# Patient Record
Sex: MALE | Race: WHITE | Employment: FULL TIME | URBAN - METROPOLITAN AREA
[De-identification: names, ages, dates, MRNs, and addresses within clinical notes are randomized per-mention and may not be internally consistent; named-entity substitution may affect disease eponyms.]

---

## 2018-05-28 RX ORDER — METFORMIN HYDROCHLORIDE 500 MG/1
TABLET ORAL
Qty: 60 TABLET | Refills: 0 | Status: SHIPPED | OUTPATIENT
Start: 2018-05-28 | End: 2019-07-15 | Stop reason: SDUPTHER

## 2018-05-28 RX ORDER — LOSARTAN POTASSIUM 50 MG/1
TABLET ORAL
Qty: 30 TABLET | Refills: 0 | Status: SHIPPED | OUTPATIENT
Start: 2018-05-28 | End: 2018-07-19 | Stop reason: SDUPTHER

## 2018-07-19 RX ORDER — LOSARTAN POTASSIUM 50 MG/1
TABLET ORAL
Qty: 30 TABLET | Refills: 0 | Status: SHIPPED | OUTPATIENT
Start: 2018-07-19 | End: 2019-07-15 | Stop reason: SDUPTHER

## 2019-07-15 ENCOUNTER — HOSPITAL ENCOUNTER (EMERGENCY)
Facility: HOSPITAL | Age: 54
Discharge: HOME | End: 2019-07-15
Attending: EMERGENCY MEDICINE
Payer: COMMERCIAL

## 2019-07-15 VITALS
BODY MASS INDEX: 36.53 KG/M2 | TEMPERATURE: 99 F | DIASTOLIC BLOOD PRESSURE: 176 MMHG | WEIGHT: 300 LBS | RESPIRATION RATE: 17 BRPM | HEART RATE: 81 BPM | OXYGEN SATURATION: 96 % | SYSTOLIC BLOOD PRESSURE: 203 MMHG | HEIGHT: 76 IN

## 2019-07-15 DIAGNOSIS — I10 ESSENTIAL HYPERTENSION: ICD-10-CM

## 2019-07-15 DIAGNOSIS — H53.8 BLURRY VISION, RIGHT EYE: Primary | ICD-10-CM

## 2019-07-15 PROCEDURE — 99283 EMERGENCY DEPT VISIT LOW MDM: CPT

## 2019-07-15 RX ORDER — LOSARTAN POTASSIUM 50 MG/1
50 TABLET ORAL DAILY
Qty: 30 TABLET | Refills: 0 | Status: SHIPPED | OUTPATIENT
Start: 2019-07-15 | End: 2019-08-14

## 2019-07-15 RX ORDER — LOSARTAN POTASSIUM 50 MG/1
50 TABLET ORAL
Qty: 30 TABLET | Refills: 0 | Status: SHIPPED | OUTPATIENT
Start: 2019-07-15 | End: 2019-08-12 | Stop reason: SDUPTHER

## 2019-07-15 RX ORDER — TETRACAINE HYDROCHLORIDE 5 MG/ML
1 SOLUTION OPHTHALMIC ONCE
Status: COMPLETED | OUTPATIENT
Start: 2019-07-15 | End: 2019-07-15

## 2019-07-15 RX ORDER — METFORMIN HYDROCHLORIDE 500 MG/1
500 TABLET ORAL 2 TIMES DAILY WITH MEALS
Qty: 60 TABLET | Refills: 0 | Status: SHIPPED | OUTPATIENT
Start: 2019-07-15 | End: 2019-08-19 | Stop reason: SDUPTHER

## 2019-07-15 NOTE — ED INITIAL ASSESSMENT (HPI)
Right eye visual problems.  Reports he has been out of his losartan for the past few weeks and has elevated BP

## 2019-07-16 NOTE — ED NOTES
Pt c/o blurry vision to his rt eye since 1200 today. Pt notes that he ran out of his losartan prescription x2wks. Pt states that he is found an extra losartan pill today and took one dose this am. Pt denies headache. Kennard is negative.  Pt also mention

## 2019-07-16 NOTE — ED PROVIDER NOTES
Patient Seen in: [facility] Emergency Department    History   Patient presents with:   Eye Visual Problem (opthalmic)    Stated Complaint: right eye visual problems     HPI    58-year-old male presents for complaint of blurry vision to his right ey Eye Chart Acuity: 20/50, Corrected  Left Eye Chart Acuity: 20/25, Corrected    Physical Exam   Constitutional: He is oriented to person, place, and time. He appears well-developed and well-nourished. HENT:   Head: Normocephalic and atraumatic.    Eyes: Pu neurologist once home. He is otherwise neurovascularly intact and a symptom medic in regards to his elevated blood pressure. Imaging:   No results found.       Clinical impression as well as any lab results and radiology findings were discussed with the

## 2019-08-02 ENCOUNTER — OFFICE VISIT (OUTPATIENT)
Dept: PRIMARY CARE | Facility: CLINIC | Age: 54
End: 2019-08-02
Payer: COMMERCIAL

## 2019-08-02 ENCOUNTER — TELEPHONE (OUTPATIENT)
Dept: PRIMARY CARE | Facility: CLINIC | Age: 54
End: 2019-08-02

## 2019-08-02 VITALS
TEMPERATURE: 97.9 F | RESPIRATION RATE: 16 BRPM | DIASTOLIC BLOOD PRESSURE: 90 MMHG | SYSTOLIC BLOOD PRESSURE: 150 MMHG | BODY MASS INDEX: 38.36 KG/M2 | WEIGHT: 315 LBS | HEIGHT: 76 IN | HEART RATE: 66 BPM | OXYGEN SATURATION: 98 %

## 2019-08-02 DIAGNOSIS — Z11.59 NEED FOR HEPATITIS C SCREENING TEST: ICD-10-CM

## 2019-08-02 DIAGNOSIS — I10 ESSENTIAL HYPERTENSION: ICD-10-CM

## 2019-08-02 DIAGNOSIS — Z00.00 ENCOUNTER FOR GENERAL ADULT MEDICAL EXAMINATION WITHOUT ABNORMAL FINDINGS: Primary | ICD-10-CM

## 2019-08-02 DIAGNOSIS — Z12.5 SCREENING PSA (PROSTATE SPECIFIC ANTIGEN): ICD-10-CM

## 2019-08-02 DIAGNOSIS — E78.5 ELEVATED LIPIDS: ICD-10-CM

## 2019-08-02 DIAGNOSIS — Z12.11 SCREENING FOR COLON CANCER: ICD-10-CM

## 2019-08-02 DIAGNOSIS — E11.8 TYPE 2 DIABETES MELLITUS WITH COMPLICATION, WITHOUT LONG-TERM CURRENT USE OF INSULIN (CMS/HCC): ICD-10-CM

## 2019-08-02 PROCEDURE — 99396 PREV VISIT EST AGE 40-64: CPT | Performed by: FAMILY MEDICINE

## 2019-08-02 ASSESSMENT — ENCOUNTER SYMPTOMS
POLYPHAGIA: 0
NUMBNESS: 0
DYSURIA: 0
HEADACHES: 0
DIARRHEA: 0
POLYDIPSIA: 0
WHEEZING: 0
BLOOD IN STOOL: 0
DIZZINESS: 0
COUGH: 0
FEVER: 0
HEMATURIA: 0
CHILLS: 0
CONSTIPATION: 0
WEAKNESS: 0
VOMITING: 0
SHORTNESS OF BREATH: 0
NAUSEA: 0
ABDOMINAL PAIN: 0

## 2019-08-02 NOTE — PROGRESS NOTES
"Subjective      Patient ID: Lake Martin is a 53 y.o. male.  1965      epp        The following have been reviewed and updated as appropriate in this visit:  Problems       Review of Systems   Constitutional: Negative for chills and fever.   HENT: Negative for dental problem and hearing loss.    Eyes: Positive for visual disturbance.   Respiratory: Negative for cough, shortness of breath and wheezing.    Cardiovascular: Negative for chest pain and leg swelling.   Gastrointestinal: Negative for abdominal pain, blood in stool, constipation, diarrhea, nausea and vomiting.   Endocrine: Negative for cold intolerance, heat intolerance, polydipsia, polyphagia and polyuria.   Genitourinary: Negative for discharge, dysuria and hematuria.   Musculoskeletal: Negative for gait problem.   Skin: Negative for rash.   Neurological: Negative for dizziness, weakness, numbness and headaches.   All other systems reviewed and are negative.      Objective     Vitals:    08/02/19 0912 08/02/19 0938   BP: (!) 182/110 (!) 150/90   BP Location: Right upper arm    Patient Position: Sitting    Pulse: 66    Resp: 16    Temp: 36.6 °C (97.9 °F)    TempSrc: Oral    SpO2: 98%    Weight: (!) 143 kg (316 lb)    Height: 1.93 m (6' 4\")      Body mass index is 38.46 kg/m².    Physical Exam   Constitutional: He is oriented to person, place, and time. He appears well-developed and well-nourished. No distress.   HENT:   Head: Normocephalic and atraumatic.   Right Ear: Tympanic membrane, external ear and ear canal normal.   Left Ear: Tympanic membrane, external ear and ear canal normal.   Nose: Nose normal.   Mouth/Throat: Oropharynx is clear and moist.   Eyes: Pupils are equal, round, and reactive to light. Conjunctivae and EOM are normal. No scleral icterus.   Neck: Normal range of motion. Neck supple. No JVD present. No tracheal deviation present. No thyromegaly present.   Cardiovascular: Normal rate, regular rhythm and normal heart sounds.  Exam " reveals no gallop and no friction rub.    No murmur heard.  Pulmonary/Chest: Effort normal and breath sounds normal.   Abdominal: Soft. Bowel sounds are normal. He exhibits no distension and no mass. There is no hepatosplenomegaly. There is no tenderness. There is no rebound and no guarding. No hernia.   Musculoskeletal: Normal range of motion. He exhibits no edema or deformity.   Lymphadenopathy:     He has no cervical adenopathy.   Neurological: He is alert and oriented to person, place, and time. He has normal strength.   gnf   Skin: Skin is warm and dry. No rash noted.   Psychiatric: He has a normal mood and affect. His behavior is normal.   Vitals reviewed.      Assessment/Plan   Diagnoses and all orders for this visit:    Encounter for general adult medical examination without abnormal findings (Primary)  Comments:  rec shingrix    Screening for colon cancer  -     Direct Access Colonoscopy [facility]; Future    Elevated lipids  -     Lipid panel; Future    Type 2 diabetes mellitus with complication, without long-term current use of insulin (CMS/HCC)  Assessment & Plan:  Diab Diet  Regular exercise  Weight loss  Sees Ophthal     Orders:  -     Comprehensive metabolic panel; Future  -     Hemoglobin A1c; Future  -     Microalbumin / creatinine urine ratio; Future  -     CBC; Future    Screening PSA (prostate specific antigen)  -     PSA; Future    Need for hepatitis C screening test  -     Hepatitis C antibody; Future    Essential hypertension  Assessment & Plan:  Home readings 130/90  Monitor  Wt loss

## 2019-08-02 NOTE — TELEPHONE ENCOUNTER
Pt was seen [facility] about 2 weeks ago and requested the records be faxed to Dr. Fishman  Pt would like confirmation that these records have been received.   Pt can be reached at [phone].

## 2019-08-19 RX ORDER — METFORMIN HYDROCHLORIDE 500 MG/1
500 TABLET ORAL 2 TIMES DAILY WITH MEALS
Qty: 180 TABLET | Refills: 3 | Status: SHIPPED | OUTPATIENT
Start: 2019-08-19 | End: 2020-01-02 | Stop reason: SDUPTHER

## 2019-08-19 RX ORDER — LOSARTAN POTASSIUM 50 MG/1
50 TABLET ORAL
Qty: 90 TABLET | Refills: 3 | Status: SHIPPED | OUTPATIENT
Start: 2019-08-19 | End: 2020-10-30

## 2019-08-19 RX ORDER — METFORMIN HYDROCHLORIDE 500 MG/1
TABLET ORAL
Qty: 60 TABLET | Refills: 3 | Status: SHIPPED | OUTPATIENT
Start: 2019-08-19 | End: 2019-08-19 | Stop reason: SDUPTHER

## 2019-12-30 ENCOUNTER — TELEPHONE (OUTPATIENT)
Dept: PRIMARY CARE | Facility: CLINIC | Age: 54
End: 2019-12-30

## 2019-12-31 LAB
ERYTHROCYTE [DISTWIDTH] IN BLOOD BY AUTOMATED COUNT: 13.5 % (ref 12.3–15.4)
HCT VFR BLD AUTO: 46.5 % (ref 37.5–51)
HCV AB S/CO SERPL IA: 0.1 S/CO RATIO (ref 0–0.9)
HGB BLD-MCNC: 15.8 G/DL (ref 13–17.7)
MCH RBC QN AUTO: 31 PG (ref 26.6–33)
MCHC RBC AUTO-ENTMCNC: 34 G/DL (ref 31.5–35.7)
MCV RBC AUTO: 91 FL (ref 79–97)
PLATELET # BLD AUTO: 256 X10E3/UL (ref 150–450)
RBC # BLD AUTO: 5.09 X10E6/UL (ref 4.14–5.8)
WBC # BLD AUTO: 9 X10E3/UL (ref 3.4–10.8)

## 2020-01-01 LAB
ALBUMIN SERPL-MCNC: 4.3 G/DL (ref 3.5–5.5)
ALBUMIN/CREAT UR: 59.9 MG/G CREAT (ref 0–30)
ALBUMIN/GLOB SERPL: 1.6 {RATIO} (ref 1.2–2.2)
ALP SERPL-CCNC: 92 IU/L (ref 39–117)
ALT SERPL-CCNC: 18 IU/L (ref 0–44)
AST SERPL-CCNC: 16 IU/L (ref 0–40)
BILIRUB SERPL-MCNC: 0.3 MG/DL (ref 0–1.2)
BUN SERPL-MCNC: 10 MG/DL (ref 6–24)
BUN/CREAT SERPL: 14 (ref 9–20)
CALCIUM SERPL-MCNC: 9.5 MG/DL (ref 8.7–10.2)
CHLORIDE SERPL-SCNC: 98 MMOL/L (ref 96–106)
CHOLEST SERPL-MCNC: 211 MG/DL (ref 100–199)
CO2 SERPL-SCNC: 25 MMOL/L (ref 20–29)
CREAT SERPL-MCNC: 0.73 MG/DL (ref 0.76–1.27)
CREAT UR-MCNC: 108.1 MG/DL
GLOBULIN SER CALC-MCNC: 2.7 G/DL (ref 1.5–4.5)
GLUCOSE SERPL-MCNC: 268 MG/DL (ref 65–99)
HBA1C MFR BLD: 9.4 % (ref 4.8–5.6)
HDLC SERPL-MCNC: 41 MG/DL
LAB CORP EGFR IF AFRICN AM: 121 ML/MIN/1.73
LAB CORP EGFR IF NONAFRICN AM: 104 ML/MIN/1.73
LDLC SERPL CALC-MCNC: 104 MG/DL (ref 0–99)
MICROALBUMIN UR-MCNC: 64.7 UG/ML
POTASSIUM SERPL-SCNC: 4.2 MMOL/L (ref 3.5–5.2)
PROT SERPL-MCNC: 7 G/DL (ref 6–8.5)
PSA SERPL-MCNC: 0.1 NG/ML (ref 0–4)
SODIUM SERPL-SCNC: 138 MMOL/L (ref 134–144)
TRIGL SERPL-MCNC: 329 MG/DL (ref 0–149)
VLDLC SERPL CALC-MCNC: 66 MG/DL (ref 5–40)

## 2020-01-02 ENCOUNTER — TELEPHONE (OUTPATIENT)
Dept: PRIMARY CARE | Facility: CLINIC | Age: 55
End: 2020-01-02

## 2020-01-02 RX ORDER — METFORMIN HYDROCHLORIDE 1000 MG/1
1000 TABLET ORAL 2 TIMES DAILY WITH MEALS
Qty: 180 TABLET | Refills: 1 | Status: SHIPPED | OUTPATIENT
Start: 2020-01-02 | End: 2020-10-30

## 2020-01-02 NOTE — TELEPHONE ENCOUNTER
Left detailed vm for pt with all information. Sending new med dose to pharmacy ----- Message from Leland Fishman MD sent at 1/2/2020  3:42 PM EST -----  a1c  too high  Watch diet; exercise  incr metformin to 1000 mg BID  F/u appt 3 months

## 2020-03-11 ENCOUNTER — TELEPHONE (OUTPATIENT)
Dept: PRIMARY CARE | Facility: CLINIC | Age: 55
End: 2020-03-11

## 2020-06-15 ENCOUNTER — TELEPHONE (OUTPATIENT)
Dept: PRIMARY CARE | Facility: CLINIC | Age: 55
End: 2020-06-15

## 2020-06-15 NOTE — TELEPHONE ENCOUNTER
Pt called and was wondering where he should go to get his shingles vaccination. Should he come to the office or does he have to go somewhere else. Please advise. Pt can be reached at [phone]

## 2020-06-22 ENCOUNTER — CLINICAL SUPPORT (OUTPATIENT)
Dept: PRIMARY CARE | Facility: CLINIC | Age: 55
End: 2020-06-22
Payer: COMMERCIAL

## 2020-06-22 DIAGNOSIS — Z23 IMMUNIZATION DUE: Primary | ICD-10-CM

## 2020-06-22 PROCEDURE — 90750 HZV VACC RECOMBINANT IM: CPT | Performed by: FAMILY MEDICINE

## 2020-06-22 PROCEDURE — 90471 IMMUNIZATION ADMIN: CPT | Performed by: FAMILY MEDICINE

## 2020-07-22 RX ORDER — METFORMIN HYDROCHLORIDE 1000 MG/1
TABLET ORAL
Qty: 180 TABLET | Refills: 1 | OUTPATIENT
Start: 2020-07-22

## 2020-07-22 NOTE — TELEPHONE ENCOUNTER
epv 8/2/19  Last A1c 12/2019  9.4    Pt states he has about 1.5 months of medication remaining, did not request refill  Reviewed pcp policy for DM f/u appts  States has lost weight & has a nurse friend that tests his A1c    Advised to contact the office at earliest convenience to schedule an appt as will be needed for refills

## 2020-08-28 ENCOUNTER — TELEPHONE (OUTPATIENT)
Dept: PRIMARY CARE | Facility: CLINIC | Age: 55
End: 2020-08-28

## 2020-10-28 ENCOUNTER — TELEPHONE (OUTPATIENT)
Dept: PRIMARY CARE | Facility: CLINIC | Age: 55
End: 2020-10-28

## 2020-10-30 RX ORDER — METFORMIN HYDROCHLORIDE 1000 MG/1
TABLET ORAL
Qty: 60 TABLET | Refills: 0 | Status: SHIPPED | OUTPATIENT
Start: 2020-10-30 | End: 2020-11-30

## 2020-10-30 RX ORDER — LOSARTAN POTASSIUM 50 MG/1
TABLET ORAL
Qty: 30 TABLET | Refills: 0 | Status: SHIPPED | OUTPATIENT
Start: 2020-10-30 | End: 2020-11-30

## 2020-11-16 ENCOUNTER — CLINICAL SUPPORT (OUTPATIENT)
Dept: PRIMARY CARE | Facility: CLINIC | Age: 55
End: 2020-11-16
Payer: COMMERCIAL

## 2020-11-16 PROCEDURE — 90471 IMMUNIZATION ADMIN: CPT | Performed by: FAMILY MEDICINE

## 2020-11-16 PROCEDURE — 90750 HZV VACC RECOMBINANT IM: CPT | Performed by: FAMILY MEDICINE

## 2020-12-28 RX ORDER — METFORMIN HYDROCHLORIDE 1000 MG/1
TABLET ORAL
Qty: 60 TABLET | Refills: 0 | Status: SHIPPED | OUTPATIENT
Start: 2020-12-28 | End: 2020-12-30 | Stop reason: SDUPTHER

## 2020-12-29 RX ORDER — METFORMIN HYDROCHLORIDE 1000 MG/1
1000 TABLET ORAL 2 TIMES DAILY WITH MEALS
Qty: 180 TABLET | Refills: 0 | Status: CANCELLED | OUTPATIENT
Start: 2020-12-29

## 2020-12-30 ENCOUNTER — TELEPHONE (OUTPATIENT)
Dept: PRIMARY CARE | Facility: CLINIC | Age: 55
End: 2020-12-30

## 2020-12-30 ENCOUNTER — OFFICE VISIT (OUTPATIENT)
Dept: PRIMARY CARE | Facility: CLINIC | Age: 55
End: 2020-12-30
Payer: COMMERCIAL

## 2020-12-30 VITALS
HEART RATE: 66 BPM | WEIGHT: 304 LBS | HEIGHT: 76 IN | SYSTOLIC BLOOD PRESSURE: 182 MMHG | DIASTOLIC BLOOD PRESSURE: 100 MMHG | OXYGEN SATURATION: 99 % | BODY MASS INDEX: 37.02 KG/M2

## 2020-12-30 DIAGNOSIS — Z00.00 ENCOUNTER FOR GENERAL ADULT MEDICAL EXAMINATION WITHOUT ABNORMAL FINDINGS: Primary | ICD-10-CM

## 2020-12-30 DIAGNOSIS — Z12.5 SCREENING PSA (PROSTATE SPECIFIC ANTIGEN): ICD-10-CM

## 2020-12-30 DIAGNOSIS — E78.5 ELEVATED LIPIDS: ICD-10-CM

## 2020-12-30 DIAGNOSIS — E11.69 TYPE 2 DIABETES MELLITUS WITH OTHER SPECIFIED COMPLICATION, WITHOUT LONG-TERM CURRENT USE OF INSULIN: ICD-10-CM

## 2020-12-30 DIAGNOSIS — I10 ESSENTIAL HYPERTENSION: ICD-10-CM

## 2020-12-30 PROCEDURE — 99396 PREV VISIT EST AGE 40-64: CPT | Performed by: FAMILY MEDICINE

## 2020-12-30 RX ORDER — LOSARTAN POTASSIUM AND HYDROCHLOROTHIAZIDE 12.5; 5 MG/1; MG/1
1 TABLET ORAL DAILY
Qty: 90 TABLET | Refills: 0 | Status: SHIPPED | OUTPATIENT
Start: 2020-12-30 | End: 2021-04-02 | Stop reason: SDUPTHER

## 2020-12-30 RX ORDER — METFORMIN HYDROCHLORIDE 1000 MG/1
1000 TABLET ORAL 2 TIMES DAILY WITH MEALS
Qty: 180 TABLET | Refills: 1 | OUTPATIENT
Start: 2020-12-30 | End: 2021-03-30

## 2020-12-30 RX ORDER — METFORMIN HYDROCHLORIDE 1000 MG/1
1000 TABLET ORAL 2 TIMES DAILY WITH MEALS
Qty: 180 TABLET | Refills: 0 | Status: SHIPPED | OUTPATIENT
Start: 2020-12-30 | End: 2021-05-10

## 2020-12-30 RX ORDER — LOSARTAN POTASSIUM 50 MG/1
50 TABLET ORAL
Qty: 90 TABLET | Refills: 0 | OUTPATIENT
Start: 2020-12-30

## 2020-12-30 ASSESSMENT — ENCOUNTER SYMPTOMS
NUMBNESS: 0
WEAKNESS: 0
BLOOD IN STOOL: 0
WHEEZING: 0
CONSTIPATION: 0
POLYPHAGIA: 0
POLYDIPSIA: 0
HEADACHES: 0
HEMATURIA: 0
CHILLS: 0
NAUSEA: 0
DYSURIA: 0
FEVER: 0
DIARRHEA: 0
VOMITING: 0
SHORTNESS OF BREATH: 0
COUGH: 0
DIZZINESS: 0
DYSPHORIC MOOD: 0
ABDOMINAL PAIN: 0

## 2020-12-30 NOTE — PROGRESS NOTES
"      Subjective      Patient ID: Lake Martin is a 55 y.o. male.  1965      epp      The following have been reviewed and updated as appropriate in this visit:  Problems       Review of Systems   Constitutional: Negative for chills and fever.   HENT: Negative for dental problem and hearing loss.    Eyes: Negative for visual disturbance.   Respiratory: Negative for cough, shortness of breath and wheezing.    Cardiovascular: Negative for chest pain and leg swelling.   Gastrointestinal: Negative for abdominal pain, blood in stool, constipation, diarrhea, nausea and vomiting.   Endocrine: Negative for cold intolerance, heat intolerance, polydipsia, polyphagia and polyuria.   Genitourinary: Negative for discharge, dysuria and hematuria.   Musculoskeletal: Negative for gait problem.   Skin: Negative for rash.   Neurological: Negative for dizziness, syncope, weakness, numbness and headaches.   Psychiatric/Behavioral: Negative for dysphoric mood.   All other systems reviewed and are negative.      Objective     Vitals:    12/30/20 1116   BP: (!) 182/100   BP Location: Right upper arm   Patient Position: Sitting   Pulse: 66   SpO2: 99%   Weight: (!) 138 kg (304 lb)   Height: 1.93 m (6' 4\")     Body mass index is 37 kg/m².    Physical Exam  Vitals signs and nursing note reviewed.   Constitutional:       Appearance: Normal appearance. He is well-developed.   HENT:      Head: Normocephalic and atraumatic.      Right Ear: Tympanic membrane, ear canal and external ear normal.      Left Ear: Tympanic membrane, ear canal and external ear normal.      Nose: Nose normal.      Mouth/Throat:      Mouth: Mucous membranes are moist.      Pharynx: Oropharynx is clear.   Eyes:      General: No scleral icterus.     Conjunctiva/sclera: Conjunctivae normal.      Pupils: Pupils are equal, round, and reactive to light.   Neck:      Musculoskeletal: Normal range of motion and neck supple.      Thyroid: No thyromegaly.      Vascular: No " JVD.      Trachea: No tracheal deviation.   Cardiovascular:      Rate and Rhythm: Normal rate and regular rhythm.      Heart sounds: Normal heart sounds. No murmur. No friction rub. No gallop.    Pulmonary:      Effort: Pulmonary effort is normal.      Breath sounds: Normal breath sounds.   Abdominal:      General: Bowel sounds are normal. There is no distension.      Palpations: Abdomen is soft. There is no mass.      Tenderness: There is no abdominal tenderness. There is no guarding or rebound.      Hernia: No hernia is present.   Musculoskeletal: Normal range of motion.         General: No deformity.      Right lower leg: No edema.      Left lower leg: No edema.   Lymphadenopathy:      Cervical: No cervical adenopathy.   Skin:     General: Skin is warm and dry.      Findings: No rash.   Neurological:      General: No focal deficit present.      Mental Status: He is alert and oriented to person, place, and time.   Psychiatric:         Mood and Affect: Mood normal.         Behavior: Behavior normal.         Assessment/Plan   Diagnoses and all orders for this visit:    Encounter for general adult medical examination without abnormal findings (Primary)  Comments:  labs  colonosc    Screening PSA (prostate specific antigen)  -     PSA; Future    Type 2 diabetes mellitus with other specified complication, without long-term current use of insulin (CMS/[facility])  -     CBC and differential; Future  -     Comprehensive metabolic panel; Future  -     Hemoglobin A1c; Future  -     Lipid panel; Future  -     Microalbumin / creatinine urine ratio; Future    Elevated lipids  -     CBC and differential; Future  -     Comprehensive metabolic panel; Future  -     Lipid panel; Future    Essential hypertension  Comments:  change to hyzaar  f/u 1 month    Other orders  -     losartan-hydrochlorothiazide (HYZAAR) 50-12.5 mg per tablet; Take 1 tablet by mouth daily.

## 2020-12-30 NOTE — TELEPHONE ENCOUNTER
Dr. Laura Ferrell can you please add an referral and order for Colpnoscopy for [place]. Thank you.

## 2021-03-01 ENCOUNTER — TELEPHONE (OUTPATIENT)
Dept: PRIMARY CARE | Facility: CLINIC | Age: 56
End: 2021-03-01

## 2021-03-01 NOTE — TELEPHONE ENCOUNTER
On Thursday patient noticed a pea sized bump on his L cheek area, the size has since increased, today it is about the size of a nickel. It is not red and he has no discomfort. He learned that there is a gland there and believes that's what it is. He has recently been having issues with fluid in ears and ears feeling clogged but pop eventually and wants to know if maybe its related to that. He wants to know if he should be seen or do a virtual appt. He is not available until Friday if so, and would like recommendation on anything to do until then. Has been putting warm compresses on it.    [phone]

## 2021-03-05 ENCOUNTER — OFFICE VISIT (OUTPATIENT)
Dept: PRIMARY CARE | Facility: CLINIC | Age: 56
End: 2021-03-05
Payer: COMMERCIAL

## 2021-03-05 ENCOUNTER — TELEPHONE (OUTPATIENT)
Dept: PRIMARY CARE | Facility: CLINIC | Age: 56
End: 2021-03-05

## 2021-03-05 VITALS
HEIGHT: 76 IN | DIASTOLIC BLOOD PRESSURE: 70 MMHG | HEART RATE: 70 BPM | OXYGEN SATURATION: 97 % | BODY MASS INDEX: 37.99 KG/M2 | RESPIRATION RATE: 16 BRPM | TEMPERATURE: 97.7 F | SYSTOLIC BLOOD PRESSURE: 124 MMHG | WEIGHT: 312 LBS

## 2021-03-05 DIAGNOSIS — K11.8 PAROTID MASS: Primary | ICD-10-CM

## 2021-03-05 DIAGNOSIS — Z12.11 COLON CANCER SCREENING: Primary | ICD-10-CM

## 2021-03-05 DIAGNOSIS — E11.69 TYPE 2 DIABETES MELLITUS WITH OTHER SPECIFIED COMPLICATION, WITHOUT LONG-TERM CURRENT USE OF INSULIN: ICD-10-CM

## 2021-03-05 PROCEDURE — 99213 OFFICE O/P EST LOW 20 MIN: CPT | Performed by: FAMILY MEDICINE

## 2021-03-05 RX ORDER — AZITHROMYCIN 250 MG/1
TABLET, FILM COATED ORAL
Qty: 6 TABLET | Refills: 0 | Status: SHIPPED | OUTPATIENT
Start: 2021-03-05 | End: 2021-03-10

## 2021-03-05 ASSESSMENT — ENCOUNTER SYMPTOMS
RHINORRHEA: 0
UNEXPECTED WEIGHT CHANGE: 0
FACIAL SWELLING: 1
APPETITE CHANGE: 0
STRIDOR: 0
FEVER: 0
CHILLS: 0
ABDOMINAL PAIN: 0
COUGH: 0
SINUS PAIN: 0
SORE THROAT: 0
WHEEZING: 0
SHORTNESS OF BREATH: 0
VOMITING: 0
COLOR CHANGE: 0
DIAPHORESIS: 0
FATIGUE: 0
EYE DISCHARGE: 0
HEADACHES: 0
DIARRHEA: 0

## 2021-03-05 ASSESSMENT — PATIENT HEALTH QUESTIONNAIRE - PHQ9: SUM OF ALL RESPONSES TO PHQ9 QUESTIONS 1 & 2: 0

## 2021-03-05 NOTE — TELEPHONE ENCOUNTER
Pt needs written Direct Access referral to [facility] for his colonoscopy.   Pt was given their number to call to schedule but a referral needs to be put in the system

## 2021-03-05 NOTE — PROGRESS NOTES
"      Subjective      Patient ID: Lake Martin is a 55 y.o. male.  1965      L parotid lump x 1 wk  Smaller now  Not painful  Was mildly tender      The following have been reviewed and updated as appropriate in this visit:       Review of Systems   Constitutional: Negative for appetite change, chills, diaphoresis, fatigue, fever and unexpected weight change.   HENT: Positive for facial swelling. Negative for congestion, dental problem, ear discharge, ear pain, hearing loss, mouth sores, postnasal drip, rhinorrhea, sinus pain, sore throat and tinnitus.    Eyes: Negative for discharge.   Respiratory: Negative for cough, shortness of breath, wheezing and stridor.    Cardiovascular: Negative for chest pain and leg swelling.   Gastrointestinal: Negative for abdominal pain, diarrhea and vomiting.   Skin: Negative for color change and rash.   Neurological: Negative for headaches.       Objective     Vitals:    03/05/21 1157   BP: 124/70   BP Location: Left upper arm   Patient Position: Sitting   Pulse: 70   Resp: 16   Temp: 36.5 °C (97.7 °F)   TempSrc: Tympanic   SpO2: 97%   Weight: (!) 142 kg (312 lb)   Height: 1.93 m (6' 4\")     Body mass index is 37.98 kg/m².    Physical Exam  Vitals signs and nursing note reviewed.   Constitutional:       Appearance: Normal appearance.   HENT:      Head: Normocephalic and atraumatic.      Left Ear: Tympanic membrane, ear canal and external ear normal.      Ears:      Comments: Firm nontender nonpulsatile mobile L parotid mass     Nose: Nose normal.      Mouth/Throat:      Mouth: Mucous membranes are moist.      Pharynx: Oropharynx is clear.   Eyes:      General: No scleral icterus.  Neck:      Musculoskeletal: Neck supple.   Musculoskeletal:      Right lower leg: No edema.      Left lower leg: No edema.   Lymphadenopathy:      Cervical: No cervical adenopathy.   Skin:     General: Skin is warm and dry.      Findings: No erythema or rash.   Neurological:      General: No focal " deficit present.      Mental Status: He is alert.   Psychiatric:         Mood and Affect: Mood normal.         Behavior: Behavior normal.         Assessment/Plan   Diagnoses and all orders for this visit:    Parotid mass (Primary)  Comments:  improved  ?sialoadenitis  warm compresses  zpak  to ENT if persists    Type 2 diabetes mellitus with other specified complication, without long-term current use of insulin (CMS/[facility])    Other orders  -     azithromycin (ZITHROMAX) 250 mg tablet; Take 2 tablets the first day, then 1 tablet daily for 4 days.

## 2021-03-06 LAB
ALBUMIN SERPL-MCNC: 4.3 G/DL (ref 3.8–4.9)
ALBUMIN/CREAT UR: 59 MG/G CREAT (ref 0–29)
ALBUMIN/GLOB SERPL: 1.5 {RATIO} (ref 1.2–2.2)
ALP SERPL-CCNC: 72 IU/L (ref 39–117)
ALT SERPL-CCNC: 14 IU/L (ref 0–44)
AST SERPL-CCNC: 19 IU/L (ref 0–40)
BASOPHILS # BLD AUTO: 0.1 X10E3/UL (ref 0–0.2)
BASOPHILS NFR BLD AUTO: 1 %
BILIRUB SERPL-MCNC: 1 MG/DL (ref 0–1.2)
BUN SERPL-MCNC: 13 MG/DL (ref 6–24)
BUN/CREAT SERPL: 17 (ref 9–20)
CALCIUM SERPL-MCNC: 10 MG/DL (ref 8.7–10.2)
CHLORIDE SERPL-SCNC: 102 MMOL/L (ref 96–106)
CHOLEST SERPL-MCNC: 185 MG/DL (ref 100–199)
CO2 SERPL-SCNC: 27 MMOL/L (ref 20–29)
CREAT SERPL-MCNC: 0.77 MG/DL (ref 0.76–1.27)
CREAT UR-MCNC: 141.7 MG/DL
EOSINOPHIL # BLD AUTO: 0.2 X10E3/UL (ref 0–0.4)
EOSINOPHIL NFR BLD AUTO: 2 %
ERYTHROCYTE [DISTWIDTH] IN BLOOD BY AUTOMATED COUNT: 13 % (ref 11.6–15.4)
GLOBULIN SER CALC-MCNC: 2.9 G/DL (ref 1.5–4.5)
GLUCOSE SERPL-MCNC: 133 MG/DL (ref 65–99)
HBA1C MFR BLD: 6.7 % (ref 4.8–5.6)
HCT VFR BLD AUTO: 45.3 % (ref 37.5–51)
HDLC SERPL-MCNC: 48 MG/DL
HGB BLD-MCNC: 15.2 G/DL (ref 13–17.7)
IMM GRANULOCYTES # BLD AUTO: 0 X10E3/UL (ref 0–0.1)
IMM GRANULOCYTES NFR BLD AUTO: 0 %
LAB CORP EGFR IF AFRICN AM: 117 ML/MIN/1.73
LAB CORP EGFR IF NONAFRICN AM: 101 ML/MIN/1.73
LDLC SERPL CALC-MCNC: 109 MG/DL (ref 0–99)
LYMPHOCYTES # BLD AUTO: 1.7 X10E3/UL (ref 0.7–3.1)
LYMPHOCYTES NFR BLD AUTO: 23 %
MCH RBC QN AUTO: 31.2 PG (ref 26.6–33)
MCHC RBC AUTO-ENTMCNC: 33.6 G/DL (ref 31.5–35.7)
MCV RBC AUTO: 93 FL (ref 79–97)
MICROALBUMIN UR-MCNC: 83 UG/ML
MONOCYTES # BLD AUTO: 0.4 X10E3/UL (ref 0.1–0.9)
MONOCYTES NFR BLD AUTO: 6 %
NEUTROPHILS # BLD AUTO: 5.2 X10E3/UL (ref 1.4–7)
NEUTROPHILS NFR BLD AUTO: 68 %
PLATELET # BLD AUTO: 210 X10E3/UL (ref 150–450)
POTASSIUM SERPL-SCNC: 3.9 MMOL/L (ref 3.5–5.2)
PROT SERPL-MCNC: 7.2 G/DL (ref 6–8.5)
PSA SERPL-MCNC: 0.1 NG/ML (ref 0–4)
RBC # BLD AUTO: 4.87 X10E6/UL (ref 4.14–5.8)
SODIUM SERPL-SCNC: 140 MMOL/L (ref 134–144)
TRIGL SERPL-MCNC: 161 MG/DL (ref 0–149)
VLDLC SERPL CALC-MCNC: 28 MG/DL (ref 5–40)
WBC # BLD AUTO: 7.5 X10E3/UL (ref 3.4–10.8)

## 2021-04-02 RX ORDER — LOSARTAN POTASSIUM AND HYDROCHLOROTHIAZIDE 12.5; 5 MG/1; MG/1
1 TABLET ORAL DAILY
Qty: 90 TABLET | Refills: 1 | Status: SHIPPED | OUTPATIENT
Start: 2021-04-02 | End: 2021-08-27

## 2021-05-10 RX ORDER — METFORMIN HYDROCHLORIDE 1000 MG/1
TABLET ORAL
Qty: 180 TABLET | Refills: 0 | Status: SHIPPED | OUTPATIENT
Start: 2021-05-10 | End: 2021-08-23

## 2021-05-10 NOTE — TELEPHONE ENCOUNTER
Medicine Refill Request    Last Office Visit: 3/5/2021  Last Telemedicine Visit: Visit date not found    Next Office Visit: Visit date not found  Next Telemedicine Visit: Visit date not found         Current Outpatient Medications:   •  losartan-hydrochlorothiazide (HYZAAR) 50-12.5 mg per tablet, Take 1 tablet by mouth daily., Disp: 90 tablet, Rfl: 1  •  metFORMIN (GLUCOPHAGE) 1,000 mg tablet, Take 1 tablet (1,000 mg total) by mouth 2 (two) times a day with meals., Disp: 180 tablet, Rfl: 0      BP Readings from Last 3 Encounters:   03/05/21 124/70   12/30/20 (!) 182/100   08/02/19 (!) 150/90       Recent Lab results:  Lab Results   Component Value Date    CHOL 185 03/05/2021   ,   Lab Results   Component Value Date    HDL 48 03/05/2021   ,   Lab Results   Component Value Date    LDLCALC 109 (H) 03/05/2021   ,   Lab Results   Component Value Date    TRIG 161 (H) 03/05/2021        Lab Results   Component Value Date    GLUCOSE 133 (H) 03/05/2021   ,   Lab Results   Component Value Date    HGBA1C 6.7 (H) 03/05/2021         Lab Results   Component Value Date    CREATININE 0.77 03/05/2021       Lab Results   Component Value Date    TSH 3.52 12/15/2015

## 2021-08-23 RX ORDER — METFORMIN HYDROCHLORIDE 1000 MG/1
TABLET ORAL
Qty: 180 TABLET | Refills: 0 | Status: SHIPPED | OUTPATIENT
Start: 2021-08-23 | End: 2021-11-24

## 2021-08-23 NOTE — TELEPHONE ENCOUNTER
Medicine Refill Request    Last Office Visit: 3/5/2021  Last Telemedicine Visit: Visit date not found    Next Office Visit: Visit date not found  Next Telemedicine Visit: Visit date not found         Current Outpatient Medications:   •  metFORMIN (GLUCOPHAGE) 1,000 mg tablet, TAKE 1 TABLET BY MOUTH TWICE A DAY WITH MEALS, Disp: 180 tablet, Rfl: 0  •  losartan-hydrochlorothiazide (HYZAAR) 50-12.5 mg per tablet, Take 1 tablet by mouth daily., Disp: 90 tablet, Rfl: 1      BP Readings from Last 3 Encounters:   03/05/21 124/70   12/30/20 (!) 182/100   08/02/19 (!) 150/90       Recent Lab results:  Lab Results   Component Value Date    CHOL 185 03/05/2021   ,   Lab Results   Component Value Date    HDL 48 03/05/2021   ,   Lab Results   Component Value Date    LDLCALC 109 (H) 03/05/2021   ,   Lab Results   Component Value Date    TRIG 161 (H) 03/05/2021        Lab Results   Component Value Date    GLUCOSE 133 (H) 03/05/2021   ,   Lab Results   Component Value Date    HGBA1C 6.7 (H) 03/05/2021         Lab Results   Component Value Date    CREATININE 0.77 03/05/2021       Lab Results   Component Value Date    TSH 3.52 12/15/2015

## 2021-08-27 RX ORDER — LOSARTAN POTASSIUM AND HYDROCHLOROTHIAZIDE 12.5; 5 MG/1; MG/1
TABLET ORAL
Qty: 90 TABLET | Refills: 1 | Status: SHIPPED | OUTPATIENT
Start: 2021-08-27 | End: 2022-02-11

## 2021-12-06 ENCOUNTER — TRANSCRIBE ORDERS (OUTPATIENT)
Dept: SCHEDULING | Age: 56
End: 2021-12-06
Payer: COMMERCIAL

## 2021-12-06 DIAGNOSIS — R19.8 OTHER SPECIFIED SYMPTOMS AND SIGNS INVOLVING THE DIGESTIVE SYSTEM AND ABDOMEN: Primary | ICD-10-CM

## 2021-12-07 ENCOUNTER — APPOINTMENT (OUTPATIENT)
Dept: LAB | Facility: HOSPITAL | Age: 56
End: 2021-12-07
Attending: INTERNAL MEDICINE
Payer: COMMERCIAL

## 2021-12-07 ENCOUNTER — TRANSCRIBE ORDERS (OUTPATIENT)
Dept: SCHEDULING | Age: 56
End: 2021-12-07
Payer: COMMERCIAL

## 2021-12-07 DIAGNOSIS — R19.8 OTHER SPECIFIED SYMPTOMS AND SIGNS INVOLVING THE DIGESTIVE SYSTEM AND ABDOMEN: ICD-10-CM

## 2021-12-07 DIAGNOSIS — R19.8 OTHER SPECIFIED SYMPTOMS AND SIGNS INVOLVING THE DIGESTIVE SYSTEM AND ABDOMEN: Primary | ICD-10-CM

## 2021-12-07 LAB
ALBUMIN SERPL-MCNC: 3.7 G/DL (ref 3.4–5)
ALP SERPL-CCNC: 64 IU/L (ref 35–126)
ALT SERPL-CCNC: 20 IU/L (ref 16–63)
ANION GAP SERPL CALC-SCNC: 11 MEQ/L (ref 3–15)
AST SERPL-CCNC: 19 IU/L (ref 15–41)
BILIRUB SERPL-MCNC: 1 MG/DL (ref 0.3–1.2)
BUN SERPL-MCNC: 5 MG/DL (ref 8–20)
CALCIUM SERPL-MCNC: 9.8 MG/DL (ref 8.9–10.3)
CEA SERPL-MCNC: 1.6 NG/ML
CHLORIDE SERPL-SCNC: 101 MEQ/L (ref 98–109)
CO2 SERPL-SCNC: 26 MEQ/L (ref 22–32)
CREAT SERPL-MCNC: 0.8 MG/DL (ref 0.8–1.3)
ERYTHROCYTE [DISTWIDTH] IN BLOOD BY AUTOMATED COUNT: 12.3 % (ref 11.6–14.4)
GFR SERPL CREATININE-BSD FRML MDRD: >60 ML/MIN/1.73M*2
GLUCOSE SERPL-MCNC: 155 MG/DL (ref 70–99)
HCT VFR BLDCO AUTO: 45.1 % (ref 40.1–51)
HGB BLD-MCNC: 15.2 G/DL (ref 13.7–17.5)
MCH RBC QN AUTO: 30.7 PG (ref 28–33.2)
MCHC RBC AUTO-ENTMCNC: 33.7 G/DL (ref 32.2–36.5)
MCV RBC AUTO: 91.1 FL (ref 83–98)
PDW BLD AUTO: 11.2 FL (ref 9.4–12.4)
PLATELET # BLD AUTO: 223 K/UL (ref 150–350)
POTASSIUM SERPL-SCNC: 4 MEQ/L (ref 3.6–5.1)
PROT SERPL-MCNC: 6.4 G/DL (ref 6–8.2)
RBC # BLD AUTO: 4.95 M/UL (ref 4.5–5.8)
SODIUM SERPL-SCNC: 138 MEQ/L (ref 136–144)
WBC # BLD AUTO: 8.75 K/UL (ref 3.8–10.5)

## 2021-12-07 PROCEDURE — 85027 COMPLETE CBC AUTOMATED: CPT

## 2021-12-07 PROCEDURE — 82378 CARCINOEMBRYONIC ANTIGEN: CPT

## 2021-12-07 PROCEDURE — 80053 COMPREHEN METABOLIC PANEL: CPT

## 2021-12-07 PROCEDURE — 36415 COLL VENOUS BLD VENIPUNCTURE: CPT

## 2021-12-10 ENCOUNTER — OFFICE VISIT (OUTPATIENT)
Dept: SURGERY | Facility: CLINIC | Age: 56
End: 2021-12-10
Payer: COMMERCIAL

## 2021-12-10 ENCOUNTER — HOSPITAL ENCOUNTER (OUTPATIENT)
Dept: RADIOLOGY | Facility: HOSPITAL | Age: 56
Discharge: HOME | End: 2021-12-10
Attending: INTERNAL MEDICINE
Payer: COMMERCIAL

## 2021-12-10 VITALS — HEIGHT: 76 IN | WEIGHT: 315 LBS | BODY MASS INDEX: 38.36 KG/M2

## 2021-12-10 DIAGNOSIS — R19.8 OTHER SPECIFIED SYMPTOMS AND SIGNS INVOLVING THE DIGESTIVE SYSTEM AND ABDOMEN: ICD-10-CM

## 2021-12-10 DIAGNOSIS — K62.89 RECTAL MASS: Primary | ICD-10-CM

## 2021-12-10 PROCEDURE — 3008F BODY MASS INDEX DOCD: CPT | Performed by: SURGERY

## 2021-12-10 PROCEDURE — 63600105 HC IODINE BASED CONTRAST: Performed by: INTERNAL MEDICINE

## 2021-12-10 PROCEDURE — 99204 OFFICE O/P NEW MOD 45 MIN: CPT | Performed by: SURGERY

## 2021-12-10 PROCEDURE — 71260 CT THORAX DX C+: CPT

## 2021-12-10 PROCEDURE — 25500000 HC DRUGS/INCIDENT RAD: Performed by: INTERNAL MEDICINE

## 2021-12-10 RX ORDER — METRONIDAZOLE 500 MG/1
500 TABLET ORAL 3 TIMES DAILY
Qty: 3 TABLET | Refills: 0 | Status: SHIPPED | OUTPATIENT
Start: 2021-12-10 | End: 2021-12-11

## 2021-12-10 RX ORDER — GABAPENTIN 300 MG/1
300 CAPSULE ORAL ONCE
Status: CANCELLED | OUTPATIENT
Start: 2021-12-30 | End: 2021-12-30

## 2021-12-10 RX ORDER — METRONIDAZOLE 500 MG/100ML
500 INJECTION, SOLUTION INTRAVENOUS ONCE
Status: CANCELLED | OUTPATIENT
Start: 2021-12-30 | End: 2021-12-30

## 2021-12-10 RX ORDER — NEOMYCIN SULFATE 500 MG/1
500 TABLET ORAL 3 TIMES DAILY
Qty: 3 TABLET | Refills: 0 | Status: SHIPPED | OUTPATIENT
Start: 2021-12-10 | End: 2021-12-11

## 2021-12-10 RX ORDER — CELECOXIB 200 MG/1
200 CAPSULE ORAL ONCE
Status: CANCELLED | OUTPATIENT
Start: 2021-12-30 | End: 2021-12-30

## 2021-12-10 RX ORDER — ACETAMINOPHEN 325 MG/1
975 TABLET ORAL ONCE
Status: CANCELLED | OUTPATIENT
Start: 2021-12-30 | End: 2021-12-30

## 2021-12-10 RX ADMIN — BARIUM SULFATE 900 ML: 21 SUSPENSION ORAL at 11:43

## 2021-12-10 RX ADMIN — IOHEXOL 125 ML: 350 INJECTION, SOLUTION INTRAVENOUS at 11:43

## 2021-12-10 NOTE — LETTER
2021     Fernando Pulliam MD  [address]  [address]  [place] [address]    Patient: Lake Martin  YOB: 1965  Date of Visit: 12/10/2021      Dear Dr. Pulliam:    Thank you for referring Lake Martin to me for evaluation. Below are my notes for this consultation.    If you have questions, please do not hesitate to call me. I look forward to following your patient along with you.         Sincerely,        Tiarra Martinez MD        CC: No Recipients  Tiarra Martinez MD  2021  1:33 PM  Sign when Signing Visit  Patient ID: Lake Martin                              : 1965  MRN: [id number]                                            Visit Date: 12/10/2021  Encounter Provider: Tiarra Martinez  Referring Provider: Fernando Pulliam MD    Subjective   Chief Complaint: Consult (Colon mass)      Lake Martin is a 56 y.o. old male presenting today for a newly found rectal mass on a colonoscopy.  The colonoscopy was being performed as a screening colonoscopy recommended to the patient on his annual physical.  This was his first colonoscopy.  He was having no GI symptoms.  No rectal bleeding.  No recent weight loss.  No altered bowel habits.  No family history of colorectal neoplasia.  He normally has 2 bowel movements per day.  He does not smoke or drink.  No previous abdominal surgeries.  He denies any allergies.  He takes no blood thinners.  No history of heart or lung disease.  His maternal grandfather had lung cancer.  Review of systems otherwise negative.  On colonoscopy he was noted to have a larger rectal mass 10 cm from the anal verge.  Biopsies of this revealed tubular adenoma with minute focus of high-grade dysplasia.  He did receive a CAT scan today of his chest abdomen pelvis for metastatic work-up but the results are not back in yet.  CEA level checked was 1.6..      Medications:     Current Outpatient Medications:   •   "losartan-hydrochlorothiazide (HYZAAR) 50-12.5 mg per tablet, TAKE 1 TABLET BY MOUTH EVERY DAY, Disp: 90 tablet, Rfl: 1  •  metFORMIN 1,000 mg tablet, TAKE 1 TABLET BY MOUTH TWICE A DAY WITH MEALS, Disp: 60 tablet, Rfl: 0    Past Medical History:  has a past medical history of Diabetes (CMS/HCC) and Hypertension.  Past Surgical History:  has a past surgical history that includes Colonoscopy (2021).  Social History:  reports that he has never smoked. He has never used smokeless tobacco. He reports previous alcohol use. No history on file for drug use.   Family History: family history includes Diabetes in his biological father; Heart failure in his biological father; Lung cancer in his maternal grandfather; No Known Problems in his biological sister; Other in his biological mother; Stroke in his biological father.   Allergies: has No Known Allergies.     Review of Systems  The following have been reviewed and updated as appropriate in this visit:          Objective   Vitals:   Visit Vitals  Ht 1.93 m (6' 4\")   Wt (!) 144 kg (318 lb)   BMI 38.71 kg/m²     Physical Exam  Vitals reviewed.   Constitutional:       Appearance: Normal appearance.   HENT:      Head: Normocephalic and atraumatic.      Mouth/Throat:      Pharynx: Oropharynx is clear.   Eyes:      Conjunctiva/sclera: Conjunctivae normal.   Cardiovascular:      Rate and Rhythm: Normal rate.   Pulmonary:      Effort: Pulmonary effort is normal.   Abdominal:      Comments: Soft, nontender, nondistended.  High BMI.  No guarding or rebound.  No peritoneal signs.   Musculoskeletal:      Cervical back: Neck supple.   Skin:     General: Skin is warm and dry.   Neurological:      General: No focal deficit present.      Mental Status: He is alert and oriented to person, place, and time.   Psychiatric:         Mood and Affect: Mood normal.         Behavior: Behavior normal.              Assessment/Plan   Problem List Items Addressed This Visit        Digestive    Rectal " mass - Primary    Relevant Orders    MRI PELVIS WITH AND WITHOUT CONTRAST    Case request operating room: LOW ANTERIOR RESECTION LAPAROSCOPIC ROBOTIC (Completed)    Basic metabolic panel    CBC and differential    Protime-INR    APTT    Type and screen    SARS-CoV-2 (COVID-19), PCR         and I discussed findings of his recent colonoscopy which showed a rectal mass 10 cm from the anal verge.  He did have some metastatic work-up done including a CEA level that is normal and also a CAT scan of his chest abdomen pelvis that confirms asymmetric thickening of the left aspect of the rectum for which neoplasm could not be excluded.  A few pulmonary nodules measuring up to 7 mm were noted which should be followed up.  I drew him and his spouse pictures for better understanding.  We also briefly discussed various stages of colorectal cancer and there appropriate treatment guidelines.  I would like to obtain a MRI of the pelvis with rectal cancer protocol to assess the relationship of this mass to surrounding structures in his treatment planning.  We also discussed a robotic low anterior resection, possible laparoscopic, possible open, possible ostomy in detail including risk, benefits, complications and alternatives.  I did inform him that I would have urology place lighted stents to help in my portion of the surgery.  I will perform a colonoscopy the day prior to petr this and tattoo this area for surgery.  He showed good understanding of our discussion and asked questions which I answered to satisfaction.  They know to call with any other questions queries or concerns.       Tiarra Martinez MD

## 2021-12-14 ENCOUNTER — TELEPHONE (OUTPATIENT)
Dept: PRIMARY CARE | Facility: CLINIC | Age: 56
End: 2021-12-14
Payer: COMMERCIAL

## 2021-12-14 DIAGNOSIS — K62.89 RECTAL MASS: Primary | ICD-10-CM

## 2021-12-14 NOTE — TELEPHONE ENCOUNTER
Needs pre-opt appt for colon resection.Appt needs to be prior to appt with surgeon, which is 12/22/2021. Surgery is on 12/30/2021. Pt has pre-opt appt with surgeon on 12/22/2021 at [facility] at

## 2021-12-14 NOTE — PROGRESS NOTES
Patient ID: Lake Martin                              : 1965  MRN: [id number]                                            Visit Date: 12/10/2021  Encounter Provider: Tiarra Martinez  Referring Provider: Fernando Pulliam MD    Subjective   Chief Complaint: Consult (Colon mass)      Lake Martin is a 56 y.o. old male presenting today for a newly found rectal mass on a colonoscopy.  The colonoscopy was being performed as a screening colonoscopy recommended to the patient on his annual physical.  This was his first colonoscopy.  He was having no GI symptoms.  No rectal bleeding.  No recent weight loss.  No altered bowel habits.  No family history of colorectal neoplasia.  He normally has 2 bowel movements per day.  He does not smoke or drink.  No previous abdominal surgeries.  He denies any allergies.  He takes no blood thinners.  No history of heart or lung disease.  His maternal grandfather had lung cancer.  Review of systems otherwise negative.  On colonoscopy he was noted to have a larger rectal mass 10 cm from the anal verge.  Biopsies of this revealed tubular adenoma with minute focus of high-grade dysplasia.  He did receive a CAT scan today of his chest abdomen pelvis for metastatic work-up but the results are not back in yet.  CEA level checked was 1.6..      Medications:     Current Outpatient Medications:   •  losartan-hydrochlorothiazide (HYZAAR) 50-12.5 mg per tablet, TAKE 1 TABLET BY MOUTH EVERY DAY, Disp: 90 tablet, Rfl: 1  •  metFORMIN 1,000 mg tablet, TAKE 1 TABLET BY MOUTH TWICE A DAY WITH MEALS, Disp: 60 tablet, Rfl: 0    Past Medical History:  has a past medical history of Diabetes (CMS/HCC) and Hypertension.  Past Surgical History:  has a past surgical history that includes Colonoscopy ().  Social History:  reports that he has never smoked. He has never used smokeless tobacco. He reports previous alcohol use. No history on file for drug use.   Family History: family  "history includes Diabetes in his biological father; Heart failure in his biological father; Lung cancer in his maternal grandfather; No Known Problems in his biological sister; Other in his biological mother; Stroke in his biological father.   Allergies: has No Known Allergies.     Review of Systems  The following have been reviewed and updated as appropriate in this visit:          Objective   Vitals:   Visit Vitals  Ht 1.93 m (6' 4\")   Wt (!) 144 kg (318 lb)   BMI 38.71 kg/m²     Physical Exam  Vitals reviewed.   Constitutional:       Appearance: Normal appearance.   HENT:      Head: Normocephalic and atraumatic.      Mouth/Throat:      Pharynx: Oropharynx is clear.   Eyes:      Conjunctiva/sclera: Conjunctivae normal.   Cardiovascular:      Rate and Rhythm: Normal rate.   Pulmonary:      Effort: Pulmonary effort is normal.   Abdominal:      Comments: Soft, nontender, nondistended.  High BMI.  No guarding or rebound.  No peritoneal signs.   Musculoskeletal:      Cervical back: Neck supple.   Skin:     General: Skin is warm and dry.   Neurological:      General: No focal deficit present.      Mental Status: He is alert and oriented to person, place, and time.   Psychiatric:         Mood and Affect: Mood normal.         Behavior: Behavior normal.              Assessment/Plan   Problem List Items Addressed This Visit        Digestive    Rectal mass - Primary    Relevant Orders    MRI PELVIS WITH AND WITHOUT CONTRAST    Case request operating room: LOW ANTERIOR RESECTION LAPAROSCOPIC ROBOTIC (Completed)    Basic metabolic panel    CBC and differential    Protime-INR    APTT    Type and screen    SARS-CoV-2 (COVID-19), PCR         and I discussed findings of his recent colonoscopy which showed a rectal mass 10 cm from the anal verge.  He did have some metastatic work-up done including a CEA level that is normal and also a CAT scan of his chest abdomen pelvis that confirms asymmetric thickening of the left " aspect of the rectum for which neoplasm could not be excluded.  A few pulmonary nodules measuring up to 7 mm were noted which should be followed up.  I drew him and his spouse pictures for better understanding.  We also briefly discussed various stages of colorectal cancer and there appropriate treatment guidelines.  I would like to obtain a MRI of the pelvis with rectal cancer protocol to assess the relationship of this mass to surrounding structures in his treatment planning.  We also discussed a robotic low anterior resection, possible laparoscopic, possible open, possible ostomy in detail including risk, benefits, complications and alternatives.  I did inform him that I would have urology place lighted stents to help in my portion of the surgery.  I will perform a colonoscopy the day prior to petr this and tattoo this area for surgery.  He showed good understanding of our discussion and asked questions which I answered to satisfaction.  They know to call with any other questions queries or concerns.       Tiarra Martinez MD

## 2021-12-15 ENCOUNTER — TELEPHONE (OUTPATIENT)
Dept: SURGERY | Facility: CLINIC | Age: 56
End: 2021-12-15
Payer: COMMERCIAL

## 2021-12-16 ENCOUNTER — HOSPITAL ENCOUNTER (OUTPATIENT)
Dept: RADIOLOGY | Facility: HOSPITAL | Age: 56
Discharge: HOME | End: 2021-12-16
Attending: SURGERY
Payer: COMMERCIAL

## 2021-12-16 VITALS — WEIGHT: 300 LBS | BODY MASS INDEX: 36.52 KG/M2

## 2021-12-16 DIAGNOSIS — K62.89 RECTAL MASS: ICD-10-CM

## 2021-12-16 PROBLEM — N20.0 KIDNEY STONE: Status: ACTIVE | Noted: 2021-12-16

## 2021-12-16 PROBLEM — R91.8 PULMONARY NODULES: Status: ACTIVE | Noted: 2021-12-16

## 2021-12-16 PROCEDURE — G1004 CDSM NDSC: HCPCS

## 2021-12-16 PROCEDURE — A9585 GADOBUTROL INJECTION: HCPCS | Performed by: SURGERY

## 2021-12-16 RX ORDER — GADOBUTROL 604.72 MG/ML
0.1 INJECTION INTRAVENOUS ONCE
Status: COMPLETED | OUTPATIENT
Start: 2021-12-16 | End: 2021-12-16

## 2021-12-16 RX ADMIN — GADOBUTROL 13.5 MMOL: 604.72 INJECTION INTRAVENOUS at 19:18

## 2021-12-17 ENCOUNTER — CONSULT (OUTPATIENT)
Dept: PRIMARY CARE | Facility: CLINIC | Age: 56
End: 2021-12-17
Payer: COMMERCIAL

## 2021-12-17 VITALS
OXYGEN SATURATION: 98 % | WEIGHT: 315 LBS | RESPIRATION RATE: 16 BRPM | HEIGHT: 76 IN | TEMPERATURE: 98.7 F | BODY MASS INDEX: 38.36 KG/M2 | SYSTOLIC BLOOD PRESSURE: 152 MMHG | HEART RATE: 74 BPM | DIASTOLIC BLOOD PRESSURE: 90 MMHG

## 2021-12-17 DIAGNOSIS — K62.89 RECTAL MASS: ICD-10-CM

## 2021-12-17 DIAGNOSIS — Z01.818 PREOP EXAMINATION: Primary | ICD-10-CM

## 2021-12-17 PROCEDURE — 3077F SYST BP >= 140 MM HG: CPT | Performed by: FAMILY MEDICINE

## 2021-12-17 PROCEDURE — 3080F DIAST BP >= 90 MM HG: CPT | Performed by: FAMILY MEDICINE

## 2021-12-17 PROCEDURE — 99214 OFFICE O/P EST MOD 30 MIN: CPT | Performed by: FAMILY MEDICINE

## 2021-12-17 PROCEDURE — 3008F BODY MASS INDEX DOCD: CPT | Performed by: FAMILY MEDICINE

## 2021-12-17 ASSESSMENT — ENCOUNTER SYMPTOMS
DYSURIA: 0
POLYPHAGIA: 0
CONSTIPATION: 0
HEADACHES: 0
HEMATURIA: 0
STRIDOR: 0
VOMITING: 0
CONFUSION: 0
FEVER: 0
NAUSEA: 0
CHILLS: 0
ABDOMINAL PAIN: 0
BLOOD IN STOOL: 0
COUGH: 0
POLYDIPSIA: 0
NUMBNESS: 0
DIZZINESS: 0
WHEEZING: 0
PALPITATIONS: 0
SHORTNESS OF BREATH: 0
DIARRHEA: 0
WEAKNESS: 0

## 2021-12-17 NOTE — PROGRESS NOTES
"      Subjective      Patient ID: Lake Martin is a 56 y.o. male.  1965      Pre-op  Colon/rectal mass resection      The following have been reviewed and updated as appropriate in this visit:   Problems       Review of Systems   Constitutional: Negative for chills and fever.   HENT: Negative for dental problem and hearing loss.    Eyes: Negative for visual disturbance.   Respiratory: Negative for cough, shortness of breath, wheezing and stridor.    Cardiovascular: Negative for chest pain, palpitations and leg swelling.   Gastrointestinal: Negative for abdominal pain, blood in stool, constipation, diarrhea, nausea and vomiting.   Endocrine: Negative for cold intolerance, heat intolerance, polydipsia, polyphagia and polyuria.   Genitourinary: Negative for dysuria, hematuria and penile discharge.   Musculoskeletal: Negative for gait problem.   Skin: Negative for rash.   Neurological: Negative for dizziness, syncope, weakness, numbness and headaches.   Psychiatric/Behavioral: Negative for confusion.   All other systems reviewed and are negative.      Objective     Vitals:    12/17/21 1017   BP: (!) 152/90   BP Location: Left upper arm   Patient Position: Sitting   Pulse: 74   Resp: 16   Temp: 37.1 °C (98.7 °F)   TempSrc: Temporal   SpO2: 98%   Weight: (!) 144 kg (317 lb)   Height: 1.93 m (6' 4\")     Body mass index is 38.59 kg/m².    Physical Exam  Vitals and nursing note reviewed.   Constitutional:       Appearance: Normal appearance. He is well-developed.   HENT:      Head: Normocephalic and atraumatic.      Right Ear: Tympanic membrane, ear canal and external ear normal.      Left Ear: Tympanic membrane, ear canal and external ear normal.      Nose: Nose normal.      Mouth/Throat:      Mouth: Mucous membranes are moist.      Pharynx: Oropharynx is clear.   Eyes:      General: No scleral icterus.     Conjunctiva/sclera: Conjunctivae normal.      Pupils: Pupils are equal, round, and reactive to light. "   Neck:      Thyroid: No thyromegaly.      Vascular: No carotid bruit or JVD.      Trachea: No tracheal deviation.   Cardiovascular:      Rate and Rhythm: Normal rate and regular rhythm.      Heart sounds: Normal heart sounds. No murmur heard.    No friction rub. No gallop.   Pulmonary:      Effort: Pulmonary effort is normal.      Breath sounds: Normal breath sounds.   Abdominal:      General: Bowel sounds are normal. There is no distension.      Palpations: Abdomen is soft. There is no mass.      Tenderness: There is no abdominal tenderness. There is no guarding or rebound.      Hernia: No hernia is present.   Musculoskeletal:         General: No deformity.      Cervical back: Neck supple.      Right lower leg: No edema.      Left lower leg: No edema.   Lymphadenopathy:      Cervical: No cervical adenopathy.   Skin:     General: Skin is warm and dry.      Findings: No rash.   Neurological:      General: No focal deficit present.      Mental Status: He is alert.   Psychiatric:         Mood and Affect: Mood normal.         Behavior: Behavior normal.         Assessment/Plan   Diagnoses and all orders for this visit:    Preop examination (Primary)  Comments:  Medically stable for proposed surgery assuming PATs acceptable    Rectal mass

## 2021-12-21 ENCOUNTER — LAB REQUISITION (OUTPATIENT)
Dept: LAB | Facility: HOSPITAL | Age: 56
End: 2021-12-21
Payer: COMMERCIAL

## 2021-12-21 DIAGNOSIS — D49.0 NEOPLASM OF UNSPECIFIED BEHAVIOR OF DIGESTIVE SYSTEM: ICD-10-CM

## 2021-12-21 DIAGNOSIS — K57.30 DIVERTICULOSIS OF LARGE INTESTINE WITHOUT PERFORATION OR ABSCESS WITHOUT BLEEDING: ICD-10-CM

## 2021-12-21 DIAGNOSIS — Z12.11 ENCOUNTER FOR SCREENING FOR MALIGNANT NEOPLASM OF COLON: ICD-10-CM

## 2021-12-21 DIAGNOSIS — K63.5 POLYP OF COLON: ICD-10-CM

## 2021-12-21 LAB
CASE RPRT: NORMAL
PATH REPORT.FINAL DX SPEC: NORMAL
PATH REPORT.FINAL DX SPEC: NORMAL
PATH REPORT.GROSS SPEC: NORMAL

## 2021-12-21 PROCEDURE — 88305 TISSUE EXAM BY PATHOLOGIST: CPT | Performed by: SURGERY

## 2021-12-21 ASSESSMENT — PAIN SCALES - GENERAL: PAINLEVEL: 0-NO PAIN

## 2021-12-22 ENCOUNTER — HOSPITAL ENCOUNTER (OUTPATIENT)
Dept: CARDIOLOGY | Facility: HOSPITAL | Age: 56
Discharge: HOME | End: 2021-12-22
Attending: SURGERY
Payer: COMMERCIAL

## 2021-12-22 ENCOUNTER — TRANSCRIBE ORDERS (OUTPATIENT)
Dept: SURGERY | Facility: CLINIC | Age: 56
End: 2021-12-22

## 2021-12-22 ENCOUNTER — OFFICE VISIT (OUTPATIENT)
Dept: PREADMISSION TESTING | Facility: HOSPITAL | Age: 56
End: 2021-12-22
Attending: SURGERY
Payer: COMMERCIAL

## 2021-12-22 ENCOUNTER — OFFICE VISIT (OUTPATIENT)
Dept: SURGERY | Facility: CLINIC | Age: 56
End: 2021-12-22
Payer: COMMERCIAL

## 2021-12-22 ENCOUNTER — TELEPHONE (OUTPATIENT)
Dept: PRIMARY CARE | Facility: CLINIC | Age: 56
End: 2021-12-22
Payer: COMMERCIAL

## 2021-12-22 VITALS
TEMPERATURE: 97.1 F | HEART RATE: 75 BPM | OXYGEN SATURATION: 98 % | HEIGHT: 76 IN | SYSTOLIC BLOOD PRESSURE: 186 MMHG | WEIGHT: 314.9 LBS | RESPIRATION RATE: 16 BRPM | DIASTOLIC BLOOD PRESSURE: 100 MMHG | BODY MASS INDEX: 38.35 KG/M2

## 2021-12-22 VITALS — BODY MASS INDEX: 38.24 KG/M2 | WEIGHT: 314 LBS | HEIGHT: 76 IN

## 2021-12-22 DIAGNOSIS — I10 PRIMARY HYPERTENSION: ICD-10-CM

## 2021-12-22 DIAGNOSIS — Z01.818 PRE-OP EVALUATION: Primary | ICD-10-CM

## 2021-12-22 DIAGNOSIS — Z01.818 ENCOUNTER FOR OTHER PREPROCEDURAL EXAMINATION: ICD-10-CM

## 2021-12-22 DIAGNOSIS — K62.89 RECTAL MASS: ICD-10-CM

## 2021-12-22 DIAGNOSIS — K62.89 RECTAL MASS: Primary | ICD-10-CM

## 2021-12-22 DIAGNOSIS — Z01.818 ENCOUNTER FOR OTHER PREPROCEDURAL EXAMINATION: Primary | ICD-10-CM

## 2021-12-22 DIAGNOSIS — E11.9 TYPE 2 DIABETES MELLITUS WITHOUT COMPLICATION, WITHOUT LONG-TERM CURRENT USE OF INSULIN (CMS/HCC): ICD-10-CM

## 2021-12-22 LAB
ABO + RH BLD: NORMAL
APTT PPP: 26 SEC (ref 23–35)
ATRIAL RATE: 65
BLD GP AB SCN SERPL QL: NEGATIVE
D AG BLD QL: POSITIVE
INR PPP: 1
LABORATORY COMMENT REPORT: NORMAL
P AXIS: 67
PR INTERVAL: 178
PROTHROMBIN TIME: 13.1 SEC (ref 12.2–14.5)
QRS DURATION: 92
QT INTERVAL: 406
QTC CALCULATION(BAZETT): 422
R AXIS: -24
SPECIMEN EXP DATE BLD: NORMAL
T WAVE AXIS: 22
VENTRICULAR RATE: 65

## 2021-12-22 PROCEDURE — 85610 PROTHROMBIN TIME: CPT | Performed by: HOSPITALIST

## 2021-12-22 PROCEDURE — 86850 RBC ANTIBODY SCREEN: CPT

## 2021-12-22 PROCEDURE — 99215 OFFICE O/P EST HI 40 MIN: CPT | Performed by: SURGERY

## 2021-12-22 PROCEDURE — 3080F DIAST BP >= 90 MM HG: CPT | Performed by: HOSPITALIST

## 2021-12-22 PROCEDURE — 3077F SYST BP >= 140 MM HG: CPT | Performed by: HOSPITALIST

## 2021-12-22 PROCEDURE — 3008F BODY MASS INDEX DOCD: CPT | Performed by: HOSPITALIST

## 2021-12-22 PROCEDURE — 36415 COLL VENOUS BLD VENIPUNCTURE: CPT | Performed by: HOSPITALIST

## 2021-12-22 PROCEDURE — 85730 THROMBOPLASTIN TIME PARTIAL: CPT | Performed by: HOSPITALIST

## 2021-12-22 PROCEDURE — 99205 OFFICE O/P NEW HI 60 MIN: CPT | Performed by: HOSPITALIST

## 2021-12-22 PROCEDURE — 93005 ELECTROCARDIOGRAM TRACING: CPT

## 2021-12-22 PROCEDURE — 3008F BODY MASS INDEX DOCD: CPT | Performed by: SURGERY

## 2021-12-22 ASSESSMENT — PAIN SCALES - GENERAL: PAINLEVEL: 0-NO PAIN

## 2021-12-22 NOTE — CONSULTS
[facility] Medicine Service -  Pre-Operative Consultation       Patient Name: Lake Martin  Referring Surgeon: Dr. Tiarra Martinez    Reason for Referral: Pre-Operative Evaluation  Surgical Procedure: robotic laparoscopic low anterior colon resection  Operative Date: 12/30/2021    PCP: Leland Fishman MD        HISTORY OF PRESENT ILLNESS      Lake Martin is a 56 y.o. male presenting today to the [facility] at [place] for pre-operative evaluation prior to planned surgery.    Regarding the current issue:  Lake had a screening colonoscopy recently and was found to have a rectal mass.  He was having no GI symptoms and this was his first screening colonoscopy.  Biopsies showed tubular adenoma with minute focus of high-grade dysplasia.  His CEA level was normal.  He had a CT showing asymmetric wall thickening of the rectum, possibly due to neoplasm.  He had a few tiny pulmonary nodules which will need to be followed up.  He also had an MRI of the pelvis which staged his tumor at T2N0.  He is anxious but otherwise feels ok.    He is otherwise fairly healthy and has never required hospitalization or had any surgery.     Regarding the pre-op eval:  Overall, the patient has been feeling in good health without issues of chest pain/pressure, dyspnea, or recent hospitalizations/significant infections.     Functionally, the patient is able to ascend a flight of stairs with no dyspnea or chest pain.   Functional capacity is good,  > 4 METS.    The patient denies, on specific questioning, the following:  No history of MI, arrhythmia, valvular heart disease or CHF.  No history of VERONICA.  No history of DVT/PE.  No history of COPD.  No history of CVA or TIA.  + history of DM, on metformin only; last A1c in March was 6.7%; sugars around  at home normally  No history of CKD.     No history of difficult airway/difficult intubation.  No history of adverse reaction to anesthesia  "in the past.    Additional comments in regards to the patient's additional medical history:    HTN -- on losartan-HCTZ      PAST MEDICAL AND SURGICAL HISTORY      Past Medical History:   Diagnosis Date   • Arthritis     knees   • COVID-19 vaccine series completed     pfizer plus booster   • Diabetes (CMS/HCC)    • Eye injury     Right eye pressure injury   • Hypertension    • Migraine     hasnt had one in approx 2 yrs -2019   • Type 2 diabetes mellitus (CMS/HCC)        Past Surgical History:   Procedure Laterality Date   • COLONOSCOPY  2021       MEDICATIONS        Current Outpatient Medications:   •  losartan-hydrochlorothiazide (HYZAAR) 50-12.5 mg per tablet, TAKE 1 TABLET BY MOUTH EVERY DAY (Patient taking differently: nightly.  ), Disp: 90 tablet, Rfl: 1  •  metFORMIN 1,000 mg tablet, TAKE 1 TABLET BY MOUTH TWICE A DAY WITH MEALS (Patient taking differently: Lunch and bedtime ), Disp: 60 tablet, Rfl: 0  •  NON FORMULARY MEDICATION REQUEST, nightly. Juice plus vegetable and fruit 1 capsule po nightly , Disp: , Rfl:     ALLERGIES      Patient has no known allergies.    FAMILY HISTORY      family history includes Diabetes in his biological father; Heart failure in his biological father; Lung cancer in his maternal grandfather; No Known Problems in his biological sister; Other in his biological mother; Stroke in his biological father.    Denies any prior known family history of DVTs/PEs/clotting disorder    SOCIAL HISTORY      Social History     Tobacco Use   • Smoking status: Never Smoker   • Smokeless tobacco: Never Used   Vaping Use   • Vaping Use: Never used   Substance Use Topics   • Alcohol use: Not Currently   • Drug use: Never       REVIEW OF SYSTEMS      All other systems reviewed and negative except as noted in HPI    PHYSICAL EXAMINATION      Visit Vitals  BP (!) 186/100   Pulse 75   Temp 36.2 °C (97.1 °F) (Oral)   Resp 16   Ht 1.93 m (6' 4\")   Wt (!) 143 kg (314 lb 14.4 oz)   SpO2 98%   BMI 38.33 kg/m² "     Body mass index is 38.33 kg/m².     Elevated BP, see below    Physical Exam  General: well-appearing WM, nontoxic appearing, no acute distress  HEENT: Moist membranes, PERRL, anicteric sclera   Neck: supple, no JVD  Cardiac: RRR, no murmur, no rub   Lungs: clear bilaterally, no wheezing/rales/rhonchi  Abdomen: soft, NT/ND, +BS, no rebound/guarding   Extremities: no edema, distal perfusion intact  Neuro: AAOx3, nonfocal. CN's intact grossly. No focal motor deficit. No sensory deficit.  Skin: no rash. Clean, dry, intact.   Psych: cooperative    LABS / EKG        Labs  Today's labs reviewed. No issues.    Lab Results   Component Value Date     12/07/2021    K 4.0 12/07/2021     12/07/2021    BUN 5 (L) 12/07/2021    GLUCOSE 155 (H) 12/07/2021    CREATININE 0.8 12/07/2021    WBC 8.75 12/07/2021    HGB 15.2 12/07/2021    HCT 45.1 12/07/2021     12/07/2021    ALT 20 12/07/2021    AST 19 12/07/2021    INR 1.0 12/22/2021    HGBA1C 6.7 (H) 03/05/2021         ECG   Reviewed. 12/22/21 -- NSR, poss LVH    ASSESSMENT AND PLAN         Primary hypertension  Pt instructed to hold his losartan-HCTZ on the night prior to surgery  Can be resumed post-op as BPs allow  BPs elevated in PAT, pt without symptoms; likely in setting of anxiety  Pt recommended to go to ED but he is not inclined and I agree that it would likely increase his stress at this time; pt aware of risk of stroke with elevated BPs  Recommended he take an extra dose of his BP medication when he gets home and recheck BPs this afternoon; if persistently elevated, he will need to contact his PCP for further evaluation/recommendations  Also recommended he start checking his pressures at varied times of day as he may have poor overall control (BP was 150/90 at PCP visit)    Type 2 diabetes mellitus without complication, without long-term current use of insulin (CMS/[facility])  A1c in March 2021 was 6.7%  Sugars at home run   Pt will hold his metformin  on day of surgery; he takes at lunch and bedtime    Rectal mass  Robotic laparoscopic low anterior colon resection planned for 12/30/2021  See below for statement in regards to perioperative risk  The patient was instructed to stop taking any NSAIDS per the surgeon's discretion  Recommend DVT prophylaxis at the discretion of the surgeon  Incentive spirometry and early ambulation post op  Post op bowel regimen  If present, recommend removal of [place] catheter as early as possible to prevent infection  Monitor CBC, BMP, and volume status in the perioperative period       In regards to perioperative cardiac risk:  The patient denies any history of ischemic heart disease, denies any history of CHF, denies any history of CVA, is not on pre-operative treatment with insulin, and does not have a pre-operative creatinine > 2 mg/dL.   The Revised Cardiac Risk Index (RCRI) = 0 for this patient which indicates a 0.4% risk of major adverse cardiac event in the perioperative period for this intermediate risk procedure.     VERONICA screening:  STOP-BANG screening for obstructive sleep apnea = 7, which indicates the patient is high risk for having underlying VERONICA.     Sleep apnea places the patient at relatively increased risk (compared to a population without this diagnosis) of oxygen desaturation, cardiac events, acute respiratory failure, or an ICU transfer.     In the post op period: recommend use of our sleep apnea protocol in the PACU, close monitoring, pulse ox monitoring, consider extubate to BIPAP; pulmonary consultation if necessary.  Reduce narcotic medication dosage as much as possible.       Additional comments:  - pt will take no meds on AM of surgery       Please do not hesitate to contact [facility] during the upcoming hospitalization with any questions or concerns.     Lisa Bonilla MD  12/22/2021

## 2021-12-22 NOTE — ASSESSMENT & PLAN NOTE
Robotic laparoscopic low anterior colon resection planned for 12/30/2021  See below for statement in regards to perioperative risk  The patient was instructed to stop taking any NSAIDS per the surgeon's discretion  Recommend DVT prophylaxis at the discretion of the surgeon  Incentive spirometry and early ambulation post op  Post op bowel regimen  If present, recommend removal of [place] catheter as early as possible to prevent infection  Monitor CBC, BMP, and volume status in the perioperative period

## 2021-12-22 NOTE — LETTER
2021     Leland Fishman MD  [address]  [place] [address]    Patient: Lake Martin  YOB: 1965  Date of Visit: 2021      Dear Dr. Fishman:    Thank you for referring Lake Martin to me for evaluation. Below are my notes for this consultation.    If you have questions, please do not hesitate to call me. I look forward to following your patient along with you.         Sincerely,        Tiarra Martinez MD        CC: No Recipients  Tiarra Martinez MD  2021  4:45 PM  Sign when Signing Visit  Patient ID: Lake Martin                              : 1965  MRN: [id number]                                            Visit Date: 2021  Encounter Provider: Tiarra Martinez  Referring Provider: Leland Fishman MD    Subjective   Chief Complaint: Follow-up (MRI)      Lake Martin is a 56 y.o. old male presenting today for a preoperative visit for his upcoming surgery for a newly diagnosed rectal mass.  He did have a preoperative MRI on 2021 that stages it had a T2N0.  He currently denies any GI symptoms.  He otherwise feels well.  Preoperative CEA level checked was at 1.6.  For rest of the HPI refer to the previous office note.  Review of systems otherwise negative..      Medications:     Current Outpatient Medications:   •  losartan-hydrochlorothiazide (HYZAAR) 50-12.5 mg per tablet, TAKE 1 TABLET BY MOUTH EVERY DAY (Patient taking differently: nightly.  ), Disp: 90 tablet, Rfl: 1  •  metFORMIN 1,000 mg tablet, TAKE 1 TABLET BY MOUTH TWICE A DAY WITH MEALS (Patient taking differently: Lunch and bedtime ), Disp: 60 tablet, Rfl: 0  •  NON FORMULARY MEDICATION REQUEST, nightly. Juice plus vegetable and fruit 1 capsule po nightly , Disp: , Rfl:     Past Medical History:  has a past medical history of Arthritis, COVID-19 vaccine series completed, Diabetes (CMS/[facility]), Eye injury, Hypertension, Migraine, and Type 2 diabetes mellitus  "(CMS/[facility]).He has no past medical history of History of transfusion.  Past Surgical History:  has a past surgical history that includes Colonoscopy (2021).  Social History:   Social History     Tobacco Use   • Smoking status: Never Smoker   • Smokeless tobacco: Never Used   Vaping Use   • Vaping Use: Never used   Substance Use Topics   • Alcohol use: Not Currently   • Drug use: Never     Family History: family history includes Diabetes in his biological father; Heart failure in his biological father; Lung cancer in his maternal grandfather; No Known Problems in his biological sister; Other in his biological mother; Stroke in his biological father.  Allergies: has No Known Allergies.     Review of Systems  The following have been reviewed and updated as appropriate in this visit:          Objective   Vitals:   Visit Vitals  Ht 1.93 m (6' 4\")   Wt (!) 142 kg (314 lb)   BMI 38.22 kg/m²     Physical Exam  Vitals reviewed.   Constitutional:       Appearance: Normal appearance.   HENT:      Head: Normocephalic and atraumatic.      Mouth/Throat:      Pharynx: Oropharynx is clear.   Eyes:      Conjunctiva/sclera: Conjunctivae normal.   Cardiovascular:      Rate and Rhythm: Normal rate.   Pulmonary:      Effort: Pulmonary effort is normal.   Abdominal:      Comments: Soft, nontender, nondistended.  High BMI.  No guarding or rebound.  No peritoneal signs.   Musculoskeletal:      Cervical back: Neck supple.   Skin:     General: Skin is warm and dry.   Neurological:      General: No focal deficit present.      Mental Status: He is alert and oriented to person, place, and time.   Psychiatric:         Mood and Affect: Mood normal.         Behavior: Behavior normal.           Assessment/Plan   Problem List Items Addressed This Visit        Digestive    Rectal mass - Primary        Mr. Martin, his wife and I sat down and discussed details of the surgery today.  We talked about the preoperative colonoscopy with tattooing.  We also " discussed a robotic low anterior resection, possible laparoscopic, possible open, possible ostomy in detail including risks, benefits, complications and alternatives.  I drew them pictures again for better understanding.  We talked about bleeding, infection, anastomotic leak, ureteral injury, injury to pelvic nerves and vessels, injury to surrounding organs amongst others.  They showed good understanding of our discussion and asked several questions which I answered to their satisfaction.  They did have a preoperative planning visit with urology Dr. Payne which they tell me was a big help.  He is also been to his PAT appointment.  We went over timings of things and discussed colorectal cancer staging in brief and their respective treatments.  He knows to call with any further questions queries or concerns.       Tiarra Martinez MD

## 2021-12-22 NOTE — ASSESSMENT & PLAN NOTE
A1c in March 2021 was 6.7%  Sugars at home run   Pt will hold his metformin on day of surgery; he takes at lunch and bedtime

## 2021-12-22 NOTE — ASSESSMENT & PLAN NOTE
Pt instructed to hold his losartan-HCTZ on the night prior to surgery  Can be resumed post-op as BPs allow  BPs elevated in PAT, pt without symptoms; likely in setting of anxiety  Pt recommended to go to ED but he is not inclined and I agree that it would likely increase his stress at this time; pt aware of risk of stroke with elevated BPs  Recommended he take an extra dose of his BP medication when he gets home and recheck BPs this afternoon; if persistently elevated, he will need to contact his PCP for further evaluation/recommendations  Also recommended he start checking his pressures at varied times of day as he may have poor overall control (BP was 150/90 at PCP visit)

## 2021-12-22 NOTE — PROGRESS NOTES
Patient ID: Lake Martin                              : 1965  MRN: [id number]                                            Visit Date: 2021  Encounter Provider: Tiarra Martinez  Referring Provider: Leland Fishman MD    Subjective   Chief Complaint: Follow-up (MRI)      Lake Martin is a 56 y.o. old male presenting today for a preoperative visit for his upcoming surgery for a newly diagnosed rectal mass.  He did have a preoperative MRI on 2021 that stages it had a T2N0.  He currently denies any GI symptoms.  He otherwise feels well.  Preoperative CEA level checked was at 1.6.  For rest of the HPI refer to the previous office note.  Review of systems otherwise negative..      Medications:     Current Outpatient Medications:   •  losartan-hydrochlorothiazide (HYZAAR) 50-12.5 mg per tablet, TAKE 1 TABLET BY MOUTH EVERY DAY (Patient taking differently: nightly.  ), Disp: 90 tablet, Rfl: 1  •  metFORMIN 1,000 mg tablet, TAKE 1 TABLET BY MOUTH TWICE A DAY WITH MEALS (Patient taking differently: Lunch and bedtime ), Disp: 60 tablet, Rfl: 0  •  NON FORMULARY MEDICATION REQUEST, nightly. Juice plus vegetable and fruit 1 capsule po nightly , Disp: , Rfl:     Past Medical History:  has a past medical history of Arthritis, COVID-19 vaccine series completed, Diabetes (CMS/[facility]), Eye injury, Hypertension, Migraine, and Type 2 diabetes mellitus (CMS/[facility]).He has no past medical history of History of transfusion.  Past Surgical History:  has a past surgical history that includes Colonoscopy ().  Social History:   Social History     Tobacco Use   • Smoking status: Never Smoker   • Smokeless tobacco: Never Used   Vaping Use   • Vaping Use: Never used   Substance Use Topics   • Alcohol use: Not Currently   • Drug use: Never     Family History: family history includes Diabetes in his biological father; Heart failure in his biological father; Lung cancer in his maternal grandfather; No Known Problems in his  "biological sister; Other in his biological mother; Stroke in his biological father.  Allergies: has No Known Allergies.     Review of Systems  The following have been reviewed and updated as appropriate in this visit:          Objective   Vitals:   Visit Vitals  Ht 1.93 m (6' 4\")   Wt (!) 142 kg (314 lb)   BMI 38.22 kg/m²     Physical Exam  Vitals reviewed.   Constitutional:       Appearance: Normal appearance.   HENT:      Head: Normocephalic and atraumatic.      Mouth/Throat:      Pharynx: Oropharynx is clear.   Eyes:      Conjunctiva/sclera: Conjunctivae normal.   Cardiovascular:      Rate and Rhythm: Normal rate.   Pulmonary:      Effort: Pulmonary effort is normal.   Abdominal:      Comments: Soft, nontender, nondistended.  High BMI.  No guarding or rebound.  No peritoneal signs.   Musculoskeletal:      Cervical back: Neck supple.   Skin:     General: Skin is warm and dry.   Neurological:      General: No focal deficit present.      Mental Status: He is alert and oriented to person, place, and time.   Psychiatric:         Mood and Affect: Mood normal.         Behavior: Behavior normal.           Assessment/Plan   Problem List Items Addressed This Visit        Digestive    Rectal mass - Primary        Mr. Martin, his wife and I sat down and discussed details of the surgery today.  We talked about the preoperative colonoscopy with tattooing.  We also discussed a robotic low anterior resection, possible laparoscopic, possible open, possible ostomy in detail including risks, benefits, complications and alternatives.  I drew them pictures again for better understanding.  We talked about bleeding, infection, anastomotic leak, ureteral injury, injury to pelvic nerves and vessels, injury to surrounding organs amongst others.  They showed good understanding of our discussion and asked several questions which I answered to their satisfaction.  They did have a preoperative planning visit with urology Dr. Payne which they " tell me was a big help.  He is also been to his PAT appointment.  We went over timings of things and discussed colorectal cancer staging in brief and their respective treatments.  He knows to call with any further questions queries or concerns.       Tiarra Martinez MD

## 2021-12-22 NOTE — H&P (VIEW-ONLY)
Patient ID: Lake Martin                              : 1965  MRN: [id number]                                            Visit Date: 2021  Encounter Provider: Tiarra Martinez  Referring Provider: Leland Fishman MD    Subjective   Chief Complaint: Follow-up (MRI)      Lake Martin is a 56 y.o. old male presenting today for a preoperative visit for his upcoming surgery for a newly diagnosed rectal mass.  He did have a preoperative MRI on 2021 that stages it had a T2N0.  He currently denies any GI symptoms.  He otherwise feels well.  Preoperative CEA level checked was at 1.6.  For rest of the HPI refer to the previous office note.  Review of systems otherwise negative..      Medications:     Current Outpatient Medications:   •  losartan-hydrochlorothiazide (HYZAAR) 50-12.5 mg per tablet, TAKE 1 TABLET BY MOUTH EVERY DAY (Patient taking differently: nightly.  ), Disp: 90 tablet, Rfl: 1  •  metFORMIN 1,000 mg tablet, TAKE 1 TABLET BY MOUTH TWICE A DAY WITH MEALS (Patient taking differently: Lunch and bedtime ), Disp: 60 tablet, Rfl: 0  •  NON FORMULARY MEDICATION REQUEST, nightly. Juice plus vegetable and fruit 1 capsule po nightly , Disp: , Rfl:     Past Medical History:  has a past medical history of Arthritis, COVID-19 vaccine series completed, Diabetes (CMS/[facility]), Eye injury, Hypertension, Migraine, and Type 2 diabetes mellitus (CMS/[facility]).He has no past medical history of History of transfusion.  Past Surgical History:  has a past surgical history that includes Colonoscopy ().  Social History:   Social History     Tobacco Use   • Smoking status: Never Smoker   • Smokeless tobacco: Never Used   Vaping Use   • Vaping Use: Never used   Substance Use Topics   • Alcohol use: Not Currently   • Drug use: Never     Family History: family history includes Diabetes in his biological father; Heart failure in his biological father; Lung cancer in his maternal grandfather; No Known Problems in his  "biological sister; Other in his biological mother; Stroke in his biological father.  Allergies: has No Known Allergies.     Review of Systems  The following have been reviewed and updated as appropriate in this visit:          Objective   Vitals:   Visit Vitals  Ht 1.93 m (6' 4\")   Wt (!) 142 kg (314 lb)   BMI 38.22 kg/m²     Physical Exam  Vitals reviewed.   Constitutional:       Appearance: Normal appearance.   HENT:      Head: Normocephalic and atraumatic.      Mouth/Throat:      Pharynx: Oropharynx is clear.   Eyes:      Conjunctiva/sclera: Conjunctivae normal.   Cardiovascular:      Rate and Rhythm: Normal rate.   Pulmonary:      Effort: Pulmonary effort is normal.   Abdominal:      Comments: Soft, nontender, nondistended.  High BMI.  No guarding or rebound.  No peritoneal signs.   Musculoskeletal:      Cervical back: Neck supple.   Skin:     General: Skin is warm and dry.   Neurological:      General: No focal deficit present.      Mental Status: He is alert and oriented to person, place, and time.   Psychiatric:         Mood and Affect: Mood normal.         Behavior: Behavior normal.           Assessment/Plan   Problem List Items Addressed This Visit        Digestive    Rectal mass - Primary        Mr. Martin, his wife and I sat down and discussed details of the surgery today.  We talked about the preoperative colonoscopy with tattooing.  We also discussed a robotic low anterior resection, possible laparoscopic, possible open, possible ostomy in detail including risks, benefits, complications and alternatives.  I drew them pictures again for better understanding.  We talked about bleeding, infection, anastomotic leak, ureteral injury, injury to pelvic nerves and vessels, injury to surrounding organs amongst others.  They showed good understanding of our discussion and asked several questions which I answered to their satisfaction.  They did have a preoperative planning visit with urology Dr. Payne which they " tell me was a big help.  He is also been to his PAT appointment.  We went over timings of things and discussed colorectal cancer staging in brief and their respective treatments.  He knows to call with any further questions queries or concerns.       Tiarra Martinez MD

## 2021-12-22 NOTE — PRE-PROCEDURE INSTRUCTIONS
1. You will be called between 3pm-7pm one business day before your procedure to tell you where and when to report. If you do not receive a phone call by 7pm, please call the Admissions office at [phone].    2. Please report to Admissions or Short Procedure Unit on the day of your procedure.   Detailed directions will be given to you when you are called with arrival time.        3. Please follow the following fasting guidelines:     No solid food EIGHT HOURS prior to surgery.  Unlimited clear liquids, meaning water or PLAIN black coffee WITHOUT any milk, cream, sugar, or sweetener are permitted up to TWO HOURS prior to arrival at the hospital.    4. Early on the morning of the procedure please take your usual dose of the listed medications with a sip of water:  No medications on the morning of surgery as per Dr Bonilla    5. Other Instructions: You may brush your teeth the morning of the procedure. Rinse and spit, do not swallow.  Bring a list of your medications with dosages.  Use surgical wash as directed.     6. If you develop a cold, cough, fever, rash, or other symptom prior to the day of the procedure, please report it to your physician immediately.    7. If you need to cancel the procedure for any reason, please contact your physician.    8. Make arrangements to have someone drive you home from the procedure. If you have not arranged for transportation home, your surgery may be cancelled.     9. You may not take public transportation unless accompanied by a responsible person.    10. You may not drive a car or operate complex or potentially dangerous machinery for 24 hours following anesthesia and/or sedation.    11.  If it is medically necessary for you to have a longer stay, you will be informed as soon as the decision is made.    12. Only bring essential items to the hospital.  Do not wear or bring anything of value to the hospital including jewelry of any kind, money, or wallet. Do not wear make-up or  contact lenses.  DO NOT BRING MEDICATIONS FROM HOME unless instructed to do so. DO bring your hearing aids, glasses, and a case    13. No lotion, creams, powders, or oils on skin the morning of procedure     14. Dress in comfortable clothes.    15.  If instructed, please bring a copy of your Advanced Directive (Living Will/Durable Power of ) on the day of your procedure.     16. Patients need to quarantine from the time of PAT COVID test to day of surgery, regardless of COVID vaccine status.      17. Ensuring your safety at all times is a very important part of our [facility] Culture of Safety. After having surgery and sedation, you are at risk for falling and balance issues. Although you may feel awake, the effects of the medication can last up to 24 hours after anesthesia. If you need to use the bathroom during your recovery period, nursing staff will escort you there and stay with you to ensure your safety.    18. Refrain from drinking alcohol and smoking cigarettes for 24 hours prior to surgery.    19. Shower with antibacterial soap (Dial) the night before and morning of your procedure.  If your procedure indicates the need for CHG antiseptic wash (Bactoshield or Hibiclens), please use this instead and follow instructions as discussed at the time of your Pre-Admission Testing phone interview or visit.    Above instructions reviewed with patient and patient acknowledges understanding.    Form explained by: Macarena Quiroga LPN

## 2021-12-22 NOTE — TELEPHONE ENCOUNTER
pts bp has been evelvated. Pt had clearance at the hospital today bp was still elevated . 170/100 1st time, 170/100 2nd time 160/90 3rd time . They were recomending him to take two losartan  a day, pt did take a second tablet when he got home.  what are your thoughts?

## 2021-12-22 NOTE — H&P (VIEW-ONLY)
Patient ID: Lake Martin                              : 1965  MRN: [id number]                                            Visit Date: 2021  Encounter Provider: Tiarra Martinez  Referring Provider: Leland Fishman MD    Subjective   Chief Complaint: Follow-up (MRI)      Lake Martin is a 56 y.o. old male presenting today for a preoperative visit for his upcoming surgery for a newly diagnosed rectal mass.  He did have a preoperative MRI on 2021 that stages it had a T2N0.  He currently denies any GI symptoms.  He otherwise feels well.  Preoperative CEA level checked was at 1.6.  For rest of the HPI refer to the previous office note.  Review of systems otherwise negative..      Medications:     Current Outpatient Medications:   •  losartan-hydrochlorothiazide (HYZAAR) 50-12.5 mg per tablet, TAKE 1 TABLET BY MOUTH EVERY DAY (Patient taking differently: nightly.  ), Disp: 90 tablet, Rfl: 1  •  metFORMIN 1,000 mg tablet, TAKE 1 TABLET BY MOUTH TWICE A DAY WITH MEALS (Patient taking differently: Lunch and bedtime ), Disp: 60 tablet, Rfl: 0  •  NON FORMULARY MEDICATION REQUEST, nightly. Juice plus vegetable and fruit 1 capsule po nightly , Disp: , Rfl:     Past Medical History:  has a past medical history of Arthritis, COVID-19 vaccine series completed, Diabetes (CMS/[facility]), Eye injury, Hypertension, Migraine, and Type 2 diabetes mellitus (CMS/[facility]).He has no past medical history of History of transfusion.  Past Surgical History:  has a past surgical history that includes Colonoscopy ().  Social History:   Social History     Tobacco Use   • Smoking status: Never Smoker   • Smokeless tobacco: Never Used   Vaping Use   • Vaping Use: Never used   Substance Use Topics   • Alcohol use: Not Currently   • Drug use: Never     Family History: family history includes Diabetes in his biological father; Heart failure in his biological father; Lung cancer in his maternal grandfather; No Known Problems in his  "biological sister; Other in his biological mother; Stroke in his biological father.  Allergies: has No Known Allergies.     Review of Systems  The following have been reviewed and updated as appropriate in this visit:          Objective   Vitals:   Visit Vitals  Ht 1.93 m (6' 4\")   Wt (!) 142 kg (314 lb)   BMI 38.22 kg/m²     Physical Exam  Vitals reviewed.   Constitutional:       Appearance: Normal appearance.   HENT:      Head: Normocephalic and atraumatic.      Mouth/Throat:      Pharynx: Oropharynx is clear.   Eyes:      Conjunctiva/sclera: Conjunctivae normal.   Cardiovascular:      Rate and Rhythm: Normal rate.   Pulmonary:      Effort: Pulmonary effort is normal.   Abdominal:      Comments: Soft, nontender, nondistended.  High BMI.  No guarding or rebound.  No peritoneal signs.   Musculoskeletal:      Cervical back: Neck supple.   Skin:     General: Skin is warm and dry.   Neurological:      General: No focal deficit present.      Mental Status: He is alert and oriented to person, place, and time.   Psychiatric:         Mood and Affect: Mood normal.         Behavior: Behavior normal.           Assessment/Plan   Problem List Items Addressed This Visit        Digestive    Rectal mass - Primary        Mr. Martin, his wife and I sat down and discussed details of the surgery today.  We talked about the preoperative colonoscopy with tattooing.  We also discussed a robotic low anterior resection, possible laparoscopic, possible open, possible ostomy in detail including risks, benefits, complications and alternatives.  I drew them pictures again for better understanding.  We talked about bleeding, infection, anastomotic leak, ureteral injury, injury to pelvic nerves and vessels, injury to surrounding organs amongst others.  They showed good understanding of our discussion and asked several questions which I answered to their satisfaction.  They did have a preoperative planning visit with urology Dr. Payne which they " tell me was a big help.  He is also been to his PAT appointment.  We went over timings of things and discussed colorectal cancer staging in brief and their respective treatments.  He knows to call with any further questions queries or concerns.       Tiarra Martinez MD

## 2021-12-27 ENCOUNTER — TRANSCRIBE ORDERS (OUTPATIENT)
Dept: REGISTRATION | Facility: HOSPITAL | Age: 56
End: 2021-12-27

## 2021-12-27 ENCOUNTER — APPOINTMENT (OUTPATIENT)
Dept: LAB | Facility: HOSPITAL | Age: 56
End: 2021-12-27
Attending: SURGERY
Payer: COMMERCIAL

## 2021-12-27 ENCOUNTER — TELEPHONE (OUTPATIENT)
Dept: SURGERY | Facility: CLINIC | Age: 56
End: 2021-12-27
Payer: COMMERCIAL

## 2021-12-27 DIAGNOSIS — Z11.59 ENCOUNTER FOR SCREENING FOR OTHER VIRAL DISEASES: Primary | ICD-10-CM

## 2021-12-27 DIAGNOSIS — Z11.59 ENCOUNTER FOR SCREENING FOR OTHER VIRAL DISEASES: ICD-10-CM

## 2021-12-27 LAB — SARS-COV-2 RNA RESP QL NAA+PROBE: NEGATIVE

## 2021-12-27 PROCEDURE — C9803 HOPD COVID-19 SPEC COLLECT: HCPCS

## 2021-12-27 PROCEDURE — U0003 INFECTIOUS AGENT DETECTION BY NUCLEIC ACID (DNA OR RNA); SEVERE ACUTE RESPIRATORY SYNDROME CORONAVIRUS 2 (SARS-COV-2) (CORONAVIRUS DISEASE [COVID-19]), AMPLIFIED PROBE TECHNIQUE, MAKING USE OF HIGH THROUGHPUT TECHNOLOGIES AS DESCRIBED BY CMS-2020-01-R: HCPCS

## 2021-12-28 RX ORDER — METFORMIN HYDROCHLORIDE 1000 MG/1
TABLET ORAL
Qty: 60 TABLET | Refills: 0 | Status: SHIPPED | OUTPATIENT
Start: 2021-12-28 | End: 2022-01-27

## 2021-12-28 NOTE — TELEPHONE ENCOUNTER
Medicine Refill Request    Last Office: 3/5/2021   Last Consult Visit: 12/17/2021  Last Telemedicine Visit: Visit date not found    Next Appointment: Visit date not found      Current Outpatient Medications:   •  losartan-hydrochlorothiazide (HYZAAR) 50-12.5 mg per tablet, TAKE 1 TABLET BY MOUTH EVERY DAY (Patient taking differently: nightly.  ), Disp: 90 tablet, Rfl: 1  •  metFORMIN 1,000 mg tablet, TAKE 1 TABLET BY MOUTH TWICE A DAY WITH MEALS (Patient taking differently: Lunch and bedtime ), Disp: 60 tablet, Rfl: 0  •  NON FORMULARY MEDICATION REQUEST, nightly. Juice plus vegetable and fruit 1 capsule po nightly , Disp: , Rfl:       BP Readings from Last 3 Encounters:   12/22/21 (!) 186/100   12/17/21 (!) 152/90   03/05/21 124/70       Recent Lab results:  Lab Results   Component Value Date    CHOL 185 03/05/2021   ,   Lab Results   Component Value Date    HDL 48 03/05/2021   ,   Lab Results   Component Value Date    LDLCALC 109 (H) 03/05/2021   ,   Lab Results   Component Value Date    TRIG 161 (H) 03/05/2021        Lab Results   Component Value Date    GLUCOSE 155 (H) 12/07/2021   ,   Lab Results   Component Value Date    HGBA1C 6.7 (H) 03/05/2021         Lab Results   Component Value Date    CREATININE 0.8 12/07/2021       Lab Results   Component Value Date    TSH 3.52 12/15/2015

## 2021-12-29 ENCOUNTER — ANESTHESIA (OUTPATIENT)
Dept: ENDOSCOPY | Facility: HOSPITAL | Age: 56
End: 2021-12-29
Payer: COMMERCIAL

## 2021-12-29 ENCOUNTER — ANESTHESIA EVENT (OUTPATIENT)
Dept: ENDOSCOPY | Facility: HOSPITAL | Age: 56
End: 2021-12-29
Payer: COMMERCIAL

## 2021-12-29 ENCOUNTER — HOSPITAL ENCOUNTER (OUTPATIENT)
Facility: HOSPITAL | Age: 56
Discharge: HOME | End: 2021-12-29
Attending: SURGERY | Admitting: SURGERY
Payer: COMMERCIAL

## 2021-12-29 ENCOUNTER — ANESTHESIA EVENT (OUTPATIENT)
Dept: OPERATING ROOM | Facility: HOSPITAL | Age: 56
Setting detail: SURGERY ADMIT
DRG: 330 | End: 2021-12-29
Payer: COMMERCIAL

## 2021-12-29 VITALS
DIASTOLIC BLOOD PRESSURE: 81 MMHG | HEART RATE: 61 BPM | OXYGEN SATURATION: 98 % | SYSTOLIC BLOOD PRESSURE: 156 MMHG | TEMPERATURE: 97.6 F | RESPIRATION RATE: 34 BRPM

## 2021-12-29 DIAGNOSIS — K62.89 RECTAL MASS: ICD-10-CM

## 2021-12-29 LAB
GLUCOSE BLD-MCNC: 146 MG/DL (ref 70–99)
GLUCOSE BLD-MCNC: 154 MG/DL (ref 70–99)
POCT TEST: ABNORMAL
POCT TEST: ABNORMAL

## 2021-12-29 PROCEDURE — 3E0H8KZ INTRODUCTION OF OTHER DIAGNOSTIC SUBSTANCE INTO LOWER GI, VIA NATURAL OR ARTIFICIAL OPENING ENDOSCOPIC: ICD-10-PCS | Performed by: SURGERY

## 2021-12-29 PROCEDURE — 88305 TISSUE EXAM BY PATHOLOGIST: CPT | Performed by: SURGERY

## 2021-12-29 PROCEDURE — 75000014 HC COLONSCOPY DIAGNOSTIC: Performed by: SURGERY

## 2021-12-29 PROCEDURE — 63600000 HC DRUGS/DETAIL CODE: Performed by: NURSE ANESTHETIST, CERTIFIED REGISTERED

## 2021-12-29 PROCEDURE — 63600000 HC DRUGS/DETAIL CODE: Performed by: SURGERY

## 2021-12-29 PROCEDURE — 37000002 HC ANESTHESIA MAC: Performed by: SURGERY

## 2021-12-29 PROCEDURE — 45381 COLONOSCOPY SUBMUCOUS NJX: CPT | Performed by: SURGERY

## 2021-12-29 PROCEDURE — 71000011 HC PACU PHASE 1 EA ADDL MIN: Performed by: SURGERY

## 2021-12-29 PROCEDURE — 0DBM8ZZ EXCISION OF DESCENDING COLON, VIA NATURAL OR ARTIFICIAL OPENING ENDOSCOPIC: ICD-10-PCS | Performed by: SURGERY

## 2021-12-29 PROCEDURE — 71000001 HC PACU PHASE 1 INITIAL 30MIN: Performed by: SURGERY

## 2021-12-29 PROCEDURE — 45385 COLONOSCOPY W/LESION REMOVAL: CPT | Performed by: SURGERY

## 2021-12-29 PROCEDURE — 25000000 HC PHARMACY GENERAL: Performed by: NURSE ANESTHETIST, CERTIFIED REGISTERED

## 2021-12-29 PROCEDURE — 0DBM8ZX EXCISION OF DESCENDING COLON, VIA NATURAL OR ARTIFICIAL OPENING ENDOSCOPIC, DIAGNOSTIC: ICD-10-PCS | Performed by: SURGERY

## 2021-12-29 PROCEDURE — 27200000 HC STERILE SUPPLY: Performed by: SURGERY

## 2021-12-29 RX ORDER — MIDAZOLAM HYDROCHLORIDE 2 MG/2ML
INJECTION, SOLUTION INTRAMUSCULAR; INTRAVENOUS AS NEEDED
Status: DISCONTINUED | OUTPATIENT
Start: 2021-12-29 | End: 2021-12-29 | Stop reason: SURG

## 2021-12-29 RX ORDER — LIDOCAINE HYDROCHLORIDE 10 MG/ML
INJECTION, SOLUTION EPIDURAL; INFILTRATION; INTRACAUDAL; PERINEURAL AS NEEDED
Status: DISCONTINUED | OUTPATIENT
Start: 2021-12-29 | End: 2021-12-29 | Stop reason: SURG

## 2021-12-29 RX ORDER — SODIUM CHLORIDE, SODIUM LACTATE, POTASSIUM CHLORIDE, CALCIUM CHLORIDE 600; 310; 30; 20 MG/100ML; MG/100ML; MG/100ML; MG/100ML
INJECTION, SOLUTION INTRAVENOUS CONTINUOUS
Status: CANCELLED | OUTPATIENT
Start: 2021-12-30 | End: 2021-12-30

## 2021-12-29 RX ORDER — PROPOFOL 200MG/20ML
SYRINGE (ML) INTRAVENOUS AS NEEDED
Status: DISCONTINUED | OUTPATIENT
Start: 2021-12-29 | End: 2021-12-29 | Stop reason: SURG

## 2021-12-29 RX ORDER — SODIUM CHLORIDE, SODIUM LACTATE, POTASSIUM CHLORIDE, CALCIUM CHLORIDE 600; 310; 30; 20 MG/100ML; MG/100ML; MG/100ML; MG/100ML
INJECTION, SOLUTION INTRAVENOUS CONTINUOUS
Status: DISCONTINUED | OUTPATIENT
Start: 2021-12-29 | End: 2021-12-29 | Stop reason: HOSPADM

## 2021-12-29 RX ADMIN — MIDAZOLAM HYDROCHLORIDE 2 MG: 1 INJECTION, SOLUTION INTRAMUSCULAR; INTRAVENOUS at 10:52

## 2021-12-29 RX ADMIN — LIDOCAINE HYDROCHLORIDE 3 ML: 10 INJECTION, SOLUTION EPIDURAL; INFILTRATION; INTRACAUDAL at 10:54

## 2021-12-29 RX ADMIN — PROPOFOL 50 MG: 10 INJECTION, EMULSION INTRAVENOUS at 11:08

## 2021-12-29 RX ADMIN — PROPOFOL 50 MG: 10 INJECTION, EMULSION INTRAVENOUS at 11:13

## 2021-12-29 RX ADMIN — SODIUM CHLORIDE, POTASSIUM CHLORIDE, SODIUM LACTATE AND CALCIUM CHLORIDE: 600; 310; 30; 20 INJECTION, SOLUTION INTRAVENOUS at 09:48

## 2021-12-29 RX ADMIN — PROPOFOL 50 MG: 10 INJECTION, EMULSION INTRAVENOUS at 11:02

## 2021-12-29 RX ADMIN — PROPOFOL 50 MG: 10 INJECTION, EMULSION INTRAVENOUS at 11:17

## 2021-12-29 RX ADMIN — PROPOFOL 100 MG: 10 INJECTION, EMULSION INTRAVENOUS at 10:56

## 2021-12-29 NOTE — ANESTHESIOLOGIST PRE-PROCEDURE ATTESTATION
Pre-Procedure Patient Identification:  I am the Primary Anesthesiologist and have identified the patient on 12/29/21 at 10:48 AM.   I have confirmed the procedure(s) will be performed by the following surgeon/proceduralist Tiarra Martinez MD.

## 2021-12-29 NOTE — ANESTHESIOLOGIST PRE-PROCEDURE ATTESTATION
Pre-Procedure Patient Identification:  I am the Primary Anesthesiologist and have identified the patient on 12/29/21 at 10:49 AM.   I have confirmed the procedure(s) will be performed by the following surgeon/proceduralist Tiarra Martinez MD.

## 2021-12-29 NOTE — DISCHARGE INSTRUCTIONS
Colonoscopy, Adult, Care After  This sheet gives you information about how to care for yourself after your procedure. Your doctor may also give you more specific instructions. If you have problems or questions, call your doctor.  What can I expect after the procedure?  After the procedure, it is common to have:  · A small amount of blood in your poop (stool) for 24 hours.  · Some gas.  · Mild cramping or bloating in your belly (abdomen).  Follow these instructions at home:  Eating and drinking    · Drink enough fluid to keep your pee (urine) pale yellow.  · Follow instructions from your doctor about what you cannot eat or drink.  · Return to your normal diet as told by your doctor. Avoid heavy or fried foods that are hard to digest.  Activity  · Rest as told by your doctor.  · Do not sit for a long time without moving. Get up to take short walks every 1-2 hours. This is important. Ask for help if you feel weak or unsteady.  · Return to your normal activities as told by your doctor. Ask your doctor what activities are safe for you.  To help cramping and bloating:    · Try walking around.  · Put heat on your belly as told by your doctor. Use the heat source that your doctor recommends, such as a moist heat pack or a heating pad.  ? Put a towel between your skin and the heat source.  ? Leave the heat on for 20-30 minutes.  ? Remove the heat if your skin turns bright red. This is very important if you are unable to feel pain, heat, or cold. You may have a greater risk of getting burned.  General instructions  · If you were given a medicine to help you relax (sedative) during your procedure, it can affect you for many hours. Do not drive or use machinery until your doctor says that it is safe.  · For the first 24 hours after the procedure:  ? Do not sign important documents.  ? Do not drink alcohol.  ? Do your daily activities more slowly than normal.  ? Eat foods that are soft and easy to digest.  · Take  over-the-counter or prescription medicines only as told by your doctor.  · Keep all follow-up visits as told by your doctor. This is important.  Contact a doctor if:  · You have blood in your poop 2-3 days after the procedure.  Get help right away if:  · You have more than a small amount of blood in your poop.  · You see large clumps of tissue (blood clots) in your poop.  · Your belly is swollen.  · You feel like you may vomit (nauseous).  · You vomit.  · You have a fever.  · You have belly pain that gets worse, and medicine does not help your pain.  Summary  · After the procedure, it is common to have a small amount of blood in your poop. You may also have mild cramping and bloating in your belly.  · If you were given a medicine to help you relax (sedative) during your procedure, it can affect you for many hours. Do not drive or use machinery until your doctor says that it is safe.  · Get help right away if you have a lot of blood in your poop, feel like you may vomit, have a fever, or have more belly pain.  This information is not intended to replace advice given to you by your health care provider. Make sure you discuss any questions you have with your health care provider.  Document Revised: 10/23/2020 Document Reviewed: 07/13/2020  Elsevier Patient Education © 2021 [employer]

## 2021-12-29 NOTE — ANESTHESIA POSTPROCEDURE EVALUATION
Patient: Lake Martin    Procedure Summary     Date: 12/29/21 Room / Location:  OR 12 / [facility]    Anesthesia Start: 1052 Anesthesia Stop: 1127    Procedure: COLONOSCOPY, HOT SNARE POLYPECTOMY, TATOOING (N/A ) Diagnosis:       Rectal mass      (Rectal mass [K62.89])    Providers: Tiarra Martinez MD Responsible Provider: Edwar Muñoz MD    Anesthesia Type: MAC ASA Status: 3          Anesthesia Type: MAC  PACU Vitals  12/29/2021 1122 - 12/29/2021 1222      12/29/2021  1123 12/29/2021  1126 12/29/2021  1127 12/29/2021  1128    BP: 122/57 -- -- --    Temp: 36.4 °C (97.6 °F) -- -- --    Pulse: -- 64 71 67    Resp: -- 24 30 30    SpO2: -- 97 % 97 % 96 %              12/29/2021  1129 12/29/2021  1130 12/29/2021  1140 12/29/2021  1150    BP: -- 126/58 138/73 156/81    Temp: -- -- -- --    Pulse: 62 65 64 61    Resp: 27 37 44 34    SpO2: 97 % 96 % 98 % 98 %            Anesthesia Post Evaluation    Pain management: adequate  Patient participation: complete - patient participated  Level of consciousness: awake and alert  Cardiovascular status: acceptable  Airway Patency: adequate  Respiratory status: acceptable  Hydration status: acceptable  Anesthetic complications: no

## 2021-12-29 NOTE — OR SURGEON
Pre-Procedure patient identification:  I am the primary operating surgeon/proceduralist and I have identified the patient on 12/29/21 at 10:42 AM Tiarra Martinez MD  Phone Number: [phone]

## 2021-12-29 NOTE — OP NOTE
_______________________________________________________________________________  Patient Name: Lake Martin              Procedure Date: 12/29/2021 8:18 AM  MRN: [id number]                     Account Number: [id number]  YOB: 1965             Age: 56  Gender: Male                          Note Status: Finalized  Attending MD: TAMMY KLEIN MD  _______________________________________________________________________________  Procedure:             Colonoscopy  Indications:           Personal history of malignant rectal neoplasm  Providers:             TAMMY KLEIN MD (Doctor), Brittaney Hale (Nurse),  Gayathri Kirkland (Nurse)  Referring MD:          ARAVIND WALLACE MD  Requesting Provider:  Complications:         No immediate complications. Estimated blood loss: None.  _______________________________________________________________________________  Procedure:             After I obtained informed consent, the scope was  passed under direct vision. Throughout the procedure,  the patient's blood pressure, pulse, and oxygen  saturations were monitored continuously.The pediatric  colonoscope scope was introduced through the anus and  advanced to the cecum, identified by appendiceal  orifice and ileocecal valve. The ileocecal valve, the  appendiceal orifice and the rectum were photographed.  The colonoscopy was performed without difficulty. The  patient tolerated the procedure well. The quality of  the bowel preparation was good.  Estimated Blood Loss:  Estimated blood loss: none.  Findings:  The digital rectal exam revealed a 6 cm (diameter) rectal mass palpated  8.0 cm from the anal verge. The mass was non-circumferential and located  predominantly at the anterior bowel wall.  A non-obstructing mass was found in the mid rectum. The mass was  partially circumferential (involving one-third of the lumen  circumference). Area was tattooed with an injection of 3 mL of Mariposa ink.  A 5 mm polyp was found in  the descending colon. The polyp was removed  with a hot snare. Resection and retrieval were complete.  Impression:            - Rectal mass 8.0 cm from the anal verge.  - Malignant tumor in the mid rectum. Tattooed.  - One 5 mm polyp in the descending colon, removed with  a hot snare. Resected and retrieved.  Recommendation:        - Discharge patient to home (with escort).  - Clear liquid diet today.  - Return to OR tomorrow.  Procedure Code(s):     --- Professional ---  82748, Colonoscopy, flexible; with removal of  tumor(s), polyp(s), or other lesion(s) by snare  technique  46384, Colonoscopy, flexible; with directed submucosal  injection(s), any substance  Diagnosis Code(s):     --- Professional ---  K62.89, Other specified diseases of anus and rectum  C20, Malignant neoplasm of rectum  K63.5, Polyp of colon  Z85.048, Personal history of other malignant neoplasm  of rectum, rectosigmoid junction, and anus  CPT copyright 2020 [employer]. All rights reserved.  The codes documented in this report are preliminary and upon  review may  be revised to meet current compliance requirements.  Attending Participation:  I personally performed the entire procedure.  Tammy Klein MD  ___________________  TAMMY KLEIN MD  12/29/2021 11:29:12 AM  This report has been signed electronically.  Number of Addenda: 0  Note Initiated On: 12/29/2021 8:18 AM

## 2021-12-29 NOTE — ANESTHESIA PREPROCEDURE EVALUATION
Relevant Problems   CARDIOVASCULAR   (+) Primary hypertension      URINARY SYSTEM   (+) Kidney stone      Other   (+) Type 2 diabetes mellitus without complication, without long-term current use of insulin (CMS/HCC)       Anesthesia ROS/MED HX      Cardiovascular   hypertension  Endo/Other   Diabetes  Body Habitus: Obese       Past Surgical History:   Procedure Laterality Date   • COLONOSCOPY  2021       Physical Exam    Airway   Mallampati: II   TM distance: >3 FB   Neck ROM: full  Cardiovascular - normal   Rhythm: regular   Rate: normalPulmonary - normal   clear to auscultation  Dental - normal        Anesthesia Plan    Plan: MAC    Technique: MAC     Lines and Monitors: PIV   ASA 3  Blood Products:   Use of Blood Products Discussed: No   Anesthetic plan and risks discussed with: patient  Induction:    intravenous   Postop Plan:   Patient Disposition: phase II then home   Pain Management: IV analgesics

## 2021-12-30 ENCOUNTER — ANESTHESIA (OUTPATIENT)
Dept: OPERATING ROOM | Facility: HOSPITAL | Age: 56
Setting detail: SURGERY ADMIT
DRG: 330 | End: 2021-12-30
Payer: COMMERCIAL

## 2021-12-30 ENCOUNTER — HOSPITAL ENCOUNTER (INPATIENT)
Facility: HOSPITAL | Age: 56
LOS: 6 days | Discharge: HOME | DRG: 330 | End: 2022-01-05
Attending: SURGERY | Admitting: SURGERY
Payer: COMMERCIAL

## 2021-12-30 DIAGNOSIS — K62.89 RECTAL MASS: ICD-10-CM

## 2021-12-30 LAB
ABO + RH BLD: NORMAL
BLD GP AB SCN SERPL QL: NEGATIVE
CASE RPRT: NORMAL
CLINICAL INFO: NORMAL
D AG BLD QL: POSITIVE
GLUCOSE BLD-MCNC: 149 MG/DL (ref 70–99)
GLUCOSE BLD-MCNC: 208 MG/DL (ref 70–99)
GLUCOSE BLD-MCNC: 216 MG/DL (ref 70–99)
LABORATORY COMMENT REPORT: NORMAL
PATH REPORT.FINAL DX SPEC: NORMAL
PATH REPORT.GROSS SPEC: NORMAL
POCT TEST: ABNORMAL
SPECIMEN EXP DATE BLD: NORMAL

## 2021-12-30 PROCEDURE — 63600000 HC DRUGS/DETAIL CODE: Performed by: NURSE ANESTHETIST, CERTIFIED REGISTERED

## 2021-12-30 PROCEDURE — 36000006 HC OR LEVEL 6 INITIAL 30MIN: Performed by: SURGERY

## 2021-12-30 PROCEDURE — C1769 GUIDE WIRE: HCPCS | Performed by: SURGERY

## 2021-12-30 PROCEDURE — 44213 LAP MOBIL SPLENIC FL ADD-ON: CPT | Performed by: SURGERY

## 2021-12-30 PROCEDURE — 0DBP4ZZ EXCISION OF RECTUM, PERCUTANEOUS ENDOSCOPIC APPROACH: ICD-10-PCS | Performed by: SURGERY

## 2021-12-30 PROCEDURE — 25000000 HC PHARMACY GENERAL: Performed by: NURSE ANESTHETIST, CERTIFIED REGISTERED

## 2021-12-30 PROCEDURE — 99221 1ST HOSP IP/OBS SF/LOW 40: CPT | Performed by: STUDENT IN AN ORGANIZED HEALTH CARE EDUCATION/TRAINING PROGRAM

## 2021-12-30 PROCEDURE — 0T788DZ DILATION OF BILATERAL URETERS WITH INTRALUMINAL DEVICE, VIA NATURAL OR ARTIFICIAL OPENING ENDOSCOPIC: ICD-10-PCS | Performed by: UROLOGY

## 2021-12-30 PROCEDURE — 63600000 HC DRUGS/DETAIL CODE: Performed by: SURGERY

## 2021-12-30 PROCEDURE — 27800000 HC SUPPLY/IMPLANTS: Performed by: SURGERY

## 2021-12-30 PROCEDURE — 71000011 HC PACU PHASE 1 EA ADDL MIN: Performed by: SURGERY

## 2021-12-30 PROCEDURE — 8E0W4CZ ROBOTIC ASSISTED PROCEDURE OF TRUNK REGION, PERCUTANEOUS ENDOSCOPIC APPROACH: ICD-10-PCS | Performed by: SURGERY

## 2021-12-30 PROCEDURE — 0TJB8ZZ INSPECTION OF BLADDER, VIA NATURAL OR ARTIFICIAL OPENING ENDOSCOPIC: ICD-10-PCS | Performed by: UROLOGY

## 2021-12-30 PROCEDURE — 0DBM4ZZ EXCISION OF DESCENDING COLON, PERCUTANEOUS ENDOSCOPIC APPROACH: ICD-10-PCS | Performed by: SURGERY

## 2021-12-30 PROCEDURE — S2900 ROBOTIC SURGICAL SYSTEM: HCPCS | Performed by: SURGERY

## 2021-12-30 PROCEDURE — 86901 BLOOD TYPING SEROLOGIC RH(D): CPT

## 2021-12-30 PROCEDURE — 36000016 HC OR LEVEL 6 EA ADDL MIN: Performed by: SURGERY

## 2021-12-30 PROCEDURE — 3E1M38Z IRRIGATION OF PERITONEAL CAVITY USING IRRIGATING SUBSTANCE, PERCUTANEOUS APPROACH: ICD-10-PCS | Performed by: SURGERY

## 2021-12-30 PROCEDURE — 63700000 HC SELF-ADMINISTRABLE DRUG: Performed by: SURGERY

## 2021-12-30 PROCEDURE — 36415 COLL VENOUS BLD VENIPUNCTURE: CPT | Performed by: SURGERY

## 2021-12-30 PROCEDURE — 27200000 HC STERILE SUPPLY: Performed by: SURGERY

## 2021-12-30 PROCEDURE — 12000000 HC ROOM AND CARE MED/SURG

## 2021-12-30 PROCEDURE — 25000000 HC PHARMACY GENERAL: Performed by: SURGERY

## 2021-12-30 PROCEDURE — 25800000 HC PHARMACY IV SOLUTIONS: Performed by: NURSE ANESTHETIST, CERTIFIED REGISTERED

## 2021-12-30 PROCEDURE — 37000001 HC ANESTHESIA GENERAL: Performed by: SURGERY

## 2021-12-30 PROCEDURE — 88341 IMHCHEM/IMCYTCHM EA ADD ANTB: CPT | Performed by: SURGERY

## 2021-12-30 PROCEDURE — 44207 L COLECTOMY/COLOPROCTOSTOMY: CPT | Performed by: SURGERY

## 2021-12-30 PROCEDURE — 71000001 HC PACU PHASE 1 INITIAL 30MIN: Performed by: SURGERY

## 2021-12-30 PROCEDURE — 0DBU4ZZ EXCISION OF OMENTUM, PERCUTANEOUS ENDOSCOPIC APPROACH: ICD-10-PCS | Performed by: SURGERY

## 2021-12-30 PROCEDURE — 63700000 HC SELF-ADMINISTRABLE DRUG: Performed by: STUDENT IN AN ORGANIZED HEALTH CARE EDUCATION/TRAINING PROGRAM

## 2021-12-30 DEVICE — IMPLANTABLE DEVICE: Type: IMPLANTABLE DEVICE | Site: URETER | Status: FUNCTIONAL

## 2021-12-30 RX ORDER — SODIUM CHLORIDE, SODIUM LACTATE, POTASSIUM CHLORIDE, CALCIUM CHLORIDE 600; 310; 30; 20 MG/100ML; MG/100ML; MG/100ML; MG/100ML
80 INJECTION, SOLUTION INTRAVENOUS CONTINUOUS
Status: ACTIVE | OUTPATIENT
Start: 2021-12-30 | End: 2021-12-31

## 2021-12-30 RX ORDER — CELECOXIB 200 MG/1
200 CAPSULE ORAL ONCE
Status: COMPLETED | OUTPATIENT
Start: 2021-12-30 | End: 2021-12-30

## 2021-12-30 RX ORDER — ONDANSETRON HYDROCHLORIDE 2 MG/ML
4 INJECTION, SOLUTION INTRAVENOUS
Status: DISCONTINUED | OUTPATIENT
Start: 2021-12-30 | End: 2021-12-30 | Stop reason: HOSPADM

## 2021-12-30 RX ORDER — DEXTROSE 40 %
15-30 GEL (GRAM) ORAL AS NEEDED
Status: DISCONTINUED | OUTPATIENT
Start: 2021-12-30 | End: 2021-12-30

## 2021-12-30 RX ORDER — ACETAMINOPHEN 325 MG/1
650 TABLET ORAL EVERY 4 HOURS PRN
Status: DISCONTINUED | OUTPATIENT
Start: 2021-12-30 | End: 2022-01-05 | Stop reason: HOSPADM

## 2021-12-30 RX ORDER — EPHEDRINE SULFATE 50 MG/ML
INJECTION, SOLUTION INTRAVENOUS AS NEEDED
Status: DISCONTINUED | OUTPATIENT
Start: 2021-12-30 | End: 2021-12-30 | Stop reason: SURG

## 2021-12-30 RX ORDER — GABAPENTIN 300 MG/1
300 CAPSULE ORAL ONCE
Status: COMPLETED | OUTPATIENT
Start: 2021-12-30 | End: 2021-12-30

## 2021-12-30 RX ORDER — MIDAZOLAM HYDROCHLORIDE 2 MG/2ML
INJECTION, SOLUTION INTRAMUSCULAR; INTRAVENOUS AS NEEDED
Status: DISCONTINUED | OUTPATIENT
Start: 2021-12-30 | End: 2021-12-30 | Stop reason: SURG

## 2021-12-30 RX ORDER — BUPIVACAINE HYDROCHLORIDE AND EPINEPHRINE 5; 5 MG/ML; UG/ML
INJECTION, SOLUTION EPIDURAL; INTRACAUDAL; PERINEURAL
Status: DISCONTINUED | OUTPATIENT
Start: 2021-12-30 | End: 2021-12-30 | Stop reason: HOSPADM

## 2021-12-30 RX ORDER — METRONIDAZOLE 500 MG/100ML
500 INJECTION, SOLUTION INTRAVENOUS ONCE
Status: COMPLETED | OUTPATIENT
Start: 2021-12-30 | End: 2021-12-30

## 2021-12-30 RX ORDER — DOCUSATE SODIUM 100 MG/1
100 CAPSULE, LIQUID FILLED ORAL 2 TIMES DAILY
Status: DISCONTINUED | OUTPATIENT
Start: 2021-12-30 | End: 2022-01-05 | Stop reason: HOSPADM

## 2021-12-30 RX ORDER — GABAPENTIN 300 MG/1
300 CAPSULE ORAL 3 TIMES DAILY
Status: DISCONTINUED | OUTPATIENT
Start: 2021-12-30 | End: 2022-01-05 | Stop reason: HOSPADM

## 2021-12-30 RX ORDER — HEPARIN SODIUM 5000 [USP'U]/ML
5000 INJECTION, SOLUTION INTRAVENOUS; SUBCUTANEOUS EVERY 8 HOURS
Status: DISCONTINUED | OUTPATIENT
Start: 2021-12-31 | End: 2022-01-05 | Stop reason: HOSPADM

## 2021-12-30 RX ORDER — HYDROMORPHONE HYDROCHLORIDE 1 MG/ML
0.5 INJECTION, SOLUTION INTRAMUSCULAR; INTRAVENOUS; SUBCUTANEOUS
Status: DISCONTINUED | OUTPATIENT
Start: 2021-12-30 | End: 2021-12-30 | Stop reason: HOSPADM

## 2021-12-30 RX ORDER — KETAMINE HCL IN 0.9 % NACL 2 MG/ML
PLASTIC BAG, INJECTION (ML) INTRAVENOUS
Status: COMPLETED
Start: 2021-12-30 | End: 2021-12-30

## 2021-12-30 RX ORDER — ROCURONIUM BROMIDE 10 MG/ML
INJECTION, SOLUTION INTRAVENOUS AS NEEDED
Status: DISCONTINUED | OUTPATIENT
Start: 2021-12-30 | End: 2021-12-30 | Stop reason: SURG

## 2021-12-30 RX ORDER — HYDRALAZINE HYDROCHLORIDE 25 MG/1
25 TABLET, FILM COATED ORAL ONCE
Status: COMPLETED | OUTPATIENT
Start: 2021-12-30 | End: 2021-12-30

## 2021-12-30 RX ORDER — CALCIUM GLUCONATE 20 MG/ML
1 INJECTION, SOLUTION INTRAVENOUS EVERY 6 HOURS PRN
Status: DISCONTINUED | OUTPATIENT
Start: 2021-12-30 | End: 2022-01-05 | Stop reason: HOSPADM

## 2021-12-30 RX ORDER — SODIUM CHLORIDE, SODIUM LACTATE, POTASSIUM CHLORIDE, CALCIUM CHLORIDE 600; 310; 30; 20 MG/100ML; MG/100ML; MG/100ML; MG/100ML
INJECTION, SOLUTION INTRAVENOUS CONTINUOUS
Status: ACTIVE | OUTPATIENT
Start: 2021-12-30 | End: 2021-12-30

## 2021-12-30 RX ORDER — POTASSIUM CHLORIDE 20 MEQ/1
20 TABLET, EXTENDED RELEASE ORAL AS NEEDED
Status: DISCONTINUED | OUTPATIENT
Start: 2021-12-30 | End: 2022-01-05 | Stop reason: HOSPADM

## 2021-12-30 RX ORDER — INSULIN ASPART 100 [IU]/ML
6-10 INJECTION, SOLUTION INTRAVENOUS; SUBCUTANEOUS
Status: DISCONTINUED | OUTPATIENT
Start: 2021-12-31 | End: 2022-01-04

## 2021-12-30 RX ORDER — LIDOCAINE HYDROCHLORIDE ANHYDROUS AND DEXTROSE MONOHYDRATE .8; 5 G/100ML; G/100ML
INJECTION, SOLUTION INTRAVENOUS CONTINUOUS PRN
Status: DISCONTINUED | OUTPATIENT
Start: 2021-12-30 | End: 2021-12-30 | Stop reason: SURG

## 2021-12-30 RX ORDER — IBUPROFEN 200 MG
16-32 TABLET ORAL AS NEEDED
Status: DISCONTINUED | OUTPATIENT
Start: 2021-12-30 | End: 2022-01-05 | Stop reason: HOSPADM

## 2021-12-30 RX ORDER — PHENYLEPHRINE HYDROCHLORIDE 10 MG/ML
INJECTION INTRAVENOUS AS NEEDED
Status: DISCONTINUED | OUTPATIENT
Start: 2021-12-30 | End: 2021-12-30 | Stop reason: SURG

## 2021-12-30 RX ORDER — KETAMINE HYDROCHLORIDE 50 MG/ML
INJECTION, SOLUTION INTRAMUSCULAR; INTRAVENOUS AS NEEDED
Status: DISCONTINUED | OUTPATIENT
Start: 2021-12-30 | End: 2021-12-30 | Stop reason: SURG

## 2021-12-30 RX ORDER — ONDANSETRON 4 MG/1
4 TABLET, ORALLY DISINTEGRATING ORAL EVERY 8 HOURS PRN
Status: DISCONTINUED | OUTPATIENT
Start: 2021-12-30 | End: 2022-01-05 | Stop reason: HOSPADM

## 2021-12-30 RX ORDER — SODIUM CHLORIDE, SODIUM LACTATE, POTASSIUM CHLORIDE, CALCIUM CHLORIDE 600; 310; 30; 20 MG/100ML; MG/100ML; MG/100ML; MG/100ML
INJECTION, SOLUTION INTRAVENOUS CONTINUOUS
Status: DISCONTINUED | OUTPATIENT
Start: 2021-12-30 | End: 2022-01-01

## 2021-12-30 RX ORDER — POTASSIUM CHLORIDE 20 MEQ/1
40 TABLET, EXTENDED RELEASE ORAL AS NEEDED
Status: DISCONTINUED | OUTPATIENT
Start: 2021-12-30 | End: 2022-01-05 | Stop reason: HOSPADM

## 2021-12-30 RX ORDER — ACETAMINOPHEN 325 MG/1
975 TABLET ORAL ONCE
Status: COMPLETED | OUTPATIENT
Start: 2021-12-30 | End: 2021-12-30

## 2021-12-30 RX ORDER — FENTANYL CITRATE 50 UG/ML
50 INJECTION, SOLUTION INTRAMUSCULAR; INTRAVENOUS
Status: DISCONTINUED | OUTPATIENT
Start: 2021-12-30 | End: 2021-12-30 | Stop reason: HOSPADM

## 2021-12-30 RX ORDER — HYDROMORPHONE HYDROCHLORIDE 1 MG/ML
0.25 INJECTION, SOLUTION INTRAMUSCULAR; INTRAVENOUS; SUBCUTANEOUS EVERY 4 HOURS PRN
Status: DISCONTINUED | OUTPATIENT
Start: 2021-12-30 | End: 2022-01-05 | Stop reason: HOSPADM

## 2021-12-30 RX ORDER — IBUPROFEN 200 MG
16-32 TABLET ORAL AS NEEDED
Status: DISCONTINUED | OUTPATIENT
Start: 2021-12-30 | End: 2021-12-30

## 2021-12-30 RX ORDER — DIPHENHYDRAMINE HCL 50 MG/ML
25 VIAL (ML) INJECTION EVERY 6 HOURS PRN
Status: DISCONTINUED | OUTPATIENT
Start: 2021-12-30 | End: 2022-01-05 | Stop reason: HOSPADM

## 2021-12-30 RX ORDER — ONDANSETRON HYDROCHLORIDE 2 MG/ML
INJECTION, SOLUTION INTRAVENOUS AS NEEDED
Status: DISCONTINUED | OUTPATIENT
Start: 2021-12-30 | End: 2021-12-30 | Stop reason: SURG

## 2021-12-30 RX ORDER — OXYCODONE HYDROCHLORIDE 5 MG/1
5 TABLET ORAL EVERY 4 HOURS PRN
Status: DISCONTINUED | OUTPATIENT
Start: 2021-12-30 | End: 2022-01-05 | Stop reason: HOSPADM

## 2021-12-30 RX ORDER — PROPOFOL 10 MG/ML
INJECTION, EMULSION INTRAVENOUS AS NEEDED
Status: DISCONTINUED | OUTPATIENT
Start: 2021-12-30 | End: 2021-12-30 | Stop reason: SURG

## 2021-12-30 RX ORDER — DEXAMETHASONE SODIUM PHOSPHATE 4 MG/ML
INJECTION, SOLUTION INTRA-ARTICULAR; INTRALESIONAL; INTRAMUSCULAR; INTRAVENOUS; SOFT TISSUE AS NEEDED
Status: DISCONTINUED | OUTPATIENT
Start: 2021-12-30 | End: 2021-12-30 | Stop reason: SURG

## 2021-12-30 RX ORDER — LIDOCAINE HYDROCHLORIDE ANHYDROUS AND DEXTROSE MONOHYDRATE .8; 5 G/100ML; G/100ML
INJECTION, SOLUTION INTRAVENOUS
Status: COMPLETED
Start: 2021-12-30 | End: 2021-12-30

## 2021-12-30 RX ORDER — SODIUM CHLORIDE 9 MG/ML
INJECTION, SOLUTION INTRAVENOUS CONTINUOUS PRN
Status: DISCONTINUED | OUTPATIENT
Start: 2021-12-30 | End: 2021-12-30 | Stop reason: SURG

## 2021-12-30 RX ORDER — DEXTROSE 40 %
15-30 GEL (GRAM) ORAL AS NEEDED
Status: DISCONTINUED | OUTPATIENT
Start: 2021-12-30 | End: 2022-01-05 | Stop reason: HOSPADM

## 2021-12-30 RX ORDER — LANOLIN ALCOHOL/MO/W.PET/CERES
400 CREAM (GRAM) TOPICAL 2 TIMES DAILY PRN
Status: DISCONTINUED | OUTPATIENT
Start: 2021-12-30 | End: 2022-01-05 | Stop reason: HOSPADM

## 2021-12-30 RX ORDER — FENTANYL CITRATE 50 UG/ML
INJECTION, SOLUTION INTRAMUSCULAR; INTRAVENOUS AS NEEDED
Status: DISCONTINUED | OUTPATIENT
Start: 2021-12-30 | End: 2021-12-30 | Stop reason: SURG

## 2021-12-30 RX ORDER — DEXTROSE 50 % IN WATER (D50W) INTRAVENOUS SYRINGE
25 AS NEEDED
Status: DISCONTINUED | OUTPATIENT
Start: 2021-12-30 | End: 2022-01-05 | Stop reason: HOSPADM

## 2021-12-30 RX ORDER — DEXTROSE 50 % IN WATER (D50W) INTRAVENOUS SYRINGE
25 AS NEEDED
Status: DISCONTINUED | OUTPATIENT
Start: 2021-12-30 | End: 2021-12-30

## 2021-12-30 RX ORDER — KETAMINE HCL IN 0.9 % NACL 2 MG/ML
PLASTIC BAG, INJECTION (ML) INTRAVENOUS CONTINUOUS PRN
Status: DISCONTINUED | OUTPATIENT
Start: 2021-12-30 | End: 2021-12-30 | Stop reason: SURG

## 2021-12-30 RX ORDER — ALUMINUM HYDROXIDE, MAGNESIUM HYDROXIDE, AND SIMETHICONE 1200; 120; 1200 MG/30ML; MG/30ML; MG/30ML
30 SUSPENSION ORAL EVERY 4 HOURS PRN
Status: DISCONTINUED | OUTPATIENT
Start: 2021-12-30 | End: 2022-01-05 | Stop reason: HOSPADM

## 2021-12-30 RX ORDER — LIDOCAINE HCL/PF 100 MG/5ML
SYRINGE (ML) INTRAVENOUS AS NEEDED
Status: DISCONTINUED | OUTPATIENT
Start: 2021-12-30 | End: 2021-12-30 | Stop reason: SURG

## 2021-12-30 RX ADMIN — SODIUM CHLORIDE, POTASSIUM CHLORIDE, SODIUM LACTATE AND CALCIUM CHLORIDE 80 ML/HR: 600; 310; 30; 20 INJECTION, SOLUTION INTRAVENOUS at 18:29

## 2021-12-30 RX ADMIN — EPHEDRINE SULFATE 10 MG: 50 INJECTION, SOLUTION INTRAVENOUS at 08:38

## 2021-12-30 RX ADMIN — DEXAMETHASONE SODIUM PHOSPHATE 4 MG: 4 INJECTION, SOLUTION INTRA-ARTICULAR; INTRALESIONAL; INTRAMUSCULAR; INTRAVENOUS; SOFT TISSUE at 08:00

## 2021-12-30 RX ADMIN — SODIUM CHLORIDE, POTASSIUM CHLORIDE, SODIUM LACTATE AND CALCIUM CHLORIDE: 600; 310; 30; 20 INJECTION, SOLUTION INTRAVENOUS at 06:58

## 2021-12-30 RX ADMIN — PHENYLEPHRINE HYDROCHLORIDE 100 MCG: 10 INJECTION INTRAVENOUS at 11:49

## 2021-12-30 RX ADMIN — PHENYLEPHRINE HYDROCHLORIDE 100 MCG: 10 INJECTION INTRAVENOUS at 14:22

## 2021-12-30 RX ADMIN — ROCURONIUM BROMIDE 30 MG: 10 INJECTION, SOLUTION INTRAVENOUS at 09:30

## 2021-12-30 RX ADMIN — ROCURONIUM BROMIDE 20 MG: 10 INJECTION, SOLUTION INTRAVENOUS at 11:39

## 2021-12-30 RX ADMIN — FENTANYL CITRATE 50 MCG: 50 INJECTION, SOLUTION INTRAMUSCULAR; INTRAVENOUS at 07:45

## 2021-12-30 RX ADMIN — ROCURONIUM BROMIDE 20 MG: 10 INJECTION, SOLUTION INTRAVENOUS at 10:42

## 2021-12-30 RX ADMIN — ROCURONIUM BROMIDE 50 MG: 10 INJECTION, SOLUTION INTRAVENOUS at 07:49

## 2021-12-30 RX ADMIN — ROCURONIUM BROMIDE 30 MG: 10 INJECTION, SOLUTION INTRAVENOUS at 12:17

## 2021-12-30 RX ADMIN — METRONIDAZOLE 500 MG: 500 INJECTION, SOLUTION INTRAVENOUS at 15:30

## 2021-12-30 RX ADMIN — EPHEDRINE SULFATE 10 MG: 50 INJECTION, SOLUTION INTRAVENOUS at 08:02

## 2021-12-30 RX ADMIN — CEFAZOLIN 1 G: 330 INJECTION, POWDER, FOR SOLUTION INTRAMUSCULAR; INTRAVENOUS at 08:59

## 2021-12-30 RX ADMIN — PHENYLEPHRINE HYDROCHLORIDE 200 MCG: 10 INJECTION INTRAVENOUS at 14:00

## 2021-12-30 RX ADMIN — PROPOFOL INJECTABLE EMULSION 200 MG: 10 INJECTION, EMULSION INTRAVENOUS at 07:49

## 2021-12-30 RX ADMIN — LOSARTAN POTASSIUM: 50 TABLET, FILM COATED ORAL at 19:08

## 2021-12-30 RX ADMIN — ROCURONIUM BROMIDE 10 MG: 10 INJECTION, SOLUTION INTRAVENOUS at 15:12

## 2021-12-30 RX ADMIN — CEFAZOLIN 1 G: 330 INJECTION, POWDER, FOR SOLUTION INTRAMUSCULAR; INTRAVENOUS at 13:02

## 2021-12-30 RX ADMIN — PHENYLEPHRINE HYDROCHLORIDE 100 MCG: 10 INJECTION INTRAVENOUS at 14:26

## 2021-12-30 RX ADMIN — ROCURONIUM BROMIDE 20 MG: 10 INJECTION, SOLUTION INTRAVENOUS at 13:54

## 2021-12-30 RX ADMIN — KETAMINE HYDROCHLORIDE 60 MG: 50 INJECTION, SOLUTION INTRAMUSCULAR; INTRAVENOUS at 07:48

## 2021-12-30 RX ADMIN — FENTANYL CITRATE 50 MCG: 50 INJECTION, SOLUTION INTRAMUSCULAR; INTRAVENOUS at 09:24

## 2021-12-30 RX ADMIN — CELECOXIB 200 MG: 200 CAPSULE ORAL at 06:14

## 2021-12-30 RX ADMIN — LIDOCAINE HYDROCHLORIDE 100 MG: 20 INJECTION, SOLUTION INTRAVENOUS at 07:47

## 2021-12-30 RX ADMIN — ROCURONIUM BROMIDE 20 MG: 10 INJECTION, SOLUTION INTRAVENOUS at 14:42

## 2021-12-30 RX ADMIN — PHENYLEPHRINE HYDROCHLORIDE 100 MCG: 10 INJECTION INTRAVENOUS at 14:15

## 2021-12-30 RX ADMIN — PROPOFOL INJECTABLE EMULSION 50 MG: 10 INJECTION, EMULSION INTRAVENOUS at 09:28

## 2021-12-30 RX ADMIN — ROCURONIUM BROMIDE 20 MG: 10 INJECTION, SOLUTION INTRAVENOUS at 08:59

## 2021-12-30 RX ADMIN — ROCURONIUM BROMIDE 20 MG: 10 INJECTION, SOLUTION INTRAVENOUS at 10:28

## 2021-12-30 RX ADMIN — ROCURONIUM BROMIDE 20 MG: 10 INJECTION, SOLUTION INTRAVENOUS at 13:25

## 2021-12-30 RX ADMIN — Medication 0.2 MG/KG/HR: at 07:48

## 2021-12-30 RX ADMIN — METRONIDAZOLE 500 MG: 500 INJECTION, SOLUTION INTRAVENOUS at 07:30

## 2021-12-30 RX ADMIN — EPHEDRINE SULFATE 10 MG: 50 INJECTION, SOLUTION INTRAVENOUS at 08:35

## 2021-12-30 RX ADMIN — ROCURONIUM BROMIDE 10 MG: 10 INJECTION, SOLUTION INTRAVENOUS at 10:00

## 2021-12-30 RX ADMIN — GABAPENTIN 300 MG: 300 CAPSULE ORAL at 19:49

## 2021-12-30 RX ADMIN — ROCURONIUM BROMIDE 20 MG: 10 INJECTION, SOLUTION INTRAVENOUS at 09:15

## 2021-12-30 RX ADMIN — ONDANSETRON HYDROCHLORIDE 4 MG: 2 INJECTION, SOLUTION INTRAMUSCULAR; INTRAVENOUS at 15:16

## 2021-12-30 RX ADMIN — LIDOCAINE HYDROCHLORIDE ANHYDROUS AND DEXTROSE MONOHYDRATE 1.5 MG/MIN: .8; 5 INJECTION, SOLUTION INTRAVENOUS at 07:48

## 2021-12-30 RX ADMIN — ROCURONIUM BROMIDE 30 MG: 10 INJECTION, SOLUTION INTRAVENOUS at 08:30

## 2021-12-30 RX ADMIN — DOCUSATE SODIUM 100 MG: 100 CAPSULE ORAL at 19:49

## 2021-12-30 RX ADMIN — ROCURONIUM BROMIDE 10 MG: 10 INJECTION, SOLUTION INTRAVENOUS at 14:31

## 2021-12-30 RX ADMIN — CEFAZOLIN 3 G: 330 INJECTION, POWDER, FOR SOLUTION INTRAMUSCULAR; INTRAVENOUS at 07:55

## 2021-12-30 RX ADMIN — SUGAMMADEX 600 MG: 100 INJECTION, SOLUTION INTRAVENOUS at 15:40

## 2021-12-30 RX ADMIN — SODIUM CHLORIDE: 9 INJECTION, SOLUTION INTRAVENOUS at 07:48

## 2021-12-30 RX ADMIN — MIDAZOLAM HYDROCHLORIDE 2 MG: 1 INJECTION, SOLUTION INTRAMUSCULAR; INTRAVENOUS at 07:32

## 2021-12-30 RX ADMIN — EPHEDRINE SULFATE 10 MG: 50 INJECTION, SOLUTION INTRAVENOUS at 08:51

## 2021-12-30 RX ADMIN — EPHEDRINE SULFATE 10 MG: 50 INJECTION, SOLUTION INTRAVENOUS at 08:31

## 2021-12-30 RX ADMIN — GABAPENTIN 300 MG: 300 CAPSULE ORAL at 06:14

## 2021-12-30 RX ADMIN — ACETAMINOPHEN 975 MG: 325 TABLET ORAL at 06:14

## 2021-12-30 RX ADMIN — HYDRALAZINE HYDROCHLORIDE 25 MG: 25 TABLET, FILM COATED ORAL at 23:09

## 2021-12-30 RX ADMIN — PHENYLEPHRINE HYDROCHLORIDE 100 MCG: 10 INJECTION INTRAVENOUS at 14:30

## 2021-12-30 ASSESSMENT — ENCOUNTER SYMPTOMS: HEADACHES: 1

## 2021-12-30 ASSESSMENT — PATIENT HEALTH QUESTIONNAIRE - PHQ9: SUM OF ALL RESPONSES TO PHQ9 QUESTIONS 1 & 2: 0

## 2021-12-30 NOTE — OP NOTE
REPORT TYPE:  Operative Note     DATE OF OPERATION: 12/30/2021        PREOPERATIVE DIAGNOSIS: Low rectal cancer     POSTOPERATIVE DIAGNOSIS: Low rectal cancer     PROCEDURES:  Robotic low anterior resection   Robotic takedown of splenic flexure  Primary side to end colorectal anastomosis with EEA #28  Placement of ADITYA drain  Peritoneal lavage     SURGEON:  iTarra Martinez MD     ASSISTANT: JASON De Souza     ANESTHESIA TYPE:  General anesthesia and local anesthesia.     ESTIMATED BLOOD LOSS:  50 mL     COMPLICATIONS:  None.     DRAINS AND TUBES: Urb[place], 15F Chapin drain x1     INDICATIONS FOR THE PROCEDURE:  is a 56-year-old gentelam who was diagnosed with a rectal mass on a recent colonoscopy with GI.  He underwent metastatic work-up that was largely negative.  He did have a preoperative staging MRI which states this at a T2 rectal cancer.  Preoperative CEA was 1.6..He received preoperative clearances from appropriate physicians and now presents for a resection.  We discussed details of the surgery including risk, benefits, complications including bleeding, infection, anastomotic leak, ureteral injury, hernia, small bowel obstruction/injury, need for ostomy. He showed good understanding of our discussion and agrees to proceed.        PROCEDURE DETAILS:     After correct identification, the patient was laid in the supine position and general anesthesia was induced.  SCDs were on and functional and perioperative antibiotics were given.  A timeout was called.  Urology then performed cystoscopy and instilled ICG in both ureteral orifices.  The patient was then placed in lithotomy position.  The patient's arms were tucked safely in the lithotomy position, and adequate positioning was reassured.  The abdomen was prepped and draped in the usual sterile fashion.  A second timeout was called.  A Verres needle insufflation was carried out in the right upper quadrant after confirming appropriate placement of the  needle.  Beyond this, the abdomen was entered using a 5 mm trocar using a visaport entry using a 5-0 camera.  Diagnostic laparoscopy was carried out.  An 8 mm trocar was placed just to the right of the umbilicus.  Two further 8 mm trocars were placed a handsbreadth distance away in the midclavicular line and the anterior axillary line in the left abdomen. Another 12 mm trocar was placed over the right mid abdomen under direct visualization.        The patient was then positioned in deep Trendelenburg and right lateral position to remove the small bowel out of the pelvis and at this point, the robot was docked with camera placed at the umbilical port.  An initial  lysis of adhesions was carried out given that there were several adhesions between the omentum and the anterior abdominal wall.  There were also adhesions between the omentum and the tattooed left colonic region.  These adhesions were taken down carefully and a partial omentectomy was performed ensuring hemostasis and the specimen was passed off the specimen.     Using the robotic forced bipolar grasper and the robotic vessel sealer device, the sigmoid colon was put on stretch.  The inferior mesenteric artery pedicle was put on stretch and dissection was carried out in the avascular retroperitoneal plane between the mesentery of the sigmoid colon anteriorly and the left ureter and gonadal vessels posteriorly.  The left ureter was carefully identified and preserved.  The dissection was carried out laterally all the way up to the line of Toldt and superiorly above the medial retroperitoneum completing a medial to lateral dissection.  A window was created and the inferior mesenteric artery was isolated and divided using the robotic vessel sealer device.  There was good hemostasis.   Dissection was carried out superiorly underneath the splenic flexure and the inferior border of the pancreas.  A decision was made to perform a descending colectomy as well since  this also contained some of the diverticular disease.  The transverse colon was mobilized so that it could reach down to the pelvis.  The gastrocolic ligament was divided to enter the lesser sac.  Safety of the spleen and pancreas was ensured.  Beyond this, we turned our attention to the white line of Toldt, which was divided and the medial dissection was met.  The sigmoid colon was carefully mobilized of the left lateral pelvic sidewall ensuring safety of the left ureter and vessels.      The splenic flexure was then taken down by incising the gastrocolic ligament and entering the lesser sac.  Safety of the spleen and pancreas was ensured.     Once the colon had been adequately mobilized, we returned our attention to the pelvis.   The sigmoid colon was straightened out and the dissection was carried down into the rectum by entering the retrorectal holy plane by pushing the mesorectum anteriorly.  This helped straighten the rectum. The peritoneum along the lateral stalks of the rectum was incised.  The rectal prostatic and rectal sacral fascia were then incised and the dissection was carried down anteriorly behind the prostate and seminal vesicles and posteriorly down to the pelvic floor.  This was circumferentially cleared of attachments maintaining the intactness of the mesorectum to a point that we were well below the tattooed area.  A rectal exam was performed and this confirmed clearance from the tumor.        After the rectum had been straightened out, we selected a site over the low portion of the rectum as our point of transection.  The mesorectum here was divided posteriorly with the robotic vessel sealer and once the rectal wall had been cleared, a robotic Endo-GABY 60mm blue load stapler was utilized to fire across the rectum.  The specimen was disconnected from the rectum.  There was good hemostasis within the pelvis.  A complete mobilization was ensured.  The tumor was below the peritoneal reflection and  a complete total mesorectal excision was performed.           At this point, the abdomen was desufflated and the robot was undocked.  A 5 cm Pfannenstiel incision was made taking the incision to the skin subcutaneous tissue, transverse incision was made over the anterior rectus sheath raising flaps.  The rectus muscle was  along their fibers.  The peritoneum was grasped in between 2 hemostats and the peritoneal cavity was entered sharply using Metzenbaum scissors. A small Shawn disk was placed.       The colonic specimen which included the rectum and sigmoid colon was delivered through the Shawn disk.  We selected a good spot over the proximal descending colon.  The mesentery here was controlled using a ligasure device.   After dividing the colon, the specimen was passed off to be sent to the lab.  The descending colon was then sized using EEA sizers and a  #28 and will anvil was secured in place.  At this point, the source colon along with the anvil was reintroduced in the abdomen.          The abdomen was then again reinsufflated and then laparoscopically, we performed a side-to-end anastomosis between the descending colon and the EEA stapler introduced through the rectum.   The anastomosis was checked with a water leak test and an air leak test and was found to be intact. The anastomotic donuts were then inspected and was also found to be intact and passed off for analysis.The mesentery was not spun and there were no internal hernias.       Beyond this the 12 mm trocar in the right lower quadrant was discontinued and the fascia here was closed laparoscopically with the aid of a Dar Sewell device.  A 15 English Chapin drain was then introduced through 1 of the port sites and placed in the pelvis and secured at the skin using a 2-0 nylon suture.     The abdomen was then desufflated. All the trocars were discontinued.  A thorough washout of the abdomen was carried out using 4 L of warm normal saline  and 4 L of warm sterile water.  The returning fluid was clear. The Shawn disk was removed. Gowns, gloves and instrumentation were changed to go to a clean table for abdominal closure. The abdominal wall was closed in layers with 3-O vicryl running suture for the peritoneum and a diminutive posterior rectus sheath, the rectus muscle was loosely approximated with a couple simple 3-O vicryl interrupted sutures and finally the anterior rectus sheath was closed transversely with a running #1 PDS stitch.  The skin for all the incisions was closed with 4-0 Monocryl stitches and clean dressings were applied.  0.5% Marcaine with epinephrine was injected for local anesthesia.  The patient tolerated the procedure well and was transferred to the PACU in a stable condition. Sponge and needle counts were correct x2.        TAMMY KLEIN MD

## 2021-12-30 NOTE — ANESTHESIOLOGIST PRE-PROCEDURE ATTESTATION
Pre-Procedure Patient Identification:  I am the Primary Anesthesiologist and have identified the patient on 12/30/21 at 7:07 AM.   I have confirmed the procedure(s) will be performed by the following surgeon/proceduralist Tiarra Martinez MD.

## 2021-12-30 NOTE — OR SURGEON
Pre-Procedure patient identification:  I am the primary operating surgeon/proceduralist and I have identified the patient on 12/30/21 at 7:30 AM Tiarra Martinez MD  Phone Number: [phone]

## 2021-12-30 NOTE — ANESTHESIA PREPROCEDURE EVALUATION
Relevant Problems   CARDIOVASCULAR   (+) Primary hypertension      URINARY SYSTEM   (+) Kidney stone      Other   (+) Type 2 diabetes mellitus without complication, without long-term current use of insulin (CMS/HCC)       Anesthesia ROS/MED HX    Anesthesia History - neg  Pulmonary - neg  Neuro/Psych    Headaches  Cardiovascular   hypertension   Covid19 Test Reviewed and ECG reviewed  Hematological - neg  GI/Hepatic- neg  Musculoskeletal- neg  Renal Disease- neg  Endo/Other   Diabetes  History of cancer and colon cancer  Body Habitus: Obese       Past Surgical History:   Procedure Laterality Date   • COLONOSCOPY  2021       Physical Exam    Airway   Mallampati: II   TM distance: >3 FB   Neck ROM: full  Cardiovascular - normal   Rhythm: regular   Rate: normalPulmonary - normal   clear to auscultation  Dental - normal        Anesthesia Plan    Plan: general    Technique: general endotracheal     Airway: oral intubation   ASA 3  Anesthetic plan and risks discussed with: patient  Induction:    intravenous   Postop Plan:   Patient Disposition: inpatient floor planned admission   Pain Management: IV analgesics

## 2021-12-30 NOTE — OP NOTE
"Preoperative diagnosis: Intraoperative ureteral identification    Postoperative diagnosis: Same    Procedure performed: Cystoscopy, placement of bilateral illuminated retrocatheters    Primary Surgeon: Nat Payne MD     Anesthesia: General    Estimated blood loss: Minimal    Drains left behind: Bilateral illuminated retrocatheters (Black on Left), 18Fr krause catheter     Specimen submitted: None           The patient was appropriately identified in the preoperative holding area and informed consents were reviewed.  The patient was then taken back to the operating room where they were induced under sufficient anesthesia then prepped and draped in the usual sterile fashion in the supine dorsolithotomy position.  A timeout was performed and the procedure was started     The patient's urethra was entered with a 22 Turkmen rigid cystoscope with a 30 degree lens and pan urethrocystoscopy was performed . The bladder was healthy-appearing without lesions/masses.  Ureteral orifices were noted to be in their orthotopic position bilaterally.  Attention was brought to the right ureteral orifice which was easily intubated the 0.035 Glidewire over which a 5 Turkmen open-ended catheter was advanced 25 cm up the right ureter.  Attention was then brought to the left ureteral orifice where the same procedure was performed.  The scope was then removed and a 16 Turkmen Krause catheter was easily advanced into the urinary bladder with return of clear irrigation.  The lighted ureteral stent inserts were then easily advanced up the 5 Turkmen open-ended catheters and secured to the Krause catheter with 2\" silk tape.     At this point the patient was turned over to colorectal surgery for them to perform their scheduled procedure.    Nat Payne MD  [facility]  [phone]                   "

## 2021-12-30 NOTE — ANESTHESIA PROCEDURE NOTES
Airway  Urgency: elective    Start Time: 12/30/2021 7:51 AM  Airway not difficult    General Information and Staff    Patient location during procedure: OR  Anesthesiologist: Israel Ryan MD  Resident/CRNA: Mari Milton CRNA  Performed: resident/CRNA     Indications and Patient Condition  Indications for airway management: anesthesia  Sedation level: general  Preoxygenated: yes  Patient position: sniffing  Mask difficulty assessment: 1 - vent by mask    Final Airway Details  Final airway type: endotracheal airway      Successful airway: ETT    Successful intubation technique: video laryngoscopy  Facilitating devices/methods: intubating stylet  Endotracheal tube insertion site: oral  Blade: Mary  Blade size: #4  ETT size (mm): 8.0  Placement verified by: chest auscultation and capnometry   Measured from: lips  Number of attempts at approach: 1  Number of other approaches attempted: 0  Atraumatic airway insertion

## 2021-12-30 NOTE — BRIEF OP NOTE
LOW ANTERIOR RESECTION LAPAROSCOPIC ROBOTIC Robotic Takedown Splenic Flexure, Primary Colorectal anastimosis EEA # 28 Placement of ADITYA drain and Peritoneal Lavage Procedure Note    Procedure:    LOW ANTERIOR RESECTION LAPAROSCOPIC ROBOTIC Robotic Takedown Splenic Flexure, Primary Colorectal anastimosis EEA # 28 Placement of ADITYA drain and Peritoneal Lavage  CPT(R) Code:  54025 - CO LAP,SURG,COLECTOMY,W/ANAST    Procedure:    cystoscopy placement rukhsana ureteral catheters  CPT(R) Code:  25096 - CO CYSTOURETHROSCOPY,URETER CATHETER      Pre-op Diagnosis     * Rectal mass [K62.89]       Post-op Diagnosis     * Rectal mass [K62.89]    Surgeon(s) and Role:  Panel 1:     * Tiarra Martinez MD - Primary  Panel 2:     * Nat Payne MD - Primary    Anesthesia: General    Staff:   Circulator: Elvira De La O RN; Srikanth Pozo RN; Justyna Cano RN; Anamaria Ruiz RN; Radha Cotto RN  Physician Assistant: MATT Pendleton PA C  Scrub Person: Cindi Montenegro; Allison Alexandra  Registered Nurse First Assistant: Pema Govea RN    Procedure Details   See op note    Estimated Blood Loss: 100 mL    Specimens:                Order Name Source Comment Collection Info Order Time   TYPE AND SCREEN Blood, Venous  Collected By: Ning Hill RN 12/30/2021  5:52 AM     Are you aware of this patient having previously identified antibodies?   NO          Does this Patient have Sickle Cell Disease/Sickle Cell Anemia?   NO          Release to patient   Immediate        PATHOLOGY TISSUE EXAM Large Intestine, Rectum Pre-op diagnosis:  Rectal mass [K62.89] Collected By: Tiarra Martinez MD 12/30/2021  2:28 PM     Release to patient   Immediate              Drains:   Drain 1 Lateral LLQ 15 Fr. (Active)   Site Description Clean & Dry 12/30/21 1615   Dressing Status clean & dry;dressing intact 12/30/21 1615   Status Compressed 12/30/21 1615       Urethral Catheter Double-lumen 18 Fr (Active)   Reason for Continuing  Urinary Catheterization Recent urologic surgery, bladder injury, pelvic surgery (i.e. GYN, colorectal) 12/30/21 1615   Urine Characteristics yellow 12/30/21 1615   Securement Method Leg strap 12/30/21 1615       Urethral Catheter Double-lumen Other (Comment) (Active)       Implants:   Implant Name Type Inv. Item Serial No.  Lot No. LRB No. Used Action   STENT URETERAL ILLUM IRIS KIT - [id number] Urological stents STENT URETERAL ILLUM IRIS KIT  [employer] [id number] N/A 1 Implanted              Complications:  None; patient tolerated the procedure well.           Disposition: PACU - hemodynamically stable.           Condition: stable    Tiarra Martinez MD  Phone Number: [phone]

## 2021-12-30 NOTE — OR SURGEON
Pre-Procedure patient identification:  I am the primary operating surgeon/proceduralist and I have identified the patient on 12/30/21 at 7:26 AM   Nat Payne MD  [facility]  [phone]

## 2021-12-31 PROBLEM — R20.0 RIGHT ARM NUMBNESS: Status: ACTIVE | Noted: 2021-12-31

## 2021-12-31 LAB
ANION GAP SERPL CALC-SCNC: 9 MEQ/L (ref 3–15)
BUN SERPL-MCNC: 11 MG/DL (ref 8–20)
CALCIUM SERPL-MCNC: 8.4 MG/DL (ref 8.9–10.3)
CHLORIDE SERPL-SCNC: 102 MEQ/L (ref 98–109)
CO2 SERPL-SCNC: 26 MEQ/L (ref 22–32)
CREAT SERPL-MCNC: 0.9 MG/DL (ref 0.8–1.3)
ERYTHROCYTE [DISTWIDTH] IN BLOOD BY AUTOMATED COUNT: 13.1 % (ref 11.6–14.4)
GFR SERPL CREATININE-BSD FRML MDRD: >60 ML/MIN/1.73M*2
GLUCOSE BLD-MCNC: 138 MG/DL (ref 70–99)
GLUCOSE BLD-MCNC: 140 MG/DL (ref 70–99)
GLUCOSE BLD-MCNC: 157 MG/DL (ref 70–99)
GLUCOSE SERPL-MCNC: 147 MG/DL (ref 70–99)
HCT VFR BLDCO AUTO: 39.2 % (ref 40.1–51)
HGB BLD-MCNC: 13.1 G/DL (ref 13.7–17.5)
MAGNESIUM SERPL-MCNC: 1.8 MG/DL (ref 1.8–2.5)
MCH RBC QN AUTO: 30.8 PG (ref 28–33.2)
MCHC RBC AUTO-ENTMCNC: 33.4 G/DL (ref 32.2–36.5)
MCV RBC AUTO: 92.2 FL (ref 83–98)
PDW BLD AUTO: 11.1 FL (ref 9.4–12.4)
PLATELET # BLD AUTO: 184 K/UL (ref 150–350)
POCT TEST: ABNORMAL
POTASSIUM SERPL-SCNC: 3 MEQ/L (ref 3.6–5.1)
RBC # BLD AUTO: 4.25 M/UL (ref 4.5–5.8)
SODIUM SERPL-SCNC: 137 MEQ/L (ref 136–144)
WBC # BLD AUTO: 11.27 K/UL (ref 3.8–10.5)

## 2021-12-31 PROCEDURE — 83735 ASSAY OF MAGNESIUM: CPT | Performed by: SURGERY

## 2021-12-31 PROCEDURE — 25800000 HC PHARMACY IV SOLUTIONS: Performed by: STUDENT IN AN ORGANIZED HEALTH CARE EDUCATION/TRAINING PROGRAM

## 2021-12-31 PROCEDURE — 36415 COLL VENOUS BLD VENIPUNCTURE: CPT | Performed by: SURGERY

## 2021-12-31 PROCEDURE — 97162 PT EVAL MOD COMPLEX 30 MIN: CPT | Mod: GP

## 2021-12-31 PROCEDURE — 12000000 HC ROOM AND CARE MED/SURG

## 2021-12-31 PROCEDURE — 63600000 HC DRUGS/DETAIL CODE: Performed by: SURGERY

## 2021-12-31 PROCEDURE — 97535 SELF CARE MNGMENT TRAINING: CPT | Mod: GO

## 2021-12-31 PROCEDURE — 80048 BASIC METABOLIC PNL TOTAL CA: CPT | Performed by: SURGERY

## 2021-12-31 PROCEDURE — 85027 COMPLETE CBC AUTOMATED: CPT | Performed by: SURGERY

## 2021-12-31 PROCEDURE — 97530 THERAPEUTIC ACTIVITIES: CPT | Mod: GP

## 2021-12-31 PROCEDURE — 97166 OT EVAL MOD COMPLEX 45 MIN: CPT | Mod: GO

## 2021-12-31 PROCEDURE — 63600000 HC DRUGS/DETAIL CODE: Performed by: STUDENT IN AN ORGANIZED HEALTH CARE EDUCATION/TRAINING PROGRAM

## 2021-12-31 PROCEDURE — 99024 POSTOP FOLLOW-UP VISIT: CPT | Performed by: SURGERY

## 2021-12-31 PROCEDURE — 63700000 HC SELF-ADMINISTRABLE DRUG: Performed by: SURGERY

## 2021-12-31 PROCEDURE — 99233 SBSQ HOSP IP/OBS HIGH 50: CPT | Performed by: STUDENT IN AN ORGANIZED HEALTH CARE EDUCATION/TRAINING PROGRAM

## 2021-12-31 RX ADMIN — DOCUSATE SODIUM 100 MG: 100 CAPSULE ORAL at 20:35

## 2021-12-31 RX ADMIN — HEPARIN SODIUM 5000 UNITS: 5000 INJECTION, SOLUTION INTRAVENOUS; SUBCUTANEOUS at 15:37

## 2021-12-31 RX ADMIN — HEPARIN SODIUM 5000 UNITS: 5000 INJECTION, SOLUTION INTRAVENOUS; SUBCUTANEOUS at 22:34

## 2021-12-31 RX ADMIN — HEPARIN SODIUM 5000 UNITS: 5000 INJECTION, SOLUTION INTRAVENOUS; SUBCUTANEOUS at 05:25

## 2021-12-31 RX ADMIN — POTASSIUM CHLORIDE 40 MEQ: 1500 TABLET, EXTENDED RELEASE ORAL at 06:31

## 2021-12-31 RX ADMIN — OXYCODONE HYDROCHLORIDE 5 MG: 5 TABLET ORAL at 10:02

## 2021-12-31 RX ADMIN — HYDRALAZINE HYDROCHLORIDE 10 MG: 20 INJECTION INTRAMUSCULAR; INTRAVENOUS at 22:33

## 2021-12-31 RX ADMIN — DOCUSATE SODIUM 100 MG: 100 CAPSULE ORAL at 08:33

## 2021-12-31 RX ADMIN — HYDRALAZINE HYDROCHLORIDE 10 MG: 20 INJECTION INTRAMUSCULAR; INTRAVENOUS at 11:34

## 2021-12-31 RX ADMIN — GABAPENTIN 300 MG: 300 CAPSULE ORAL at 15:37

## 2021-12-31 RX ADMIN — LOSARTAN POTASSIUM: 50 TABLET, FILM COATED ORAL at 08:33

## 2021-12-31 RX ADMIN — GABAPENTIN 300 MG: 300 CAPSULE ORAL at 20:35

## 2021-12-31 RX ADMIN — GABAPENTIN 300 MG: 300 CAPSULE ORAL at 08:33

## 2021-12-31 RX ADMIN — SODIUM CHLORIDE, POTASSIUM CHLORIDE, SODIUM LACTATE AND CALCIUM CHLORIDE 80 ML/HR: 600; 310; 30; 20 INJECTION, SOLUTION INTRAVENOUS at 15:44

## 2021-12-31 ASSESSMENT — COGNITIVE AND FUNCTIONAL STATUS - GENERAL
TOILETING: 4 - NONE
AFFECT: WFL;ANXIOUS
CLIMB 3 TO 5 STEPS WITH RAILING: 3 - A LITTLE
HELP NEEDED FOR BATHING: 3 - A LITTLE
STANDING UP FROM CHAIR USING ARMS: 3 - A LITTLE
EATING MEALS: 4 - NONE
WALKING IN HOSPITAL ROOM: 3 - A LITTLE
HELP NEEDED FOR PERSONAL GROOMING: 4 - NONE
MOVING TO AND FROM BED TO CHAIR: 3 - A LITTLE
DRESSING REGULAR LOWER BODY CLOTHING: 2 - A LOT
AFFECT: WNL;ANXIOUS
DRESSING REGULAR UPPER BODY CLOTHING: 4 - NONE

## 2021-12-31 NOTE — ASSESSMENT & PLAN NOTE
S/p laparoscopic low anterior resection of his rectal mass  Patient is tolerating diet, bowels moving  Vital signs stable  Management per surgery

## 2021-12-31 NOTE — PLAN OF CARE
Problem: Adult Inpatient Plan of Care  Goal: Plan of Care Review  Outcome: Progressing  Flowsheets (Taken 12/31/2021 1312)  Progress: improving  Plan of Care Reviewed With: patient  Outcome Summary: PT eval complete

## 2021-12-31 NOTE — HOSPITAL COURSE
Lake is a 56 y.o. male admitted on 12/30/2021 with Rectal mass [K62.89]. Principal problem is Rectal mass.    Past Medical History  Lake has a past medical history of Arthritis, COVID-19 vaccine series completed, Diabetes (CMS/[facility]), Eye injury, Hypertension, Migraine, and Type 2 diabetes mellitus (CMS/[facility]).    History of Present Illness    Patient presents for planned cystoscopy s/p new rectal mass

## 2021-12-31 NOTE — PROGRESS NOTES
Patient:  Lake Martin  Location:  [address]  MRN:  [id number]  Today's date:  12/31/2021     Session ended c pt seated in bedside chair, (+) alarmed, all immediate needs within reach, NAD/VSS, RN notified.     Lake is a 56 y.o. male admitted on 12/30/2021 with Rectal mass [K62.89]. Principal problem is Rectal mass.    Past Medical History  Lake has a past medical history of Arthritis, COVID-19 vaccine series completed, Diabetes (CMS/[facility]), Eye injury, Hypertension, Migraine, and Type 2 diabetes mellitus (CMS/[facility]).    History of Present Illness    Patient presents for planned cystoscopy s/p new rectal mass          OT Vitals    Date/Time Pulse HR Source SpO2 Pt Activity O2 Therapy BP BP Location BP Method Pt Position Observations [facility]   12/31/21 1023 73 Monitor 97 % At rest None (Room air) 176/94 Left upper arm Automatic Sitting -- PER   12/31/21 1024 71 Monitor 94 % At rest None (Room air) 183/100 Left upper arm Automatic Sitting Post activity - PCT made aware PER      OT Pain    Date/Time Pain Type Rating: Rest Rating: Activity [facility]   12/31/21 1023 Pain Assessment 0 - no pain 0 - no pain PER          Prior Living Environment      Most Recent Value   Current Living Arrangements home   Living Environment Comment 2STH w/ son and spouse,  1 MAR from garage,  1/2 bathroom on 1st FL,  FF w/ railing to 2nd FL bedroom and stall shower w/o AE/DME        Prior Level of Function      Most Recent Value   Dominant Hand right   Ambulation independent   Transferring independent   Toileting independent   Bathing independent   Dressing independent   Eating independent   Prior Level of Function Comment Fully independent PTA w/o AD,  drives and works full-time as an    Assistive Device Currently Used at Home none        Occupational Profile      Most Recent Value   Reason for Services/Referral s/p cystocopy   Successful Occupations Independent PTA   Occupational History/Life Experiences ,  has  a son   Performance Patterns Active,  works,  drives   Environmental Supports and Barriers Supportive wife will be home at D/C   Patient Goals To walk more,  to go home on Sunday           OT Evaluation and Treatment - 12/31/21 1023        OT Time Calculation    Start Time 1023     Stop Time 1056     Time Calculation (min) 33 min        Session Details    Document Type initial evaluation     Mode of Treatment occupational therapy        General Information    Patient Profile Reviewed yes     Onset of Illness/Injury or Date of Surgery 12/30/21     Referring Physician Juan     Patient/Family/Caregiver Comments/Observations RN cleared for OT session     General Observations of Patient Pt rec'd resting in bed; +ADITYA drain and indwelling catheter in place     Existing Precautions/Restrictions aspiration        Cognition/Psychosocial    Affect/Mental Status (Cognition) WNL;anxious     Orientation Status (Cognition) oriented x 4     Follows Commands (Cognition) WFL     Cognitive Function WFL     Comment, Cognition Anxious re: sensory impairments in RUE s/p surgery; overall very receptive and pleasant t/o evaluation        Hearing Assessment    Hearing Status WFL        Vision Assessment/Intervention    Visual Impairment/Limitations corrective lenses for reading        Sensory Assessment (Somatosensory)    Sensory Assessment (Somatosensory) right UE     Right UE Sensory Assessment impaired     Sensory Subjective Reports numbness;tingling   Glove distribution       Range of Motion (ROM)    Range of Motion ROM is WFL;bilateral upper extremities        Strength (Manual Muscle Testing)    Strength (Manual Muscle Testing) strength is WFL;bilateral upper extremities     Hand, Left (Strength) 5/5     Hand, Right (Strength) 4-/5        Strength Comprehensive (MMT)    Comment Slightly impaired (R) gross grasp strength        Bed Mobility    [place], Supine to Sit supervision;verbal cues     Verbal Cues (Supine to Sit) technique      Assistive Device bed rails;head of bed elevated     Comment (Bed Mobility) Log-roll technique        Transfers    Transfers toilet transfer     Comment Supervision for functional transfers/mobility w/ RW in room, bathroom and unit hallway        Bed to Chair Transfer    [place], Bed to Chair supervision;verbal cues     Verbal Cues preparatory posture;proper use of assistive device     Assistive Device walker, front-wheeled        Sit to Stand Transfer    [place], Sit to Stand Transfer supervision;verbal cues     Verbal Cues hand placement;technique     Assistive Device walker, front-wheeled        Stand to Sit Transfer    [place], Stand to Sit Transfer supervision;verbal cues     Verbal Cues hand placement;technique     Assistive Device walker, front-wheeled        Toilet Transfer    Transfer Technique stand-sit;sit-stand     [place], Toilet Transfer close supervision;verbal cues     Verbal Cues hand placement;preparatory posture;safety;technique     Assistive Device grab bars/safety frame;walker, front-wheeled     Comment Lower height toilet w/ recommendations to purchase toilet riser if needed for ease of transfer at home        Balance    Static Sitting Balance WFL     Dynamic Sitting Balance WFL     Static Standing Balance WFL     Dynamic Standing Balance WFL        Motor Skills    Motor Skills functional endurance     Functional Endurance WFL - elevated BP post-activity; RN aware        Therapeutic Exercise    Therapeutic Exercise hand        Hand (Therapeutic Exercise)    Exercise Position/Type seated;resistive exercises     General Exercise right; strengthening     Weight/Resistance squeeze ball/egg        Bathing    Comment Reports built-in shower chair in stall shower; recommend purchasing shower chair w/ elevated height if needed        Lower Body Dressing    Tasks doff;don;socks     [place] moderate assist (50-74% patient effort)     Position supported sitting     Adaptive  Equipment none     Comment Achieves full BLE distal reach w/ +effort in crossed-leg positioning; educated on use of LH AE if need; however, did not trial today. Will most likely have wife assist at home        Toileting    [place] supervision     Position unsupported sitting;supported standing     Adaptive Equipment none     Comment W/ unilateral UE support on RW in stance        BADL Safety/Performance    Impairments, BADL Safety/Performance endurance/activity tolerance;pain        ADL Interventions    Energy Conservation Techniques activity pacing encouraged;correct body mechanics utilized;equipment and device use facilitated     Self-Care (BADL) Promotion rest breaks provided        AM-PAC (TM) - ADL (Current Function)    Putting on and taking off regular lower body clothing? 2 - A Lot     Bathing? 3 - A Little     Toileting? 4 - None     Putting on/taking off regular upper body clothing? 4 - None     How much help for taking care of personal grooming? 4 - None     Eating meals? 4 - None     AM-PAC (TM) ADL Score 21        Assessment/Plan (OT)    Daily Outcome Statement Lake was seen today for OT IE s/p cystocopy. Fortunately, he denies any pain t/o evaluation. He is anxious re: impaired sensation along (R) forearm due to positioning during surgery but was provided a resistive stress ball w/ exercises to promote strength/sensory return. He transfers/ambulates at supervision level w/ RW, performs toileting w/o assist but requires mod (A) for distal LBD due to abdominal incision/decreased flexibilty which he plans to have wife assist w/ at home. At this time, he is cleared to return home w/ his family once medically stable. Will continue to follow acutely for OT while in hospital.     Rehab Potential good, to achieve stated therapy goals     Therapy Frequency 5 times/wk     Planned Therapy Interventions activity tolerance training;adaptive equipment training;BADL retraining;functional balance retraining;IADL  "retraining;neuromuscular control/coordination retraining;occupation/activity based interventions;passive ROM/stretching;patient/caregiver education/training;ROM/therapeutic exercise;strengthening exercise;transfer/mobility retraining               OT Assessment/Plan      Most Recent Value   OT Recommended Discharge Disposition home with assistance  [home w/ wife and son] at 12/31/2021 1023   Anticipated Equipment Needs At Discharge (OT) shower chair, walker, front-wheeled  [pending progress] at 12/31/2021 1023   Patient/Family Therapy Goal Statement \"To get back to normal\" at 12/31/2021 1023                    Education Documentation  Treatment Plan, taught by Castro Pereira OT at 12/31/2021 11:13 AM.  Learner: Patient  Readiness: Acceptance  Method: Explanation  Response: Verbalizes Understanding  Comment: OT role/POC; safety w/ transfers/ADLs; energy conservation; recommended AE/DME for home if need; assist from wife if needed; use of call bell; (R) hand TE          OT Goals      Most Recent Value   Bed Mobility Goal 1    Activity/Assistive Device sit to supine/supine to sit at 12/31/2021 1023   [place] modified independence, verbal cues required at 12/31/2021 1023   Time Frame by discharge at 12/31/2021 1023   Progress/Outcome goal ongoing at 12/31/2021 1023   Transfer Goal 1    Activity/Assistive Device toilet, walker, front-wheeled at 12/31/2021 1023   [place] modified independence, supervision required at 12/31/2021 1023   Time Frame by discharge at 12/31/2021 1023   Strategies/Barriers LRAD at 12/31/2021 1023   Progress/Outcome goal ongoing at 12/31/2021 1023   Transfer Goal 2    Activity/Assistive Device shower, no assistive device, walker, front-wheeled at 12/31/2021 1023   [place] modified independence at 12/31/2021 1023   Time Frame by discharge at 12/31/2021 1023   Strategies/Barriers LRAD at 12/31/2021 1023   Progress/Outcome goal ongoing at 12/31/2021 1023   Dressing Goal 1  "   Activity/Adaptive Equipment lower body dressing at 12/31/2021 1023   [place] minimum assist (75% or more patient effort) at 12/31/2021 1023   Time Frame by discharge at 12/31/2021 1023   Progress/Outcome goal ongoing at 12/31/2021 1023

## 2021-12-31 NOTE — CONSULTS
Brief Nutrition Assessment Reason for consult: diet education     Nutrition Interventions/Recommendations:   1. Continue to advance to low fiber diet, attached to discharge summary.   2. Weekly weights  3. Consider daily multivitamin     Monitor:  1. % PO intake  2. Diet tolerance  3. Weight changes  4. Labs-replete prn  5. Skin integrity  6. Bowel function    Goals:         1. To consume and tolerate >/= 75% of estimated needs via goal diet      Clinical Course: Patient is a 56 y.o. male who was admitted on 12/30/2021 with a diagnosis of Rectal mass [K62.89].   Patient is s/p LAR due to rectal mass, now on clear liquid, NCS.  Consult for diet education; RD working remote, no answer when attempted to reach patient by phone, attached low fiber diet information to chart.     Nutrition Focused Physical Exam:  BMI 40- obesity class 1         Dietary Orders   (From admission, onward)             Start     Ordered    12/30/21 2306  Adult Diet Clear Liquids; RD/LDN may adjust order; No Concentrated Sweets  Diet effective now        References:    IDDSI Diet reference   Question Answer Comment   Diet Texture Clear Liquids    Delegation of Authority. Diet orders written by PA/CRNPs may not be adjusted by RD/LDNs. RD/LDN may adjust order    Other Restriction(s): No Concentrated Sweets        12/30/21 2306    12/30/21 1827  Advance diet as tolerated  Until discontinued        Comments: Advance patient to the target diet when the following criteria are met: when tolerating liquids   Question:  Target Diet:  Answer:  Low fiber    12/30/21 1826              Weights (last 7 days)     Date/Time Weight    12/31/21 0503 150 kg (330 lb 8 oz)    12/30/21 1827 146 kg (322 lb)    12/30/21 0558 140 kg (309 lb)        Wt Readings from Last 3 Encounters:   12/31/21 (!) 150 kg (330 lb 8 oz)   12/22/21 (!) 142 kg (314 lb)   12/22/21 (!) 143 kg (314 lb 14.4 oz)       Reason for Assessment  Reason For Assessment: physician consult (diet  "education and MST for weight loss)  Diagnosis: gastrointestinal disease (rectal mass)    MST Nutrition Screen Tool  Has patient lost weight without trying?: 0-->Yes  If yes,how much weight has been lost?: 1-->2-13 pounds  Has patient been eating poorly due to decreased appetite?: 0-->No  MST Nutrition Screen Score: 1         Physical Findings  Last Bowel Movement: 12/29/21  Skin:  (surgical incision on abdomen s/p LAR)    Last Bowel Movement: 12/29/21    Nutrition Order  Nutrition Order: does not meet nutritional requirements  Nutrition Order Comments: CLD, NCS    Anthropometrics  Height: 193 cm (6' 4\")           Current Weight  Weight Method: Bed scale  Weight: (!) 150 kg (330 lb 8 oz)    Ideal Body Weight (IBW)  Ideal Body Weight (IBW) (kg): 93.24  % Ideal Body Weight: 150.32       Body Mass Index (BMI)  BMI (Calculated): 40.2  BMI Assessment: BMI 40 or greater: obesity grade III                        Labs/Procedures/Meds  Lab Results Reviewed: reviewed  Lab Results Comments: K 3.0L, Gluc 147H, WBC 11.27H, -[phone]    Results from last 7 days   Lab Units 12/31/21  0502   SODIUM mEQ/L 137   POTASSIUM mEQ/L 3.0*   CHLORIDE mEQ/L 102   CO2 mEQ/L 26   BUN mg/dL 11   CREATININE mg/dL 0.9   GLUCOSE mg/dL 147*   CALCIUM mg/dL 8.4*               Lab Results   Component Value Date    HGBA1C 6.7 (H) 03/05/2021    HGBA1C 9.4 (H) 12/31/2019    HGBA1C 8.5 (H) 12/15/2015     No results found for: [id number]  Lab Results   Component Value Date    CALCIUM 8.4 (L) 12/31/2021     Results from last 7 days   Lab Units 12/31/21  0502   WBC K/uL 11.27*   HEMOGLOBIN g/dL 13.1*   HEMATOCRIT % 39.2*   PLATELETS K/uL 184               Results from last 7 days   Lab Units 12/31/21  0502   MAGNESIUM mg/dL 1.8        Diagnostic Tests/Procedures  Diagnostic Test/Procedure Reviewed: reviewed    Medications  Pertinent Medications Reviewed: reviewed  Pertinent Medications Comments: colace, heparin, insulin  • docusate sodium  100 mg " oral BID   • gabapentin  300 mg oral TID   • heparin (porcine)  5,000 Units subcutaneous q8h TANYA   • insulin aspart U-100  6-10 Units subcutaneous TID with meals   • losartan-hydrochlorothiazide (HYZAAR) 50-12.5 combo dose   oral Daily     • lactated ringer's   Stopped (12/30/21 1542)   • lactated ringer's  80 mL/hr 80 mL/hr (12/30/21 1829)       Nutritional Needs Met?: Yes  Nutrition Status Classification: Mild nutritional compromise (GI surgery, BMI 40; L1)        Date: 12/31/21  Signature: Suha Duffy, JESSICA, LDN

## 2021-12-31 NOTE — PROGRESS NOTES
Patient: Lake Martin  Location:  [address]  MRN:  [id number]  Today's date:  12/31/2021  Pt left in bedside recliner chair, with personal items and call bell within reach. Alarmed. VSS. Nurse notified.  Lake is a 56 y.o. male admitted on 12/30/2021 with Rectal mass [K62.89]. Principal problem is Rectal mass.    Past Medical History  Lake has a past medical history of Arthritis, COVID-19 vaccine series completed, Diabetes (CMS/[facility]), Eye injury, Hypertension, Migraine, and Type 2 diabetes mellitus (CMS/[facility]).    History of Present Illness    Patient presents for planned cystoscopy s/p new rectal mass          PT Vitals    Date/Time Pulse HR Source SpO2 Pt Activity O2 Therapy BP BP Location BP Method Pt Position Observations [facility]   12/31/21 1024 71 Monitor 94 % At rest None (Room air) 183/100 Left upper arm Automatic Sitting Post activity - PCT made aware PER   12/31/21 1056 69 -- 97 % -- None (Room air) 176/94 Left upper arm Automatic Sitting -- SA   12/31/21 1057 71 -- 94 % -- None (Room air) 183/100 Left upper arm Automatic Sitting -- SA      PT Pain    Date/Time Pain Type Rating: Rest Rating: Activity Rating: Rest Rating: Activity [facility]   12/31/21 1047 Pain Reassessment -- -- 0 - no pain 0 - no pain [facility]   12/31/21 1056 Pain Assessment 0 0 -- -- SA          Prior Living Environment      Most Recent Value   Current Living Arrangements home   Living Environment Comment 2STH w/ son and spouse,  1 MAR from garage,  1/2 bathroom on 1st FL,  FF w/ railing to 2nd FL bedroom and stall shower w/o AE/DME        Prior Level of Function      Most Recent Value   Dominant Hand right   Ambulation independent   Transferring independent   Toileting independent   Bathing independent   Dressing independent   Eating independent   Prior Level of Function Comment PTA: independent with ADLs and functional mobility,  ,    Assistive Device Currently Used at Home none           PT Evaluation and  Treatment - 12/31/21 1157        PT Time Calculation    Start Time 1024     Stop Time 1057     Time Calculation (min) 33 min        Session Details    Document Type initial evaluation     Mode of Treatment physical therapy        General Information    Patient/Family/Caregiver Comments/Observations RN cleared for PT eval     General Observations of Patient received lying supine in bed, agreeable to tx     Existing Precautions/Restrictions fall        Living Environment    Primary Care Provided by self        Cognition/Psychosocial    Affect/Mental Status (Cognition) WFL;anxious     Orientation Status (Cognition) oriented x 4     Follows Commands (Cognition) WFL     Cognitive Function WFL        Sensory    Hearing Status WFL        Vision Assessment/Intervention    Visual Impairment/Limitations corrective lenses full-time        Sensory Assessment (Somatosensory)    Sensory Assessment (Somatosensory) LE sensation intact        Range of Motion (ROM)    Range of Motion bilateral lower extremities     Left Lower Extremity (ROM) left LE ROM is WFL     Right Lower Extremity (ROM) right LE ROM is WFL        Strength (Manual Muscle Testing)    Strength (Manual Muscle Testing) bilateral lower extremities     Left Lower Extremity Strength left LE strength is WFL     Right Lower Extremity Strength right LE strength is WFL        Bed Mobility    Bed Mobility bed mobility (all) activities     [place], Supine to Sit supervision     Verbal Cues (Supine to Sit) safety;technique     Assistive Device bed rails     Comment (Bed Mobility) Supervision for safety, educated pt on log roll technique        Sit to Stand Transfer    [place], Sit to Stand Transfer supervision     Verbal Cues safety;technique     Assistive Device walker, front-wheeled     Comment Supervision for safety, VC's to push off from bed        Stand to Sit Transfer    [place], Stand to Sit Transfer supervision     Verbal Cues safety;technique      Assistive Device walker, front-wheeled     Comment fair eccentric control, VC's for sequencing        Gait Training    [place], Gait supervision     Assistive Device walker, front-wheeled     Distance in Feet 120 feet     Pattern (Gait) step-to     Deviations/Abnormal Patterns (Gait) step length decreased;gait speed decreased;vinay decreased     Comment (Gait/Stairs) Supervision for safety, presents no LOB during ambulation        Stairs Training    [place], Stairs not tested        Balance    Balance Assessment sitting static balance;sitting dynamic balance;standing static balance;standing dynamic balance     Static Sitting Balance WFL     Dynamic Sitting Balance WFL     Static Standing Balance WFL     Dynamic Standing Balance WFL        Motor Skills    Motor Skills coordination     Coordination WFL        AM-PAC (TM) - Mobility (Current Function)    Turning from your back to your side while in a flat bed without using bedrails? 4 - None     Moving from lying on your back to sitting on the side of a flat bed without using bedrails? 3 - A Little     Moving to and from a bed to a chair? 3 - A Little     Standing up from a chair using your arms? 3 - A Little     To walk in a hospital room? 3 - A Little     Climbing 3-5 steps with a railing? 3 - A Little     AM-PAC (TM) Mobility Score 19        Assessment/Plan (PT)    Daily Outcome Statement PT eval complete. Pt grossly supervision overall for functional mobility. At baseline, pt is independent with ADLs and ambulation with no AD use. Pt would benefit to continue skilled IP PT services to help maximize functional independence.     Rehab Potential good, to achieve stated therapy goals     Therapy Frequency 3-5 times/wk     Planned Therapy Interventions bed mobility training;gait training;stair training;strengthening;transfer training               PT Assessment/Plan      Most Recent Value   PT Recommended Discharge Disposition home with assistance at  12/31/2021 1157   Anticipated Equipment Needs at Discharge (PT) none at 12/31/2021 1157   Patient/Family Therapy Goals Statement to go home at 12/31/2021 1157                    Education Documentation  Fall Prevention, taught by Judith Mora at 12/31/2021 12:10 PM.  Learner: Patient  Readiness: Acceptance  Method: Explanation  Response: Verbalizes Understanding          PT Goals      Most Recent Value   Bed Mobility Goal 1    Activity/Assistive Device bed mobility activities, all at 12/31/2021 1157   [place] modified independence at 12/31/2021 1157   Time Frame by discharge at 12/31/2021 1157   Progress/Outcome goal ongoing at 12/31/2021 1157   Transfer Goal 1    Activity/Assistive Device sit-to-stand/stand-to-sit at 12/31/2021 1157   [place] independent at 12/31/2021 1157   Time Frame by discharge at 12/31/2021 1157   Progress/Outcome goal ongoing at 12/31/2021 1157   Gait Training Goal 1    Activity/Assistive Device gait (walking locomotion) at 12/31/2021 1157   [place] independent at 12/31/2021 1157   Distance 150 at 12/31/2021 1157   Progress/Outcome goal ongoing at 12/31/2021 1157   Stairs Goal 1    Activity/Assistive Device stairs, all skills at 12/31/2021 1157   [place] modified independence at 12/31/2021 1157   Number of Stairs 6 at 12/31/2021 1157   Time Frame by discharge at 12/31/2021 1157   Progress/Outcome goal ongoing at 12/31/2021 1157

## 2021-12-31 NOTE — PLAN OF CARE
Problem: Adult Inpatient Plan of Care  Goal: Plan of Care Review  Outcome: Progressing  Flowsheets (Taken 12/31/2021 1113)  Plan of Care Reviewed With: patient  Outcome Summary: OT IE completed. Cleared for home whenever medically stable

## 2021-12-31 NOTE — CONSULTS
Hospital Medicine Service -  Inpatient Consultation         Requesting Physician: Dr. Martinez    Reason for Consultation: Medical management     HISTORY OF PRESENT ILLNESS        This is a 56 y.o. male with past medical history of hypertension, type 2 diabetes and newly diagnosed rectal mass.  He presents for laparoscopic low anterior resection of his rectal mass.  He tolerated surgery well.  He is seen postop and denies pain, fevers, chills, nausea, vomiting, constipation diarrhea, headaches, chest discomfort or shortness of breath.  Since his procedure, he has had right-sided arm numbness and tingling related to laying on his right side for a prolonged period of time.  He is right-handed and has been doing continuous exercises in bed and feels this is improving.    He last took Metformin on Tuesday and is HCTZ/losartan combination on the day of admission.    PAST MEDICAL AND SURGICAL HISTORY        Past Medical History:   Diagnosis Date   • Arthritis     knees   • COVID-19 vaccine series completed     pfizer plus booster   • Diabetes (CMS/[facility])    • Eye injury     Right eye pressure injury   • Hypertension    • Migraine     hasnt had one in approx 2 yrs -2019   • Type 2 diabetes mellitus (CMS/[facility])        Past Surgical History:   Procedure Laterality Date   • COLONOSCOPY  2021       PCP: Leland Fishman MD    MEDICATIONS        Home Medications:  Medications Prior to Admission   Medication Sig Dispense Refill Last Dose   • losartan-hydrochlorothiazide (HYZAAR) 50-12.5 mg per tablet TAKE 1 TABLET BY MOUTH EVERY DAY (Patient taking differently: nightly.  ) 90 tablet 1 12/30/2021 at 0430   • metFORMIN 1,000 mg tablet TAKE 1 TABLET BY MOUTH TWICE A DAY WITH MEALS 60 tablet 0 Past Week at Unknown time   • NON FORMULARY MEDICATION REQUEST nightly. Juice plus vegetable and fruit 1 capsule po nightly    Past Week at Unknown time       Current inpatient medications were personally reviewed.    ALLERGIES     "    Patient has no known allergies.    FAMILY HISTORY        Family History   Problem Relation Age of Onset   • Other Biological Mother         pacemaker   • Stroke Biological Father    • Diabetes Biological Father    • Heart failure Biological Father    • No Known Problems Biological Sister    • Lung cancer Maternal Grandfather         Smoker 50 years    • Colon cancer Neg Hx    • Rectal cancer Neg Hx        SOCIAL HISTORY        Social History     Socioeconomic History   • Marital status:      Spouse name: Christi    • Number of children: 1   • Years of education: None   • Highest education level: None   Occupational History   • Occupation: Electorical consultant    Tobacco Use   • Smoking status: Never Smoker   • Smokeless tobacco: Never Used   Vaping Use   • Vaping Use: Never used   Substance and Sexual Activity   • Alcohol use: Not Currently   • Drug use: Never   • Sexual activity: Defer   Other Topics Concern   • None   Social History Narrative   • None     Social Determinants of Health     Financial Resource Strain: Not on file   Food Insecurity: Not on file   Transportation Needs: Not on file   Physical Activity: Not on file   Stress: Not on file   Social Connections: Not on file   Intimate Partner Violence: Not on file   Housing Stability: Not on file       REVIEW OF SYSTEMS        All other systems reviewed and negative except as noted in HPI    PHYSICAL EXAMINATION        Visit Vitals  BP (!) 196/85   Pulse 71   Temp 36.8 °C (98.2 °F) (Oral)   Resp 16   Ht 1.93 m (6' 4\")   Wt (!) 146 kg (322 lb)   SpO2 96%   BMI 39.20 kg/m²     Body mass index is 39.2 kg/m².    Intake/Output Summary (Last 24 hours) at 12/30/2021 2310  Last data filed at 12/30/2021 1740  Gross per 24 hour   Intake 1875 ml   Output -120 ml   Net 1995 ml       Physical Exam  No acute distress, laying flat in bed  Atraumatic, normocephalic  Moist mucous membranes  Regular rate rhythm, no murmur auscultated  Lungs clear " bilaterally  Abdomen is obese, soft, nontender, nondistended  No [place] catheter  No peripheral edema  Skin is warm dry intact  Alert oriented x3  There is diminished sensation to light touch and cold on the right upper extremity from fingers and hand up into the mid humerus.  Hand  4-5 right upper extremity, 5 out of 5 left upper extremity  Pleasant normal affect  LABS / EKG        Labs    poc 216      SARS-CoV-2 (COVID-19) (no units)   Date/Time Value   12/27/2021 0856 Negative        ECG/Telemetry  na    Imaging  Not applicable    ASSESSMENT AND RECOMMENDATIONS           Primary hypertension  Assessment & Plan  Pressure is elevated on preadmission testing and again elevated postop  Is currently receiving IV fluids, denies pain  He was given additional HCTZ/losartan this evening  Will given 1x hydralazine po for pressure 190  Continue home medication  May add calcium channel blocker in the morning if pressure not improved      Elevated lipids  Assessment & Plan  Is not on statin, should be on one given diabetes  Can discuss prior to discharge or PCP follow up    Type 2 diabetes mellitus without complication, without long-term current use of insulin (CMS/[facility])  Assessment & Plan  Maintained on Metformin 1000 twice daily  Continue with Accu-Cheks and correction factor while inpatient  Diabetic diet when advanced from clears    * Rectal mass  Assessment & Plan  S/p resection 12/30  Awaiting pathology  Pain mgmt per surgery       DVT ppx per surgery  Diet: PETER chaudhry DO  12/30/2021

## 2021-12-31 NOTE — ASSESSMENT & PLAN NOTE
Maintained on Metformin 1000 twice daily  Continue with Accu-Cheks and correction factor while inpatient  Diabetic diet when advanced from clears

## 2021-12-31 NOTE — CONSULTS
Neurology Consult Note    Subjective     [place] Martin is a 56 y.o. right-handed man with history of hypertension, DM 2, newly diagnosed rectal mass, status post laparoscopic low anterior resection of rectal mass on 12/30/2021.  Since his procedure, he has had numbness in the right inner forearm and fingers.  He also has weakness in right hand .  He thinks that there has been an improvement in symptoms since yesterday.    No pain in the neck, right shoulder, right arm, right hand.    No symptoms in the left arm or in either leg.      Review of Systems  All other systems reviewed and negative except as noted in the HPI.    Allergies: Patient has no known allergies.    Current Inpatient Medications   Medication Dose Route Frequency Provider Last Rate Last Admin   • acetaminophen (TYLENOL) tablet 650 mg  650 mg oral q4h PRN Tiarra Martinez MD       • alum-mag hydroxide-simeth (MAALOX) 200-200-20 mg/5 mL suspension 30 mL  30 mL oral q4h PRTiarra Cruz MD       • calcium gluconate 1 gram/50 mL IVPB in NSS 1 g  1 g intravenous q6h PRN Tiarra Martinez MD       • glucose chewable tablet 16-32 g of dextrose  16-32 g of dextrose oral PRTiarra Cruz MD        Or   • dextrose 40 % oral gel 15-30 g of dextrose  15-30 g of dextrose oral PRTiarra Cruz MD        Or   • glucagon (GLUCAGEN) injection 1 mg  1 mg intramuscular PRN Tiarra Martinez MD        Or   • dextrose in water injection 12.5 g  25 mL intravenous PRN Tiarra Martinez MD       • diphenhydrAMINE (BENADRYL) injection 25 mg  25 mg intravenous q6h PRTiarra Cruz MD       • docusate sodium (COLACE) capsule 100 mg  100 mg oral BID Tiarra Martinez MD   100 mg at 12/31/21 0833   • gabapentin (NEURONTIN) capsule 300 mg  300 mg oral TID Tiarra Martinez MD   300 mg at 12/31/21 0833   • heparin (porcine) 5,000 unit/mL injection 5,000 Units  5,000 Units subcutaneous q8h [facility] Tiarra Martinez MD   5,000 Units at 12/31/21 0525   •  hydrALAZINE (APRESOLINE) 10 mg in sodium chloride 0.9 % 50 mL IVPB  10 mg intravenous q8h PRN Luciana Masters  mL/hr at 12/31/21 1134 10 mg at 12/31/21 1134   • oxyCODONE (ROXICODONE) immediate release tablet 5 mg  5 mg oral q4h PRN Tiarra Martinez MD   5 mg at 12/31/21 1002    And   • HYDROmorphone (DILAUDID) injection 0.25 mg  0.25 mg intravenous q4h PRN Tiarra Martinez MD       • insulin aspart U-100 (NovoLOG) pen 6-10 Units  6-10 Units subcutaneous TID with meals Tiarra Martinez MD       • lactated ringer's infusion   intravenous Continuous Tiarra Martinez MD   Stopped at 12/30/21 1542   • lactated ringer's infusion  80 mL/hr intravenous Continuous Tiarra Martinez MD 80 mL/hr at 12/30/21 1829 80 mL/hr at 12/30/21 1829   • losartan (COZAAR) 50 mg, hydrochlorothiazide (HYDRODIURIL) 12.5 mg for HYZAAR 50-12.5   oral Daily Tiarra Martinez MD   Given at 12/31/21 0833   • magnesium oxide (MAG-OX) tablet 400 mg  400 mg oral 2x daily PRN Tiarra Martinez MD       • magnesium sulfate IVPB 2g in 50 mL NSS/D5W/SWFI  2 g intravenous PRN Tiarra Martinez MD       • ondansetron ODT (ZOFRAN-ODT) disintegrating tablet 4 mg  4 mg oral q8h PRN Tiarra Martinez MD       • potassium chloride (KLOR-CON) tablet extended release 20 mEq  20 mEq oral PRN Tiarra Martinez MD       • potassium chloride (KLOR-CON) tablet extended release 40 mEq  40 mEq oral PRN Tiarra Martinez MD   40 mEq at 12/31/21 0631       Medical History:   Past Medical History:   Diagnosis Date   • Arthritis     knees   • COVID-19 vaccine series completed     pfizer plus booster   • Diabetes (CMS/HCC)    • Eye injury     Right eye pressure injury   • Hypertension    • Migraine     hasnt had one in approx 2 yrs -2019   • Type 2 diabetes mellitus (CMS/HCC)        Surgical History:   Past Surgical History:   Procedure Laterality Date   • COLONOSCOPY  2021       Social History:   Social History     Socioeconomic History   • Marital status:       Spouse name: Christi    • Number of children: 1   • Years of education: None   • Highest education level: None   Occupational History   • Occupation: Electorical consultant    Tobacco Use   • Smoking status: Never Smoker   • Smokeless tobacco: Never Used   Vaping Use   • Vaping Use: Never used   Substance and Sexual Activity   • Alcohol use: Not Currently   • Drug use: Never   • Sexual activity: Defer   Other Topics Concern   • None   Social History Narrative   • None     Social Determinants of Health     Financial Resource Strain: Not on file   Food Insecurity: Not on file   Transportation Needs: Not on file   Physical Activity: Not on file   Stress: Not on file   Social Connections: Not on file   Intimate Partner Violence: Not on file   Housing Stability: Not on file       Family History:   Family History   Problem Relation Age of Onset   • Other Biological Mother         pacemaker   • Stroke Biological Father    • Diabetes Biological Father    • Heart failure Biological Father    • No Known Problems Biological Sister    • Lung cancer Maternal Grandfather         Smoker 50 years    • Colon cancer Neg Hx    • Rectal cancer Neg Hx        Objective     Physical Exam  Awake, alert, speech is clear, language intact.    Cranial nerves -pupils are equal, round, reactive to light and accommodation.  Extraocular muscle movements are intact.  Visual fields are full to confrontation bilaterally.  There is no facial asymmetry.  Facial sensation is intact to light touch.  Tongue is midline.    Motor -no drift.  Right arm: Deltoid 5/5, biceps 5/5, triceps 5/5, wrist extension 4+/5, finger flexion 4/5, hand intrinsics 4/5.    Strength is 5/5 in the left arm and hand, both legs and feet.    Coordination rapid alternating movements are symmetric.  Finger to nose is intact.    Sensation -decreased light touch in the right forearm and hand.  Sensation to light touch is normal in the left arm and hand and in both legs and  feet.    Reflexes -biceps 2/4 bilaterally, knee jerks 2/4 bilaterally, with toes downgoing bilaterally.    Gait - normal on casual walking.      Labs  Sodium            137    Sodium      Potassium            3.0    Potassium     Chloride            102    Chloride     CO2            26    CO2     BUN            11    BUN     Creatinine            0.9    Creatinine     Glucose            147    Glucose     Calcium            8.4    Calcium     eGFR            >60.0    eGFR     Anion Gap            9    Anion Gap     Magnesium            1.8    Magnesium     CALCIUM / BONE    Calcium            8.4    Calcium     CBC    WBC            11.27    WBC     RBC            4.25    RBC     Hemoglobin            13.1    Hemoglobin     Hematocrit            39.2    Hematocrit     MCV            92.2    MCV     MCH            30.8    MCH     MCHC            33.4    MCHC     RDW            13.1    RDW     Platelets            184    Platelets     MPV            11.1    MPV     PROTIME W/ INR    Protime  13.1              Protime     INR  1.0              INR     PTT    PTT  26              PTT     SARS-CoV-2 (COVID-19)   Negative             SARS-CoV-2 (COVID       Neurologic Studies      Cardiac    Right arm numbness  Assessment & Plan  status post laparoscopic low anterior resection of rectal mass on 12/30/2021.      Postoperatively, had right arm numbness and right hand weakness.    Suspect compression of medial cord or inferior trunk    Symptoms are improving.      PLAN     1.) OT     2.) follow neuro exam       Assessment         Plan

## 2021-12-31 NOTE — PROGRESS NOTES
Daily Progress Note    Subjective   POD #1 Bilateral illuminated retrocatheters (Black on Left), 18Fr krause catheter  LOW ANTERIOR RESECTION LAPAROSCOPIC ROBOTIC Robotic Takedown Splenic Flexure, Primary Colorectal anastimosis    Pt feeling well.  Denies CP, SOB, n/v/f/c, leg/calf pain.  No flatus.  Left ureteral stent and krause draining yellow urine  Creat 0.9      Objective     Vital signs in last 24 hours:  Temp:  [36.3 °C (97.3 °F)-37.5 °C (99.5 °F)] 37.2 °C (98.9 °F)  Heart Rate:  [71-88] 73  Resp:  [11-17] 16  BP: (135-198)/() 180/86      Intake/Output Summary (Last 24 hours) at 12/31/2021 0804  Last data filed at 12/31/2021 0531  Gross per 24 hour   Intake 1875 ml   Output 715 ml   Net 1160 ml     Intake/Output this shift:  No intake/output data recorded.    Labs  CBC Results       12/31/21 12/07/21 03/05/21     0502 0650 0827    WBC 11.27 8.75 7.5    RBC 4.25 4.95 4.87    HGB 13.1 15.2 15.2    HCT 39.2 45.1 45.3    MCV 92.2 91.1 93    MCH 30.8 30.7 31.2    MCHC 33.4 33.7 33.6     223 210        BMP Results       12/31/21 12/07/21 03/05/21     0502 0650 0827     138 140    K 3.0 4.0 3.9    Cl 102 101 102    CO2 26 26 27    Glucose 147 155 133    BUN 11 5 13    Creatinine 0.9 0.8 0.77    Calcium 8.4 9.8 --    Anion Gap 9 11 --    EGFR >60.0 >60.0 101       117          Physical Exam:  Gen: WDWN  Psych: AAO x 3, NAD  Pulm: nonlabored breathing  Abd: NT/ND, dressings c/d/i, ADITYA with blood tinged fluid  Gu: Left ureteral stent and krause draining yellow urine, Left stent removed   Ext: no c/t/e      A/P: 57 yo male with rectal mass  POD #1 Bilateral illuminated retrocatheters (Black on Left), 18Fr krause catheter  LOW ANTERIOR RESECTION LAPAROSCOPIC ROBOTIC Robotic Takedown Splenic Flexure, Primary Colorectal anastimosis  - Left ureteral stent removed today, no stents remain  - Krause per primary team  Call with questions     Bernie Temple PA-C  [facility]  [phone]

## 2021-12-31 NOTE — PROGRESS NOTES
56-year-old male status post robotic low anterior resection 12/30/2021    Complains of some numbness and weakness right hand and arm but able to move fingers freely    No abdominal pain.  No nausea  Questionable flatus overnight  No bowel movement    Vitals:    12/31/21 0400 12/31/21 0503 12/31/21 0700 12/31/21 0841   BP: (!) 180/86  (!) 157/89 (!) 180/88   Pulse: 73  68 65   Resp: 16  16 17   Temp: 37.2 °C (98.9 °F)  37.1 °C (98.8 °F) 36.6 °C (97.9 °F)   TempSrc: Oral  Oral Oral   SpO2: 98%  97% 94%   Weight:  (!) 150 kg (330 lb 8 oz)     Height:         I/O last 3 completed shifts:  In: 1875 [I.V.:1650; IV Piggyback:100]  Out: 715 [Urine:925; Drains:210; Blood:100]  Awake, alert, and oriented ×3  Abdomen soft, incisions clear    Results from last 7 days   Lab Units 12/31/21  0502   WBC K/uL 11.27*   HEMOGLOBIN g/dL 13.1*   HEMATOCRIT % 39.2*   PLATELETS K/uL 184     Results from last 7 days   Lab Units 12/31/21  0502   SODIUM mEQ/L 137   POTASSIUM mEQ/L 3.0*   CHLORIDE mEQ/L 102   CO2 mEQ/L 26   BUN mg/dL 11   CREATININE mg/dL 0.9   CALCIUM mg/dL 8.4*   GLUCOSE mg/dL 147*       Assessment/plan: Stable exam postoperative day #1 robotic low anterior resection  Low anastomosis, will go slow  Neurology consult

## 2021-12-31 NOTE — ASSESSMENT & PLAN NOTE
status post laparoscopic low anterior resection of rectal mass on 12/30/2021.      Postoperatively, had right arm numbness and right hand weakness.    Suspect compression of medial cord or inferior trunk    Symptoms are improving.      PLAN     1.) OT     2.) follow neuro exam

## 2021-12-31 NOTE — PLAN OF CARE
Problem: Adult Inpatient Plan of Care  Goal: Plan of Care Review  Flowsheets (Taken 12/31/2021 1224)  Progress: improving  Plan of Care Reviewed With: patient  Goal: Readiness for Transition of Care  Intervention: Mutually Develop Transition Plan  Flowsheets (Taken 12/31/2021 1224)  Anticipated Discharge Disposition: home without assistance or services  Equipment Needed After Discharge: none  Discharge Coordination/Progress:   Case Management Note:  met patient in the room, observed COVID-19 protocols   Introduced self, verified ID and address  PCP: Sravanthi  Pharmacy: [facility] [place]  Vaccination status: Pfizer 12/17/2021 , 4/21/2021 , 3/1/2021  Housing: lives with spouse in a private house   PLOF: independent, ambulatoryl works full time  DME: none  Social Determinates of Health: Pt has food, clothing, housing and basic needs.   HOME CARE SERVICES/SNF: none  Anticipated disposition:  home wihtout services  Transportation:  spouse  Pt has ADITYA drain, expects it will be d/c'd prior to d/c  Not interested in HC services at this time.   Will follow and and assist as needed.   Assistive Device/Animal Currently Used at Home: none  Anticipated Changes Related to Illness: none  Transportation Concerns: car, none  Readmission Within the Last 30 Days: no previous admission in last 30 days  Patient/Family Anticipated Services at Transition: none  Patient/Family Anticipates Transition to: home with family  Transportation Anticipated: family or friend will provide  Concerns to be Addressed:   no discharge needs identified   denies needs/concerns at this time  Offered/Gave Vendor List: no

## 2021-12-31 NOTE — ASSESSMENT & PLAN NOTE
Pressure is elevated on preadmission testing and again elevated postop  Is currently receiving IV fluids, denies pain  He was given additional HCTZ/losartan this evening  Will given 1x hydralazine po for pressure 190  Continue home medication  May add calcium channel blocker in the morning if pressure not improved

## 2021-12-31 NOTE — ASSESSMENT & PLAN NOTE
Is not on statin, should be on one given diabetes  Can discuss prior to discharge or PCP follow up

## 2021-12-31 NOTE — PROGRESS NOTES
Hospital Medicine Service -  Daily Progress Note       SUBJECTIVE   Interval History: Patient seen and examined, sitting in chair.  Continues to complain of right arm numbness  After surgery.  States that he is feeling well and a would like to ambulate.  Denies fever, chills, shortness of breath, dizziness.  No BM yet.  Other ROS-.  All questions answered.  Case d/w RN, wife at bedside and records reviewed.     OBJECTIVE      Vital signs in last 24 hours:  Temp:  [36.3 °C (97.3 °F)-37.5 °C (99.5 °F)] 36.6 °C (97.9 °F)  Heart Rate:  [65-88] 71  Resp:  [11-17] 17  BP: (135-198)/() 183/100  Weights (last 5 days)     Date/Time Weight    12/31/21 0503 150 kg (330 lb 8 oz)    12/30/21 1827 146 kg (322 lb)    12/30/21 0558 140 kg (309 lb)              Intake/Output Summary (Last 24 hours) at 12/31/2021 0659  Last data filed at 12/31/2021 0531  24 Hour Net Input/Output from 7AM Yesterday   Intake 1875 ml   Output 715 ml   Net 1160 ml       PHYSICAL EXAMINATION      Physical Exam  No acute distress  HEENT: Atraumatic, normocephalic  CV: Regular rate rhythm, no murmur auscultated  Pulmonary: Lungs clear bilaterally  GI: Abdomen is obese, soft, nontender, nondistended  : No Urbina catheter  Extremities: No peripheral edema  Skin: Skin is warm dry intact  Neuro: Alert oriented x3  There is diminished sensation to light touch and cold on the right upper extremity from fingers and hand up into the mid humerus.  Hand  4-5 right upper extremity, 5 out of 5 left upper extremity  Pleasant normal affect   Nursing note and vitals reviewed.     LINES, CATHETERS, DRAINS, AIRWAYS, AND WOUNDS   Lines, Drains, and Airways:  Wounds (agree with documentation and present on admission):  Peripheral IV (Adult) 12/30/21 Posterior;Right Hand (Active)   Number of days: 1       Drain 1 Lateral LLQ 15 Fr. (Active)   Number of days: 1       Urethral Catheter Double-lumen 18 Fr (Active)   Number of days: 1       Surgical Incision Abdomen  (Active)   Number of days: 1         Comments:      LABS / IMAGING / TELE      Labs  Lab Results   Component Value Date    WBC 11.27 (H) 12/31/2021    HGB 13.1 (L) 12/31/2021    HCT 39.2 (L) 12/31/2021    MCV 92.2 12/31/2021     12/31/2021     Lab Results   Component Value Date    GLUCOSE 147 (H) 12/31/2021    CALCIUM 8.4 (L) 12/31/2021     12/31/2021    K 3.0 (L) 12/31/2021    CO2 26 12/31/2021     12/31/2021    BUN 11 12/31/2021    CREATININE 0.9 12/31/2021       Microbiology Results     None          Imaging    MRI PELVIS WITH AND WITHOUT CONTRAST  Narrative: CLINICAL HISTORY: Rectal cancer staging.    TECHNIQUE:  Magnet: 1.5 T  Coil: Body  Sequences/Planes: Axial sagittal oblique  Contrast: 13.5 cc Gadavist    COMPARISON: CT scan of the chest, abdomen, and pelvis performed 12/10/2021    COMMENT:  MRI of the pelvis, with and without contrast, was performed. .    Image quality was satisfactory.    TUMOR LOCATION:  Tumor location (from anal verge):  mid (5.1-10.0 cm)  Distance of lowest extent to top of internal anal sphincter (IAS): 45 mm.  Distance of lowest extent to anal verge: 100 mm.  Relationship to anterior peritoneal reflection: Above.    TUMOR CHARACTERISTICS:  Circumferential extent/location -the tumor is anteriorly based involving 33% of  the circumference of the lumen.  It is mainly endoluminal and appears to be  based on a central stalk.  Craniocaudal extent: 30 mm.  Wall invasion: Small focal wall invasion along the right anterior aspect of the  lesion  Mucinous: No.    CATEGORY:  For rectal tumors above 5 cm:    T2    DISTANCE TO THE MESORECTAL FASCIA (MRF) AND EXTRAMURAL DEPTH OF INVASION (EMD)  Shortest distance of definitive tumor border to the EMF: not applicable  Extramural depth of invasion (EMD) at this level: n/a  Additional tumor spiculations closer to the MRF:  No tumor spiculations.  Component of tumor (any T1-3) closer to the MRF: No    EXTRAMURAL VASCULAR INVASION:  (EMVI):  No    OTHER STRUCTURES/ORGANS INVOLVED: No invasion of adjacent organ    MESORECTAL LYMPH NODES AND TUMOR DEPOSITS: None    EXTRAMESORECTAL LYMPH NODES:  Extramesorectal lymph nodes with suspicious morphology or signal: No    ADDITIONAL FINDINGS: Interval decreased signal in the left peripheral zone of  the prostate gland.  This may be related to prior bout of prostatitis.  No  restricted diffusion.  Impression: IMPRESSION:  T2N0 mid rectal tumor.      ECG/Telemetry  Reviewed by me.    ASSESSMENT AND PLAN      Right arm numbness  Assessment & Plan  Right arm numbness s/p 10-hour rectal surgery  Improving  Seen by neuro  Recommended for OT    Primary hypertension  Assessment & Plan  Continue home meds  Hydralazine as needed    Type 2 diabetes mellitus without complication, without long-term current use of insulin (CMS/[facility])  Assessment & Plan  Sliding scale insulin    * Rectal mass  Assessment & Plan  S/p laparoscopic low anterior resection of his rectal mass  Patient is tolerating diet  No BM yet   vital signs stable  Management per surgery including DVT prophylaxis and pain management         VTE Assessment: Padua    VTE Prophylaxis:  Current anticoagulants:  heparin (porcine) 5,000 unit/mL injection 5,000 Units, subcutaneous, q8h TANYA      Code Status: Full Code      Estimated Discharge Date: 1/3/2022     Disposition Planning: Per primary team     Luciana Masters, DO  12/31/2021

## 2021-12-31 NOTE — CONSULTS
Consult- see the plan of care, he does not anticipate any HC needs, expects the ADITYA to be d/c'd prior to discharge, will follow and assist with services if needed.

## 2021-12-31 NOTE — PATIENT CARE CONFERENCE
Care Progression Rounds Note  Date: 12/31/2021  Time: 11:02 AM     Patient Name: Lake Martin     Medical Record Number: [id number]   YOB: 1965  Sex: Male      Room/Bed: 4412D    Admitting Diagnosis: Rectal mass [K62.89]   Admit Date/Time: 12/30/2021  5:22 AM    Primary Diagnosis: Rectal mass  Principal Problem: Rectal mass    GMLOS: pending  Anticipated Discharge Date: 1/6/2022    AM-PAC:  Mobility Score:      Discharge Planning:  Anticipated Discharge Disposition: home without assistance or services,home with home health    Barriers to Discharge:  Medical issues not resolved    Comments:  s/p rectal mass resection  - with [facility] krause to stay today, clears, tolerating reports R arm weakness, neuro consult - PT eval pending. K repleted, CHELSEA is elevated.    Participants:  ,dietitian/nutrition services,nursing,physical therapy,social work/services

## 2022-01-01 LAB
ANION GAP SERPL CALC-SCNC: 12 MEQ/L (ref 3–15)
BUN SERPL-MCNC: 6 MG/DL (ref 8–20)
CALCIUM SERPL-MCNC: 9.2 MG/DL (ref 8.9–10.3)
CHLORIDE SERPL-SCNC: 100 MEQ/L (ref 98–109)
CO2 SERPL-SCNC: 28 MEQ/L (ref 22–32)
CREAT SERPL-MCNC: 0.6 MG/DL (ref 0.8–1.3)
ERYTHROCYTE [DISTWIDTH] IN BLOOD BY AUTOMATED COUNT: 12.9 % (ref 11.6–14.4)
GFR SERPL CREATININE-BSD FRML MDRD: >60 ML/MIN/1.73M*2
GLUCOSE BLD-MCNC: 151 MG/DL (ref 70–99)
GLUCOSE BLD-MCNC: 161 MG/DL (ref 70–99)
GLUCOSE BLD-MCNC: 198 MG/DL (ref 70–99)
GLUCOSE SERPL-MCNC: 133 MG/DL (ref 70–99)
HCT VFR BLDCO AUTO: 42.1 % (ref 40.1–51)
HGB BLD-MCNC: 14 G/DL (ref 13.7–17.5)
MAGNESIUM SERPL-MCNC: 2.1 MG/DL (ref 1.8–2.5)
MCH RBC QN AUTO: 31 PG (ref 28–33.2)
MCHC RBC AUTO-ENTMCNC: 33.3 G/DL (ref 32.2–36.5)
MCV RBC AUTO: 93.1 FL (ref 83–98)
PDW BLD AUTO: 11.3 FL (ref 9.4–12.4)
PLATELET # BLD AUTO: 194 K/UL (ref 150–350)
POCT TEST: ABNORMAL
POTASSIUM SERPL-SCNC: 3.5 MEQ/L (ref 3.6–5.1)
RBC # BLD AUTO: 4.52 M/UL (ref 4.5–5.8)
SODIUM SERPL-SCNC: 140 MEQ/L (ref 136–144)
WBC # BLD AUTO: 10.94 K/UL (ref 3.8–10.5)

## 2022-01-01 PROCEDURE — 80048 BASIC METABOLIC PNL TOTAL CA: CPT | Performed by: SURGERY

## 2022-01-01 PROCEDURE — 83735 ASSAY OF MAGNESIUM: CPT | Performed by: SURGERY

## 2022-01-01 PROCEDURE — 63700000 HC SELF-ADMINISTRABLE DRUG: Performed by: SURGERY

## 2022-01-01 PROCEDURE — 99024 POSTOP FOLLOW-UP VISIT: CPT | Performed by: SURGERY

## 2022-01-01 PROCEDURE — 63600000 HC DRUGS/DETAIL CODE: Performed by: SURGERY

## 2022-01-01 PROCEDURE — 63700000 HC SELF-ADMINISTRABLE DRUG: Performed by: STUDENT IN AN ORGANIZED HEALTH CARE EDUCATION/TRAINING PROGRAM

## 2022-01-01 PROCEDURE — 85027 COMPLETE CBC AUTOMATED: CPT | Performed by: SURGERY

## 2022-01-01 PROCEDURE — 36415 COLL VENOUS BLD VENIPUNCTURE: CPT | Performed by: SURGERY

## 2022-01-01 PROCEDURE — 99233 SBSQ HOSP IP/OBS HIGH 50: CPT | Performed by: STUDENT IN AN ORGANIZED HEALTH CARE EDUCATION/TRAINING PROGRAM

## 2022-01-01 PROCEDURE — 25800000 HC PHARMACY IV SOLUTIONS: Performed by: STUDENT IN AN ORGANIZED HEALTH CARE EDUCATION/TRAINING PROGRAM

## 2022-01-01 PROCEDURE — 12000000 HC ROOM AND CARE MED/SURG

## 2022-01-01 PROCEDURE — 63700000 HC SELF-ADMINISTRABLE DRUG: Performed by: INTERNAL MEDICINE

## 2022-01-01 PROCEDURE — 63600000 HC DRUGS/DETAIL CODE: Performed by: STUDENT IN AN ORGANIZED HEALTH CARE EDUCATION/TRAINING PROGRAM

## 2022-01-01 RX ORDER — IBUPROFEN/PSEUDOEPHEDRINE HCL 200MG-30MG
3 TABLET ORAL NIGHTLY PRN
Status: DISCONTINUED | OUTPATIENT
Start: 2022-01-01 | End: 2022-01-05 | Stop reason: HOSPADM

## 2022-01-01 RX ORDER — AMLODIPINE BESYLATE 5 MG/1
5 TABLET ORAL DAILY
Status: DISCONTINUED | OUTPATIENT
Start: 2022-01-01 | End: 2022-01-02

## 2022-01-01 RX ADMIN — DOCUSATE SODIUM 100 MG: 100 CAPSULE ORAL at 20:58

## 2022-01-01 RX ADMIN — OXYCODONE HYDROCHLORIDE 5 MG: 5 TABLET ORAL at 17:50

## 2022-01-01 RX ADMIN — POTASSIUM CHLORIDE 20 MEQ: 1500 TABLET, EXTENDED RELEASE ORAL at 17:50

## 2022-01-01 RX ADMIN — HEPARIN SODIUM 5000 UNITS: 5000 INJECTION, SOLUTION INTRAVENOUS; SUBCUTANEOUS at 13:40

## 2022-01-01 RX ADMIN — GABAPENTIN 300 MG: 300 CAPSULE ORAL at 09:31

## 2022-01-01 RX ADMIN — SODIUM CHLORIDE, POTASSIUM CHLORIDE, SODIUM LACTATE AND CALCIUM CHLORIDE: 600; 310; 30; 20 INJECTION, SOLUTION INTRAVENOUS at 05:54

## 2022-01-01 RX ADMIN — DOCUSATE SODIUM 100 MG: 100 CAPSULE ORAL at 09:31

## 2022-01-01 RX ADMIN — ACETAMINOPHEN 650 MG: 325 TABLET ORAL at 23:26

## 2022-01-01 RX ADMIN — HEPARIN SODIUM 5000 UNITS: 5000 INJECTION, SOLUTION INTRAVENOUS; SUBCUTANEOUS at 05:45

## 2022-01-01 RX ADMIN — LOSARTAN POTASSIUM: 50 TABLET, FILM COATED ORAL at 09:31

## 2022-01-01 RX ADMIN — GABAPENTIN 300 MG: 300 CAPSULE ORAL at 20:58

## 2022-01-01 RX ADMIN — Medication 3 MG: at 23:54

## 2022-01-01 RX ADMIN — AMLODIPINE BESYLATE 5 MG: 5 TABLET ORAL at 14:14

## 2022-01-01 RX ADMIN — INSULIN ASPART 6 UNITS: 100 INJECTION, SOLUTION INTRAVENOUS; SUBCUTANEOUS at 17:50

## 2022-01-01 RX ADMIN — GABAPENTIN 300 MG: 300 CAPSULE ORAL at 13:41

## 2022-01-01 RX ADMIN — HEPARIN SODIUM 5000 UNITS: 5000 INJECTION, SOLUTION INTRAVENOUS; SUBCUTANEOUS at 23:55

## 2022-01-01 RX ADMIN — HYDRALAZINE HYDROCHLORIDE 15 MG: 20 INJECTION INTRAMUSCULAR; INTRAVENOUS at 12:54

## 2022-01-01 NOTE — PLAN OF CARE
Problem: Adult Inpatient Plan of Care  Goal: Plan of Care Review  Outcome: Progressing  Flowsheets (Taken 1/1/2022 4433)  Progress: improving  Plan of Care Reviewed With: patient  Outcome Summary:   Patient OOB in bedside chair all shift. Ambulating hallway independently. Urbina draining clear, yellow urine. Post surgical pain mild per pt. 2 BMs, 1 moderate in size. Diet advanced to fulls. BP still elevated   PRN hydralazine given and norvasc added daily.   Plan of Care Review  Plan of Care Reviewed With: patient  Progress: improving  Outcome Summary: Patient OOB in bedside chair all shift. Ambulating hallway independently. Urbina draining clear, yellow urine. Post surgical pain mild per pt. 2 BMs, 1 moderate in size. Diet advanced to fulls. BP still elevated; PRN hydralazine given and norvasc added daily.

## 2022-01-01 NOTE — PROGRESS NOTES
Neurology Daily Progress Note    Subjective    Right arm /hand stronger, still with sensory symptoms, no headache, no urinary problems but has [place] catheter.    Objective    Vital signs in last 24 hours:  Temp:  [36.9 °C (98.4 °F)-37.5 °C (99.5 °F)] 37.2 °C (98.9 °F)  Heart Rate:  [68-76] 70  Resp:  [18-19] 19  BP: (153-188)/() 153/76      Intake/Output Summary (Last 24 hours) at 1/1/2022 1542  Last data filed at 1/1/2022 1348  Gross per 24 hour   Intake 1000 ml   Output 6395 ml   Net -5395 ml     Physical Exam:  awake and alert, speech fluent, follows commands. Cranial nerves- visual fields full,  no facial weakness, tongue midline. Motor-5 over 5 strength arms and legs. Reflexes-symmetrical, no Babinski sign. Sensory- decreased pin sensation right arm C8/T1 distribution. Cerebellar-good finger to nose and heel to shin coordination.  Gen.-appeared in no acute distress, on examination of the heart there was a regular rate and rhythm, on examination of the lungs these were clear to auscultation, on examination of the abdomen this was soft, bowel sounds were decreased, it was nontender, extremities-no edema, no calf tenderness, neck-no bruits, thyroid not enlarged.    Labs  1/1 glucose 133. CBC unremarkable.    Assessment & Plan  Admitted 12/30/21 for laparoscopic low anterior resection of rectal mass. Post op had right-sided arm numbness and tingling hand weakness related to laying on his right side for a prolonged period of time.   12/31 neurology exam-  right wrist extension 4+/5, finger flexion 4/5, hand intrinsics 4/5, decreased light touch in the right forearm and hand.  Felt to have had brachial plexus compression of medial cord or inferior trunk.  Neurologically stable today with improved strength of right hand and arm now back to normal, still with decreased sensation now localized to right C8-T1 distribution.    Continue with current therapy.  Don't see need for further neurological W/U as improving  and would expect to return to baseline over next couple of weeks if not sooner.   OT following.    Ricci Martinez MD  1/1/2022  3:42 PM

## 2022-01-01 NOTE — PROGRESS NOTES
56-year-old male status post robotic low anterior resection 12/30/2021    Complained of some numbness and weakness right hand and arm but able to move fingers freely yesterday.  Much improved today but not resolved completely    Not having much in the way of abdominal pain  Passing flatus and did have a bowel movement  Tolerating liquids with no nausea    Vitals:    01/01/22 0600 01/01/22 0604 01/01/22 0800 01/01/22 1254   BP:  (!) 164/90 (!) 162/98 (!) 188/108   BP Location:  Right upper arm     Patient Position:  Lying     Pulse:  76 70    Resp:  18 19    Temp:   37.2 °C (98.9 °F)    TempSrc:   Oral    SpO2:  97% 96%    Weight: (!) 150 kg (331 lb)      Height:         I/O last 3 completed shifts:  In: 1000 [I.V.:1000]  Out: 8280 [Urine:7775; Drains:505]  Awake, alert, and oriented ×3  Abdomen soft, incisions clear    Results from last 7 days   Lab Units 01/01/22  0633 12/31/21  0502   WBC K/uL 10.94* 11.27*   HEMOGLOBIN g/dL 14.0 13.1*   HEMATOCRIT % 42.1 39.2*   PLATELETS K/uL 194 184     Results from last 7 days   Lab Units 01/01/22  0633 12/31/21  0502   SODIUM mEQ/L 140 137   POTASSIUM mEQ/L 3.5* 3.0*   CHLORIDE mEQ/L 100 102   CO2 mEQ/L 28 26   BUN mg/dL 6* 11   CREATININE mg/dL 0.6* 0.9   CALCIUM mg/dL 9.2 8.4*   GLUCOSE mg/dL 133* 147*       Assessment/plan: Stable exam postoperative day #2 robotic low anterior resection  Low anastomosis, will go slow  Neurology input appreciated  Advancing diet slowly  Will LAURIE Urbina 1/2/2022

## 2022-01-01 NOTE — PROGRESS NOTES
Hospital Medicine Service -  Daily Progress Note       SUBJECTIVE   Interval History: Patient seen and examined, sitting in chair. Continues to have R arm numbness, improved.  Ambulated 4x yesterday.  Denies fever, chills, shortness of breath, dizziness.  No BM yet.  Other ROS-.  All questions answered.  Case d/w RN, wife at bedside and records reviewed.     OBJECTIVE      Vital signs in last 24 hours:  Temp:  [36.9 °C (98.4 °F)-37.5 °C (99.5 °F)] 37.2 °C (98.9 °F)  Heart Rate:  [68-76] 70  Resp:  [18-19] 19  BP: (157-187)/(87-98) 162/98  Weights (last 5 days)     Date/Time Weight    01/01/22 0600 150 kg (331 lb)    12/31/21 0503 150 kg (330 lb 8 oz)    12/30/21 1827 146 kg (322 lb)    12/30/21 0558 140 kg (309 lb)              Intake/Output Summary (Last 24 hours) at 1/1/2022 0659  Last data filed at 1/1/2022 0600  24 Hour Net Input/Output from 7AM Yesterday   Intake 1000 ml   Output 7445 ml   Net -6445 ml       PHYSICAL EXAMINATION      Physical Exam  No acute distress  HEENT: Atraumatic, normocephalic  CV: Regular rate rhythm, no murmur auscultated  Pulmonary: Lungs clear bilaterally  GI: Abdomen is obese, soft, nontender, nondistended  : No Urbina catheter  Extremities: No peripheral edema  Skin: Skin is warm dry intact  Neuro: Alert oriented x3  Hand  5-5 right upper extremity, 5 out of 5 left upper extremity  Pleasant normal affect   Nursing note and vitals reviewed.     LINES, CATHETERS, DRAINS, AIRWAYS, AND WOUNDS   Lines, Drains, and Airways:  Wounds (agree with documentation and present on admission):  Peripheral IV (Adult) 12/30/21 Posterior;Right Hand (Active)   Number of days: 1       Drain 1 Lateral LLQ 15 Fr. (Active)   Number of days: 1       Urethral Catheter Double-lumen 18 Fr (Active)   Number of days: 1       Surgical Incision Abdomen (Active)   Number of days: 1         Comments:      LABS / IMAGING / TELE      Labs  Lab Results   Component Value Date    WBC 10.94 (H) 01/01/2022    HGB  14.0 01/01/2022    HCT 42.1 01/01/2022    MCV 93.1 01/01/2022     01/01/2022     Lab Results   Component Value Date    GLUCOSE 133 (H) 01/01/2022    CALCIUM 9.2 01/01/2022     01/01/2022    K 3.5 (L) 01/01/2022    CO2 28 01/01/2022     01/01/2022    BUN 6 (L) 01/01/2022    CREATININE 0.6 (L) 01/01/2022       Microbiology Results     None          Imaging    MRI PELVIS WITH AND WITHOUT CONTRAST  Narrative: CLINICAL HISTORY: Rectal cancer staging.    TECHNIQUE:  Magnet: 1.5 T  Coil: Body  Sequences/Planes: Axial sagittal oblique  Contrast: 13.5 cc Gadavist    COMPARISON: CT scan of the chest, abdomen, and pelvis performed 12/10/2021    COMMENT:  MRI of the pelvis, with and without contrast, was performed. .    Image quality was satisfactory.    TUMOR LOCATION:  Tumor location (from anal verge):  mid (5.1-10.0 cm)  Distance of lowest extent to top of internal anal sphincter (IAS): 45 mm.  Distance of lowest extent to anal verge: 100 mm.  Relationship to anterior peritoneal reflection: Above.    TUMOR CHARACTERISTICS:  Circumferential extent/location -the tumor is anteriorly based involving 33% of  the circumference of the lumen.  It is mainly endoluminal and appears to be  based on a central stalk.  Craniocaudal extent: 30 mm.  Wall invasion: Small focal wall invasion along the right anterior aspect of the  lesion  Mucinous: No.    CATEGORY:  For rectal tumors above 5 cm:    T2    DISTANCE TO THE MESORECTAL FASCIA (MRF) AND EXTRAMURAL DEPTH OF INVASION (EMD)  Shortest distance of definitive tumor border to the EMF: not applicable  Extramural depth of invasion (EMD) at this level: n/a  Additional tumor spiculations closer to the MRF:  No tumor spiculations.  Component of tumor (any T1-3) closer to the MRF: No    EXTRAMURAL VASCULAR INVASION: (EMVI):  No    OTHER STRUCTURES/ORGANS INVOLVED: No invasion of adjacent organ    MESORECTAL LYMPH NODES AND TUMOR DEPOSITS: None    EXTRAMESORECTAL LYMPH  NODES:  Extramesorectal lymph nodes with suspicious morphology or signal: No    ADDITIONAL FINDINGS: Interval decreased signal in the left peripheral zone of  the prostate gland.  This may be related to prior bout of prostatitis.  No  restricted diffusion.  Impression: IMPRESSION:  T2N0 mid rectal tumor.      ECG/Telemetry  Reviewed by me.    ASSESSMENT AND PLAN      Right arm numbness  Assessment & Plan  Right arm numbness s/p 10-hour rectal surgery  Improving  Seen by neuro  Recommended for OT    Primary hypertension  Assessment & Plan  Continue home meds  Hydralazine as needed.  Increased dosing as Bps remain elevated    Type 2 diabetes mellitus without complication, without long-term current use of insulin (CMS/[facility])  Assessment & Plan  Sliding scale insulin    * Rectal mass  Assessment & Plan  S/p laparoscopic low anterior resection of his rectal mass  Patient is tolerating diet  No BM yet   vital signs stable  Management per surgery including DVT prophylaxis and pain management         VTE Assessment: Padua    VTE Prophylaxis:  Current anticoagulants:  heparin (porcine) 5,000 unit/mL injection 5,000 Units, subcutaneous, q8h TANYA      Code Status: Full Code      Estimated Discharge Date: 1/3/2022     Disposition Planning: Per primary team     Luciana Masters,   1/1/2022

## 2022-01-02 PROBLEM — R50.9 FEVER: Status: ACTIVE | Noted: 2022-01-02

## 2022-01-02 LAB
ANION GAP SERPL CALC-SCNC: 10 MEQ/L (ref 3–15)
BACTERIA URNS QL MICRO: ABNORMAL /HPF
BILIRUB UR QL STRIP.AUTO: NEGATIVE MG/DL
BUN SERPL-MCNC: 8 MG/DL (ref 8–20)
CALCIUM SERPL-MCNC: 9.3 MG/DL (ref 8.9–10.3)
CHLORIDE SERPL-SCNC: 100 MEQ/L (ref 98–109)
CLARITY UR REFRACT.AUTO: CLEAR
CO2 SERPL-SCNC: 27 MEQ/L (ref 22–32)
COLOR UR AUTO: YELLOW
CREAT SERPL-MCNC: 0.7 MG/DL (ref 0.8–1.3)
ERYTHROCYTE [DISTWIDTH] IN BLOOD BY AUTOMATED COUNT: 12.8 % (ref 11.6–14.4)
GFR SERPL CREATININE-BSD FRML MDRD: >60 ML/MIN/1.73M*2
GLUCOSE BLD-MCNC: 166 MG/DL (ref 70–99)
GLUCOSE BLD-MCNC: 167 MG/DL (ref 70–99)
GLUCOSE BLD-MCNC: 188 MG/DL (ref 70–99)
GLUCOSE BLD-MCNC: 211 MG/DL (ref 70–99)
GLUCOSE SERPL-MCNC: 153 MG/DL (ref 70–99)
GLUCOSE UR STRIP.AUTO-MCNC: NEGATIVE MG/DL
HCT VFR BLDCO AUTO: 39.5 % (ref 40.1–51)
HGB BLD-MCNC: 13.3 G/DL (ref 13.7–17.5)
HGB UR QL STRIP.AUTO: 2
HYALINE CASTS #/AREA URNS LPF: ABNORMAL /LPF
KETONES UR STRIP.AUTO-MCNC: 2 MG/DL
LEUKOCYTE ESTERASE UR QL STRIP.AUTO: ABNORMAL
MAGNESIUM SERPL-MCNC: 1.8 MG/DL (ref 1.8–2.5)
MCH RBC QN AUTO: 31.2 PG (ref 28–33.2)
MCHC RBC AUTO-ENTMCNC: 33.7 G/DL (ref 32.2–36.5)
MCV RBC AUTO: 92.7 FL (ref 83–98)
NITRITE UR QL STRIP.AUTO: NEGATIVE
PDW BLD AUTO: 11.1 FL (ref 9.4–12.4)
PH UR STRIP.AUTO: 6 [PH]
PLATELET # BLD AUTO: 168 K/UL (ref 150–350)
POCT TEST: ABNORMAL
POTASSIUM SERPL-SCNC: 3.3 MEQ/L (ref 3.6–5.1)
PROT UR QL STRIP.AUTO: ABNORMAL
RBC # BLD AUTO: 4.26 M/UL (ref 4.5–5.8)
RBC #/AREA URNS HPF: ABNORMAL /HPF
SODIUM SERPL-SCNC: 137 MEQ/L (ref 136–144)
SP GR UR REFRACT.AUTO: 1.01
SQUAMOUS URNS QL MICRO: 1 /HPF
UROBILINOGEN UR STRIP-ACNC: 0.2 EU/DL
WBC # BLD AUTO: 15.04 K/UL (ref 3.8–10.5)
WBC #/AREA URNS HPF: ABNORMAL /HPF

## 2022-01-02 PROCEDURE — 36415 COLL VENOUS BLD VENIPUNCTURE: CPT | Performed by: SURGERY

## 2022-01-02 PROCEDURE — 81003 URINALYSIS AUTO W/O SCOPE: CPT | Performed by: STUDENT IN AN ORGANIZED HEALTH CARE EDUCATION/TRAINING PROGRAM

## 2022-01-02 PROCEDURE — 83735 ASSAY OF MAGNESIUM: CPT | Performed by: SURGERY

## 2022-01-02 PROCEDURE — 87086 URINE CULTURE/COLONY COUNT: CPT | Performed by: HOSPITALIST

## 2022-01-02 PROCEDURE — 12000000 HC ROOM AND CARE MED/SURG

## 2022-01-02 PROCEDURE — 63700000 HC SELF-ADMINISTRABLE DRUG: Performed by: STUDENT IN AN ORGANIZED HEALTH CARE EDUCATION/TRAINING PROGRAM

## 2022-01-02 PROCEDURE — 99233 SBSQ HOSP IP/OBS HIGH 50: CPT | Performed by: STUDENT IN AN ORGANIZED HEALTH CARE EDUCATION/TRAINING PROGRAM

## 2022-01-02 PROCEDURE — 80048 BASIC METABOLIC PNL TOTAL CA: CPT | Performed by: SURGERY

## 2022-01-02 PROCEDURE — 99024 POSTOP FOLLOW-UP VISIT: CPT | Performed by: SURGERY

## 2022-01-02 PROCEDURE — 63600000 HC DRUGS/DETAIL CODE: Performed by: SURGERY

## 2022-01-02 PROCEDURE — 87040 BLOOD CULTURE FOR BACTERIA: CPT | Performed by: STUDENT IN AN ORGANIZED HEALTH CARE EDUCATION/TRAINING PROGRAM

## 2022-01-02 PROCEDURE — 85027 COMPLETE CBC AUTOMATED: CPT | Performed by: SURGERY

## 2022-01-02 PROCEDURE — 63700000 HC SELF-ADMINISTRABLE DRUG: Performed by: SURGERY

## 2022-01-02 RX ORDER — AMLODIPINE BESYLATE 10 MG/1
10 TABLET ORAL DAILY
Status: DISCONTINUED | OUTPATIENT
Start: 2022-01-02 | End: 2022-01-05 | Stop reason: HOSPADM

## 2022-01-02 RX ADMIN — LOSARTAN POTASSIUM: 50 TABLET, FILM COATED ORAL at 08:28

## 2022-01-02 RX ADMIN — AMLODIPINE BESYLATE 10 MG: 10 TABLET ORAL at 08:28

## 2022-01-02 RX ADMIN — GABAPENTIN 300 MG: 300 CAPSULE ORAL at 14:58

## 2022-01-02 RX ADMIN — HEPARIN SODIUM 5000 UNITS: 5000 INJECTION, SOLUTION INTRAVENOUS; SUBCUTANEOUS at 06:48

## 2022-01-02 RX ADMIN — HEPARIN SODIUM 5000 UNITS: 5000 INJECTION, SOLUTION INTRAVENOUS; SUBCUTANEOUS at 14:58

## 2022-01-02 RX ADMIN — HEPARIN SODIUM 5000 UNITS: 5000 INJECTION, SOLUTION INTRAVENOUS; SUBCUTANEOUS at 22:02

## 2022-01-02 RX ADMIN — OXYCODONE HYDROCHLORIDE 5 MG: 5 TABLET ORAL at 10:29

## 2022-01-02 RX ADMIN — ACETAMINOPHEN 650 MG: 325 TABLET ORAL at 20:28

## 2022-01-02 RX ADMIN — GABAPENTIN 300 MG: 300 CAPSULE ORAL at 20:26

## 2022-01-02 RX ADMIN — GABAPENTIN 300 MG: 300 CAPSULE ORAL at 08:28

## 2022-01-02 RX ADMIN — INSULIN ASPART 8 UNITS: 100 INJECTION, SOLUTION INTRAVENOUS; SUBCUTANEOUS at 17:38

## 2022-01-02 NOTE — ASSESSMENT & PLAN NOTE
Patient without fever overnight  No respiratory symptoms (no cough or congestion)  No urinary symptoms  No new joint symptoms  Abdomen feels better    WBC improved  He is postop day 5, Urbina removed over the weekend  Blood cultures and urine cultures thus remain far negative  Has been transitioned to oral antibiotics

## 2022-01-02 NOTE — PROGRESS NOTES
56-year-old male status post robotic low anterior resection 12/30/2021    Complained of some numbness and weakness right hand and arm but able to move fingers freely yesterday.  Continue daily improvement today but not resolved completely    Not having much in the way of abdominal pain  Passing flatus and did have a bowel movement  Tolerated a regular breakfast    Vitals:    01/01/22 1800 01/01/22 2000 01/01/22 2326 01/02/22 0800   BP: (!) 145/73 (!) 182/99 (!) 181/97 (!) 167/83   BP Location:    Left upper arm   Patient Position:   Lying Sitting   Pulse:  88 90 70   Resp:  17 18 18   Temp:  37.7 °C (99.9 °F) (!) 38.4 °C (101.2 °F) 37.1 °C (98.8 °F)   TempSrc:  Oral Oral Oral   SpO2:  100% 100% 98%   Weight:       Height:         I/O last 3 completed shifts:  In: 1000 [I.V.:1000]  Out: 8620 [Urine:8475; Drains:145]  Awake, alert, and oriented ×3  Abdomen soft, incisions clear    Results from last 7 days   Lab Units 01/02/22  0432 01/01/22  0633 12/31/21  0502   WBC K/uL 15.04* 10.94* 11.27*   HEMOGLOBIN g/dL 13.3* 14.0 13.1*   HEMATOCRIT % 39.5* 42.1 39.2*   PLATELETS K/uL 168 194 184     Results from last 7 days   Lab Units 01/02/22  0431 01/01/22  0633 12/31/21  0502   SODIUM mEQ/L 137 140 137   POTASSIUM mEQ/L 3.3* 3.5* 3.0*   CHLORIDE mEQ/L 100 100 102   CO2 mEQ/L 27 28 26   BUN mg/dL 8 6* 11   CREATININE mg/dL 0.7* 0.6* 0.9   CALCIUM mg/dL 9.3 9.2 8.4*   GLUCOSE mg/dL 153* 133* 147*       Assessment/plan: Stable exam postoperative day #3 robotic low anterior resection  Low anastomosis, will go slow  Neurology input appreciated  LAURIE Uribna

## 2022-01-02 NOTE — PROGRESS NOTES
Hospital Medicine Service -  Daily Progress Note       SUBJECTIVE   Interval History: Patient seen and examined, sitting in chair.  States that he feels tired.  Febrile overnight with increased white count on postop day 3.  Denies shortness of breath, dizziness.  Diarrhea this a.m. other ROS-.  All questions answered.  Case d/w RN.  Records reviewed.     OBJECTIVE      Vital signs in last 24 hours:  Temp:  [36.4 °C (97.6 °F)-38.4 °C (101.2 °F)] 37.1 °C (98.8 °F)  Heart Rate:  [70-90] 70  Resp:  [16-18] 18  BP: (145-182)/(73-99) 167/83  Weights (last 5 days)     Date/Time Weight    01/01/22 0600 150 kg (331 lb)    12/31/21 0503 150 kg (330 lb 8 oz)    12/30/21 1827 146 kg (322 lb)    12/30/21 0558 140 kg (309 lb)              Intake/Output Summary (Last 24 hours) at 1/2/2022 0659  Last data filed at 1/2/2022 0400  24 Hour Net Input/Output from 7AM Yesterday   Intake --   Output 5755 ml   Net -5755 ml       PHYSICAL EXAMINATION      Physical Exam  More tired appearing today  HEENT: Atraumatic, normocephalic  CV: Regular rate rhythm, no murmur auscultated  Pulmonary: Lungs clear bilaterally  GI: Abdomen is obese, soft, nontender, nondistended  : No Urbina catheter  Extremities: No peripheral edema  Skin: Skin is warm dry intact  Neuro: Alert oriented x3  Hand  5-5 right upper extremity, 5 out of 5 left upper extremity  Pleasant normal affect   Nursing note and vitals reviewed.     LINES, CATHETERS, DRAINS, AIRWAYS, AND WOUNDS   Lines, Drains, and Airways:  Wounds (agree with documentation and present on admission):  Peripheral IV (Adult) 12/30/21 Posterior;Right Hand (Active)   Number of days: 1       Drain 1 Lateral LLQ 15 Fr. (Active)   Number of days: 1       Urethral Catheter Double-lumen 18 Fr (Active)   Number of days: 1       Surgical Incision Abdomen (Active)   Number of days: 1         Comments:      LABS / IMAGING / TELE      Labs  Lab Results   Component Value Date    WBC 15.04 (H) 01/02/2022    HGB  13.3 (L) 01/02/2022    HCT 39.5 (L) 01/02/2022    MCV 92.7 01/02/2022     01/02/2022     Lab Results   Component Value Date    GLUCOSE 153 (H) 01/02/2022    CALCIUM 9.3 01/02/2022     01/02/2022    K 3.3 (L) 01/02/2022    CO2 27 01/02/2022     01/02/2022    BUN 8 01/02/2022    CREATININE 0.7 (L) 01/02/2022       Microbiology Results     None          Imaging    MRI PELVIS WITH AND WITHOUT CONTRAST  Narrative: CLINICAL HISTORY: Rectal cancer staging.    TECHNIQUE:  Magnet: 1.5 T  Coil: Body  Sequences/Planes: Axial sagittal oblique  Contrast: 13.5 cc Gadavist    COMPARISON: CT scan of the chest, abdomen, and pelvis performed 12/10/2021    COMMENT:  MRI of the pelvis, with and without contrast, was performed. .    Image quality was satisfactory.    TUMOR LOCATION:  Tumor location (from anal verge):  mid (5.1-10.0 cm)  Distance of lowest extent to top of internal anal sphincter (IAS): 45 mm.  Distance of lowest extent to anal verge: 100 mm.  Relationship to anterior peritoneal reflection: Above.    TUMOR CHARACTERISTICS:  Circumferential extent/location -the tumor is anteriorly based involving 33% of  the circumference of the lumen.  It is mainly endoluminal and appears to be  based on a central stalk.  Craniocaudal extent: 30 mm.  Wall invasion: Small focal wall invasion along the right anterior aspect of the  lesion  Mucinous: No.    CATEGORY:  For rectal tumors above 5 cm:    T2    DISTANCE TO THE MESORECTAL FASCIA (MRF) AND EXTRAMURAL DEPTH OF INVASION (EMD)  Shortest distance of definitive tumor border to the EMF: not applicable  Extramural depth of invasion (EMD) at this level: n/a  Additional tumor spiculations closer to the MRF:  No tumor spiculations.  Component of tumor (any T1-3) closer to the MRF: No    EXTRAMURAL VASCULAR INVASION: (EMVI):  No    OTHER STRUCTURES/ORGANS INVOLVED: No invasion of adjacent organ    MESORECTAL LYMPH NODES AND TUMOR DEPOSITS: None    EXTRAMESORECTAL LYMPH  NODES:  Extramesorectal lymph nodes with suspicious morphology or signal: No    ADDITIONAL FINDINGS: Interval decreased signal in the left peripheral zone of  the prostate gland.  This may be related to prior bout of prostatitis.  No  restricted diffusion.  Impression: IMPRESSION:  T2N0 mid rectal tumor.      ECG/Telemetry  Reviewed by me.    ASSESSMENT AND PLAN      Fever  Assessment & Plan  Patient with 1 fever overnight.  WBC increased  Fever now resolved  He is postop day 3, Urbina now removed  Blood cultures and urine cultures drawn  We will monitor need for antibiotics    Right arm numbness  Assessment & Plan  Right arm numbness s/p 10-hour rectal surgery  Improving  Seen by neuro  Recommended for OT    Primary hypertension  Assessment & Plan  Continue home meds  Hydralazine as needed.  Increased dosing as Bps remain elevated    Type 2 diabetes mellitus without complication, without long-term current use of insulin (CMS/[facility])  Assessment & Plan  Sliding scale insulin  Elevated sugars noted.  Sliding scale increase.    * Rectal mass  Assessment & Plan  S/p laparoscopic low anterior resection of his rectal mass  Patient is tolerating diet  No BM yet   vital signs stable  Management per surgery including DVT prophylaxis and pain management         VTE Assessment: Padua    VTE Prophylaxis:  Current anticoagulants:  heparin (porcine) 5,000 unit/mL injection 5,000 Units, subcutaneous, q8h TANYA      Code Status: Full Code      Estimated Discharge Date: 1/3/2022     Disposition Planning: Per primary team     Luciana Masters,   1/2/2022

## 2022-01-03 ENCOUNTER — APPOINTMENT (INPATIENT)
Dept: RADIOLOGY | Facility: HOSPITAL | Age: 57
DRG: 330 | End: 2022-01-03
Attending: SURGERY
Payer: COMMERCIAL

## 2022-01-03 LAB
ANION GAP SERPL CALC-SCNC: 12 MEQ/L (ref 3–15)
BUN SERPL-MCNC: 10 MG/DL (ref 8–20)
CALCIUM SERPL-MCNC: 9.3 MG/DL (ref 8.9–10.3)
CHLORIDE SERPL-SCNC: 94 MEQ/L (ref 98–109)
CO2 SERPL-SCNC: 27 MEQ/L (ref 22–32)
CREAT SERPL-MCNC: 0.9 MG/DL (ref 0.8–1.3)
ERYTHROCYTE [DISTWIDTH] IN BLOOD BY AUTOMATED COUNT: 12.7 % (ref 11.6–14.4)
GFR SERPL CREATININE-BSD FRML MDRD: >60 ML/MIN/1.73M*2
GLUCOSE BLD-MCNC: 169 MG/DL (ref 70–99)
GLUCOSE BLD-MCNC: 178 MG/DL (ref 70–99)
GLUCOSE BLD-MCNC: 182 MG/DL (ref 70–99)
GLUCOSE BLD-MCNC: 193 MG/DL (ref 70–99)
GLUCOSE SERPL-MCNC: 149 MG/DL (ref 70–99)
HCT VFR BLDCO AUTO: 40.4 % (ref 40.1–51)
HGB BLD-MCNC: 13.4 G/DL (ref 13.7–17.5)
MAGNESIUM SERPL-MCNC: 2 MG/DL (ref 1.8–2.5)
MCH RBC QN AUTO: 30.9 PG (ref 28–33.2)
MCHC RBC AUTO-ENTMCNC: 33.2 G/DL (ref 32.2–36.5)
MCV RBC AUTO: 93.1 FL (ref 83–98)
PDW BLD AUTO: 11.7 FL (ref 9.4–12.4)
PLATELET # BLD AUTO: 180 K/UL (ref 150–350)
POCT TEST: ABNORMAL
POTASSIUM SERPL-SCNC: 3.4 MEQ/L (ref 3.6–5.1)
RBC # BLD AUTO: 4.34 M/UL (ref 4.5–5.8)
SODIUM SERPL-SCNC: 133 MEQ/L (ref 136–144)
WBC # BLD AUTO: 19.37 K/UL (ref 3.8–10.5)

## 2022-01-03 PROCEDURE — 63700000 HC SELF-ADMINISTRABLE DRUG: Performed by: SURGERY

## 2022-01-03 PROCEDURE — 36415 COLL VENOUS BLD VENIPUNCTURE: CPT | Performed by: SURGERY

## 2022-01-03 PROCEDURE — 25800000 HC PHARMACY IV SOLUTIONS: Performed by: SURGERY

## 2022-01-03 PROCEDURE — 25000000 HC PHARMACY GENERAL: Performed by: SURGERY

## 2022-01-03 PROCEDURE — 85027 COMPLETE CBC AUTOMATED: CPT | Performed by: SURGERY

## 2022-01-03 PROCEDURE — 83735 ASSAY OF MAGNESIUM: CPT | Performed by: SURGERY

## 2022-01-03 PROCEDURE — 99232 SBSQ HOSP IP/OBS MODERATE 35: CPT | Performed by: HOSPITALIST

## 2022-01-03 PROCEDURE — 12000000 HC ROOM AND CARE MED/SURG

## 2022-01-03 PROCEDURE — 63600000 HC DRUGS/DETAIL CODE: Performed by: SURGERY

## 2022-01-03 PROCEDURE — 63700000 HC SELF-ADMINISTRABLE DRUG: Performed by: STUDENT IN AN ORGANIZED HEALTH CARE EDUCATION/TRAINING PROGRAM

## 2022-01-03 PROCEDURE — 99024 POSTOP FOLLOW-UP VISIT: CPT | Performed by: SURGERY

## 2022-01-03 PROCEDURE — 71046 X-RAY EXAM CHEST 2 VIEWS: CPT

## 2022-01-03 PROCEDURE — 80048 BASIC METABOLIC PNL TOTAL CA: CPT | Performed by: SURGERY

## 2022-01-03 RX ORDER — METRONIDAZOLE 500 MG/100ML
500 INJECTION, SOLUTION INTRAVENOUS
Status: COMPLETED | OUTPATIENT
Start: 2022-01-03 | End: 2022-01-03

## 2022-01-03 RX ORDER — METRONIDAZOLE 500 MG/1
500 TABLET ORAL 3 TIMES DAILY
Status: DISCONTINUED | OUTPATIENT
Start: 2022-01-03 | End: 2022-01-05 | Stop reason: HOSPADM

## 2022-01-03 RX ADMIN — CEFTRIAXONE SODIUM 1 G: 1 INJECTION, POWDER, FOR SOLUTION INTRAMUSCULAR; INTRAVENOUS at 22:29

## 2022-01-03 RX ADMIN — CEFTRIAXONE SODIUM 1 G: 1 INJECTION, POWDER, FOR SOLUTION INTRAMUSCULAR; INTRAVENOUS at 12:21

## 2022-01-03 RX ADMIN — DOCUSATE SODIUM 100 MG: 100 CAPSULE ORAL at 20:04

## 2022-01-03 RX ADMIN — METRONIDAZOLE 500 MG: 500 TABLET ORAL at 20:03

## 2022-01-03 RX ADMIN — HEPARIN SODIUM 5000 UNITS: 5000 INJECTION, SOLUTION INTRAVENOUS; SUBCUTANEOUS at 22:27

## 2022-01-03 RX ADMIN — DOCUSATE SODIUM 100 MG: 100 CAPSULE ORAL at 08:24

## 2022-01-03 RX ADMIN — GABAPENTIN 300 MG: 300 CAPSULE ORAL at 14:47

## 2022-01-03 RX ADMIN — LOSARTAN POTASSIUM: 50 TABLET, FILM COATED ORAL at 08:24

## 2022-01-03 RX ADMIN — GABAPENTIN 300 MG: 300 CAPSULE ORAL at 20:03

## 2022-01-03 RX ADMIN — METRONIDAZOLE 500 MG: 500 INJECTION, SOLUTION INTRAVENOUS at 11:16

## 2022-01-03 RX ADMIN — GABAPENTIN 300 MG: 300 CAPSULE ORAL at 08:24

## 2022-01-03 RX ADMIN — INSULIN ASPART 8 UNITS: 100 INJECTION, SOLUTION INTRAVENOUS; SUBCUTANEOUS at 12:24

## 2022-01-03 RX ADMIN — HEPARIN SODIUM 5000 UNITS: 5000 INJECTION, SOLUTION INTRAVENOUS; SUBCUTANEOUS at 05:46

## 2022-01-03 RX ADMIN — AMLODIPINE BESYLATE 10 MG: 10 TABLET ORAL at 08:24

## 2022-01-03 RX ADMIN — HEPARIN SODIUM 5000 UNITS: 5000 INJECTION, SOLUTION INTRAVENOUS; SUBCUTANEOUS at 14:48

## 2022-01-03 NOTE — PROGRESS NOTES
"Sitting up in a chair  Tolerated a solid breakfast diet  Denies nausea vomiting  Passing flatus and having bowel movements  Febrile up to 100.5 last night    Visit Vitals  BP (!) 156/88   Pulse 88   Temp 37 °C (98.6 °F) (Oral)   Resp 16   Ht 1.93 m (6' 4\")   Wt (!) 150 kg (331 lb)   SpO2 98%   BMI 40.29 kg/m²         Intake/Output Summary (Last 24 hours) at 1/3/2022 1152  Last data filed at 1/3/2022 0600  Gross per 24 hour   Intake 100 ml   Output 945 ml   Net -845 ml     AAOx3  Abdo-soft, appropriately tender, nondistended, high BMI.  Incisions clean dry and intact.  ADITYA x1 serosanguineous    Results from last 7 days   Lab Units 01/03/22  0413 01/02/22  0432 01/01/22  0633   WBC K/uL 19.37* 15.04* 10.94*   HEMOGLOBIN g/dL 13.4* 13.3* 14.0   HEMATOCRIT % 40.4 39.5* 42.1   PLATELETS K/uL 180 168 194       Results from last 7 days   Lab Units 01/03/22  0413 01/02/22  0431 01/01/22  0633   SODIUM mEQ/L 133* 137 140   POTASSIUM mEQ/L 3.4* 3.3* 3.5*   CHLORIDE mEQ/L 94* 100 100   CO2 mEQ/L 27 27 28   BUN mg/dL 10 8 6*   CREATININE mg/dL 0.9 0.7* 0.6*   CALCIUM mg/dL 9.3 9.3 9.2   GLUCOSE mg/dL 149* 153* 133*       A/P - 56M POD#$s/p Robo LAR    Low fiber diet  Follow-up fever work-up  Will order chest x-ray  We will put on broad-spectrum antibiotics  Serial abdominal exams  DVT prophylaxis  Out of bed and ambulation  Pain control  PT OT  Appreciate medical comanagement  "

## 2022-01-03 NOTE — PATIENT CARE CONFERENCE
Care Progression Rounds Note  Date: 1/3/2022  Time: 11:27 AM     Patient Name: Lake Martin     Medical Record Number: [id number]   YOB: 1965  Sex: Male      Room/Bed: 4412D    Admitting Diagnosis: Rectal mass [K62.89]   Admit Date/Time: 12/30/2021  5:22 AM    Primary Diagnosis: Rectal mass  Principal Problem: Rectal mass    GMLOS: 2.5  Anticipated Discharge Date: 1/4/2022    AM-PAC:  Mobility Score: 19    Discharge Planning:  Current Living Arrangements: home  Concerns to be Addressed: no discharge needs identified,denies needs/concerns at this time  Anticipated Discharge Disposition: home without assistance or services    Barriers to Discharge:  Medical issues not resolved    Comments:  rectal mass surgery, maximo drain, WBC elvated, temp, IV abx,    Participants:  ,dietitian/nutrition services,nursing,physical therapy,social work/services

## 2022-01-03 NOTE — PROGRESS NOTES
Hospital Medicine Service -  Daily Progress Note       SUBJECTIVE   Interval History:    Had a mild temp overnight.  No cough or congestion.  No chest pain or shortness of breath  No nausea or vomiting, abd overall feeling better.  Normal BM this AM  No dysuria or frequency  No new joint pain  No other complaints     OBJECTIVE      Vital signs in last 24 hours:  Temp:  [37 °C (98.6 °F)-38.1 °C (100.5 °F)] 37 °C (98.6 °F)  Heart Rate:  [88-94] 88  Resp:  [16] 16  BP: (155-163)/(85-92) 156/88    Intake/Output Summary (Last 24 hours) at 1/3/2022 1513  Last data filed at 1/3/2022 1400  Gross per 24 hour   Intake 100 ml   Output 325 ml   Net -225 ml       PHYSICAL EXAMINATION      Physical Exam  Constitutional:  Not in acute distress.  HENT: Head: Normocephalic and atraumatic.   Eyes: General: No scleral icterus.     Pupils: Pupils are equal, round, and reactive to light.   Neck:    Vascular: No JVD.   Cardiovascular:    Rate and Rhythm: Normal rate.      No murmurs, gallops, rubs.  Pulmonary:    Effort is normal. No respiratory distress.      Normal breath sounds. No stridor. No wheezing or rales.   Chest:    Chest wall: No tenderness.   Abdominal:  Bowel sounds normal. No distension.      Palpations: Abdomen is soft.      Incisional tenderness. No guarding or rebound. ADITYA drain with serosang fluid.   Musculoskeletal:  No tenderness or deformity. Normal ROM.      Cervical back: Normal range of motion and neck supple.      No peripheral edema  Lymphadenopathy:    Cervical: No cervical adenopathy.   Skin:   General: Skin is warm and dry.   Neurological:  Alert and oriented x 3      Sensory: No sensory deficit.      Motor: No abnormal muscle tone.   Psychiatric:       Behavior: Behavior normal.    LINES, CATHETERS, DRAINS, AIRWAYS, AND WOUNDS   Lines, Drains, and Airways:  Wounds (agree with documentation and present on admission):  Peripheral IV (Adult) 12/30/21 Posterior;Right Hand (Active)   Number of days: 4        Drain 1 Lateral LLQ 15 Fr. (Active)   Number of days: 4       Surgical Incision Abdomen (Active)   Number of days: 4        LABS / IMAGING / TELE      Labs  Cr 0.9, WBC 19.3, Hgb 13/4    SARS-CoV-2 (COVID-19) (no units)   Date/Time Value   12/27/2021 0856 Negative       Imaging  CXR without acute disease in the chest.    ECG/Telemetry  Sinus    ASSESSMENT AND PLAN      * Rectal mass  Assessment & Plan  S/p laparoscopic low anterior resection of his rectal mass  Patient is tolerating diet  No BM yet   vital signs stable  Management per surgery including DVT prophylaxis and pain management      Fever  Assessment & Plan  Patient with 1 fever overnight  No respiratory symptoms (no cough or congestion)  No urinary symptoms  No new joint symptoms  Abdomen feels better    WBC increased today  He is postop day 4, Urbina removed over the weekend  Blood cultures and urine cultures both drawn and pending  Thus remain far negative  Started on broad spec abx     Right arm numbness  Assessment & Plan  Right arm numbness s/p 10-hour rectal surgery  Improving  Seen by neuro  Recommended for OT    Primary hypertension  Assessment & Plan  Continue home meds.  Amlodipine added to regimen.  Hydralazine as needed.    Type 2 diabetes mellitus without complication, without long-term current use of insulin (CMS/[facility])  Assessment & Plan  Sliding scale insulin  Elevated sugars noted.  Sliding scale increase.       VTE Assessment: [place]    VTE Prophylaxis:  Current anticoagulants:  heparin (porcine) 5,000 unit/mL injection 5,000 Units, subcutaneous, q8h TANYA    Code Status: Full Code     Anthony Mcneil MD  1/3/2022

## 2022-01-04 PROBLEM — R20.0 RIGHT ARM NUMBNESS: Status: RESOLVED | Noted: 2021-12-31 | Resolved: 2022-01-04

## 2022-01-04 LAB
ANION GAP SERPL CALC-SCNC: 11 MEQ/L (ref 3–15)
BUN SERPL-MCNC: 15 MG/DL (ref 8–20)
CALCIUM SERPL-MCNC: 9.8 MG/DL (ref 8.9–10.3)
CHLORIDE SERPL-SCNC: 96 MEQ/L (ref 98–109)
CO2 SERPL-SCNC: 26 MEQ/L (ref 22–32)
CREAT SERPL-MCNC: 0.8 MG/DL (ref 0.8–1.3)
ERYTHROCYTE [DISTWIDTH] IN BLOOD BY AUTOMATED COUNT: 12.8 % (ref 11.6–14.4)
GFR SERPL CREATININE-BSD FRML MDRD: >60 ML/MIN/1.73M*2
GLUCOSE BLD-MCNC: 162 MG/DL (ref 70–99)
GLUCOSE BLD-MCNC: 169 MG/DL (ref 70–99)
GLUCOSE BLD-MCNC: 191 MG/DL (ref 70–99)
GLUCOSE BLD-MCNC: 208 MG/DL (ref 70–99)
GLUCOSE SERPL-MCNC: 148 MG/DL (ref 70–99)
HCT VFR BLDCO AUTO: 36.2 % (ref 40.1–51)
HGB BLD-MCNC: 12.1 G/DL (ref 13.7–17.5)
MAGNESIUM SERPL-MCNC: 2.1 MG/DL (ref 1.8–2.5)
MCH RBC QN AUTO: 30.6 PG (ref 28–33.2)
MCHC RBC AUTO-ENTMCNC: 33.4 G/DL (ref 32.2–36.5)
MCV RBC AUTO: 91.4 FL (ref 83–98)
PDW BLD AUTO: 11.4 FL (ref 9.4–12.4)
PLATELET # BLD AUTO: 203 K/UL (ref 150–350)
POCT TEST: ABNORMAL
POTASSIUM SERPL-SCNC: 2.8 MEQ/L (ref 3.6–5.1)
RBC # BLD AUTO: 3.96 M/UL (ref 4.5–5.8)
SODIUM SERPL-SCNC: 133 MEQ/L (ref 136–144)
WBC # BLD AUTO: 14.74 K/UL (ref 3.8–10.5)

## 2022-01-04 PROCEDURE — 36415 COLL VENOUS BLD VENIPUNCTURE: CPT | Performed by: SURGERY

## 2022-01-04 PROCEDURE — 85027 COMPLETE CBC AUTOMATED: CPT | Performed by: SURGERY

## 2022-01-04 PROCEDURE — 99232 SBSQ HOSP IP/OBS MODERATE 35: CPT | Performed by: HOSPITALIST

## 2022-01-04 PROCEDURE — 97116 GAIT TRAINING THERAPY: CPT | Mod: GP

## 2022-01-04 PROCEDURE — 12000000 HC ROOM AND CARE MED/SURG

## 2022-01-04 PROCEDURE — 80048 BASIC METABOLIC PNL TOTAL CA: CPT | Performed by: SURGERY

## 2022-01-04 PROCEDURE — 97535 SELF CARE MNGMENT TRAINING: CPT | Mod: GO

## 2022-01-04 PROCEDURE — 63700000 HC SELF-ADMINISTRABLE DRUG: Performed by: SURGERY

## 2022-01-04 PROCEDURE — 99024 POSTOP FOLLOW-UP VISIT: CPT | Performed by: SURGERY

## 2022-01-04 PROCEDURE — 63700000 HC SELF-ADMINISTRABLE DRUG: Performed by: STUDENT IN AN ORGANIZED HEALTH CARE EDUCATION/TRAINING PROGRAM

## 2022-01-04 PROCEDURE — 63600000 HC DRUGS/DETAIL CODE: Performed by: SURGERY

## 2022-01-04 PROCEDURE — 25800000 HC PHARMACY IV SOLUTIONS: Performed by: SURGERY

## 2022-01-04 PROCEDURE — 83735 ASSAY OF MAGNESIUM: CPT | Performed by: SURGERY

## 2022-01-04 RX ORDER — METFORMIN HYDROCHLORIDE 500 MG/1
1000 TABLET ORAL 2 TIMES DAILY WITH MEALS
Status: DISCONTINUED | OUTPATIENT
Start: 2022-01-04 | End: 2022-01-05 | Stop reason: HOSPADM

## 2022-01-04 RX ORDER — POTASSIUM CHLORIDE 20 MEQ/1
40 TABLET, EXTENDED RELEASE ORAL 2 TIMES DAILY
Status: DISPENSED | OUTPATIENT
Start: 2022-01-04 | End: 2022-01-05

## 2022-01-04 RX ORDER — CEFUROXIME AXETIL 250 MG/1
500 TABLET ORAL EVERY 12 HOURS
Status: DISCONTINUED | OUTPATIENT
Start: 2022-01-04 | End: 2022-01-05 | Stop reason: HOSPADM

## 2022-01-04 RX ADMIN — GABAPENTIN 300 MG: 300 CAPSULE ORAL at 13:49

## 2022-01-04 RX ADMIN — CEFUROXIME AXETIL 500 MG: 250 TABLET ORAL at 21:16

## 2022-01-04 RX ADMIN — DOCUSATE SODIUM 100 MG: 100 CAPSULE ORAL at 09:03

## 2022-01-04 RX ADMIN — METRONIDAZOLE 500 MG: 500 TABLET ORAL at 13:49

## 2022-01-04 RX ADMIN — CEFTRIAXONE SODIUM 1 G: 1 INJECTION, POWDER, FOR SOLUTION INTRAMUSCULAR; INTRAVENOUS at 10:22

## 2022-01-04 RX ADMIN — METRONIDAZOLE 500 MG: 500 TABLET ORAL at 09:02

## 2022-01-04 RX ADMIN — AMLODIPINE BESYLATE 10 MG: 10 TABLET ORAL at 09:02

## 2022-01-04 RX ADMIN — POTASSIUM CHLORIDE 40 MEQ: 1500 TABLET, EXTENDED RELEASE ORAL at 19:43

## 2022-01-04 RX ADMIN — METRONIDAZOLE 500 MG: 500 TABLET ORAL at 19:44

## 2022-01-04 RX ADMIN — GABAPENTIN 300 MG: 300 CAPSULE ORAL at 09:03

## 2022-01-04 RX ADMIN — LOSARTAN POTASSIUM: 50 TABLET, FILM COATED ORAL at 09:02

## 2022-01-04 RX ADMIN — GABAPENTIN 300 MG: 300 CAPSULE ORAL at 19:44

## 2022-01-04 RX ADMIN — DOCUSATE SODIUM 100 MG: 100 CAPSULE ORAL at 19:44

## 2022-01-04 RX ADMIN — METFORMIN HYDROCHLORIDE 1000 MG: 500 TABLET ORAL at 16:36

## 2022-01-04 RX ADMIN — POTASSIUM CHLORIDE 40 MEQ: 1500 TABLET, EXTENDED RELEASE ORAL at 09:03

## 2022-01-04 RX ADMIN — HEPARIN SODIUM 5000 UNITS: 5000 INJECTION, SOLUTION INTRAVENOUS; SUBCUTANEOUS at 05:17

## 2022-01-04 RX ADMIN — HEPARIN SODIUM 5000 UNITS: 5000 INJECTION, SOLUTION INTRAVENOUS; SUBCUTANEOUS at 13:49

## 2022-01-04 RX ADMIN — HEPARIN SODIUM 5000 UNITS: 5000 INJECTION, SOLUTION INTRAVENOUS; SUBCUTANEOUS at 21:16

## 2022-01-04 ASSESSMENT — COGNITIVE AND FUNCTIONAL STATUS - GENERAL
DRESSING REGULAR UPPER BODY CLOTHING: 4 - NONE
MOVING TO AND FROM BED TO CHAIR: 4 - NONE
TOILETING: 4 - NONE
EATING MEALS: 4 - NONE
WALKING IN HOSPITAL ROOM: 4 - NONE
AFFECT: WFL
DRESSING REGULAR LOWER BODY CLOTHING: 3 - A LITTLE
HELP NEEDED FOR PERSONAL GROOMING: 4 - NONE
CLIMB 3 TO 5 STEPS WITH RAILING: 4 - NONE
STANDING UP FROM CHAIR USING ARMS: 4 - NONE
HELP NEEDED FOR BATHING: 4 - NONE
AFFECT: WFL

## 2022-01-04 NOTE — PATIENT CARE CONFERENCE
Care Progression Rounds Note  Date: 1/4/2022  Time: 10:42 AM     Patient Name: Lake Martin     Medical Record Number: [id number]   YOB: 1965  Sex: Male      Room/Bed: 4412D    Admitting Diagnosis: Rectal mass [K62.89]   Admit Date/Time: 12/30/2021  5:22 AM    Primary Diagnosis: Rectal mass  Principal Problem: Rectal mass    GMLOS: 2.5  Anticipated Discharge Date: 1/4/2022    AM-PAC:  Mobility Score: 19    Discharge Planning:  Current Living Arrangements: home  Concerns to be Addressed: no discharge needs identified,denies needs/concerns at this time  Anticipated Discharge Disposition: home without assistance or services    Barriers to Discharge:  None    Comments:  rectal mass surgery, low potassium, cleared by PT, may be for dc home today    Participants:  ,dietitian/nutrition services,nursing,physical therapy,social work/services,occupational therapy

## 2022-01-04 NOTE — PLAN OF CARE
Plan of Care Review  Outcome Summary: Patient slept most of the shift. Call bell within reach. Alarm activated.

## 2022-01-04 NOTE — CONSULTS
Clinical Nutrition Education Note    Clinical Comments:    Provided pt with low fiber diet education via verbal review and printed paperwork. Pt understanding of education and encouraged to call with questions.       Date: 01/04/22  Signature: Anamaria Seaman RD

## 2022-01-04 NOTE — PROGRESS NOTES
"Sitting up in a chair  Tolerating a solid diet  Denies nausea vomiting  Passing flatus and having bowel movements  Afebrile for past 24 hours    Visit Vitals  /66   Pulse 72   Temp 36.6 °C (97.8 °F) (Oral)   Resp 18   Ht 1.93 m (6' 4\")   Wt (!) 143 kg (315 lb)   SpO2 98%   BMI 38.34 kg/m²         Intake/Output Summary (Last 24 hours) at 1/4/2022 1001  Last data filed at 1/4/2022 0900  Gross per 24 hour   Intake --   Output 452 ml   Net -452 ml     AAOx3  Abdo-soft, appropriately tender, nondistended, high BMI.  Incisions clean dry and intact.  ADITYA x1 serosanguineous    Results from last 7 days   Lab Units 01/04/22  0511 01/03/22  0413 01/02/22  0432   WBC K/uL 14.74* 19.37* 15.04*   HEMOGLOBIN g/dL 12.1* 13.4* 13.3*   HEMATOCRIT % 36.2* 40.4 39.5*   PLATELETS K/uL 203 180 168       Results from last 7 days   Lab Units 01/04/22  0511 01/03/22  0413 01/02/22  0431   SODIUM mEQ/L 133* 133* 137   POTASSIUM mEQ/L 2.8* 3.4* 3.3*   CHLORIDE mEQ/L 96* 94* 100   CO2 mEQ/L 26 27 27   BUN mg/dL 15 10 8   CREATININE mg/dL 0.8 0.9 0.7*   CALCIUM mg/dL 9.8 9.3 9.3   GLUCOSE mg/dL 148* 149* 153*       A/P - 56M POD#5s/p Robo LAR    Low fiber diet  Follow-up fever work-up  IV antibiotics--->change to PO later today  Serial abdominal exams  DVT prophylaxis  Out of bed and ambulation  Pain control  PT OT  Appreciate medical comanagement  "

## 2022-01-04 NOTE — PROGRESS NOTES
Hospital Medicine Service -  Daily Progress Note       SUBJECTIVE   Interval History:     Patient feels well  No fevers overnight  No cough or congestion  Abd feels better. Eating/drinking. Moving bowels without issue.  No dysuria or frequency.  No chest pain or shortness of breath.  No other new complaints     OBJECTIVE      Vital signs in last 24 hours:  Temp:  [36.6 °C (97.8 °F)-37.4 °C (99.4 °F)] 36.9 °C (98.4 °F)  Heart Rate:  [70-77] 71  Resp:  [16-18] 18  BP: (124-155)/(66-74) 138/74    Intake/Output Summary (Last 24 hours) at 1/4/2022 1802  Last data filed at 1/4/2022 1400  Gross per 24 hour   Intake --   Output 1302 ml   Net -1302 ml       PHYSICAL EXAMINATION      Physical Exam  Constitutional:  Not in acute distress.  HENT: Head: Normocephalic and atraumatic.   Eyes: General: No scleral icterus.     Pupils: Pupils are equal, round, and reactive to light.   Neck:    Vascular: No JVD.   Cardiovascular:    Rate and Rhythm: Normal rate.      No murmurs, gallops, rubs.  Pulmonary:    Effort is normal. No respiratory distress.      Normal breath sounds. No stridor. No wheezing or rales.   Chest:    Chest wall: No tenderness.   Abdominal:  Bowel sounds normal. No distension.      Palpations: Abdomen is soft.      Incisional tenderness. No guarding or rebound. ADITYA drain with serosang fluid.   Musculoskeletal:  No tenderness or deformity. Normal ROM.      Cervical back: Normal range of motion and neck supple.      No peripheral edema.   Skin:   General: Skin is warm and dry.   Neurological:  Alert and oriented x 3      Sensory: No sensory deficit.      Motor: No abnormal muscle tone.   Psychiatric:       Behavior: Behavior normal.   LINES, CATHETERS, DRAINS, AIRWAYS, AND WOUNDS   Lines, Drains, and Airways:  Wounds (agree with documentation and present on admission):  Peripheral IV (Adult) 12/30/21 Posterior;Right Hand (Active)   Number of days: 5       Drain 1 Lateral LLQ 15 Fr. (Active)   Number of days: 5        Surgical Incision Abdomen (Active)   Number of days: 5      LABS / IMAGING / TELE      Labs  I have reviewed the patient's pertinent labs.  Significant abnormals are Cr 0.8, WBC 14.7.    SARS-CoV-2 (COVID-19) (no units)   Date/Time Value   12/27/2021 0856 Negative       Imaging  No new imaging since 1/3    ECG/Telemetry  Sinus    ASSESSMENT AND PLAN      * Rectal mass  Assessment & Plan  S/p laparoscopic low anterior resection of his rectal mass  Patient is tolerating diet, bowels moving  Vital signs stable  Management per surgery    Fever  Assessment & Plan  Patient without fever overnight  No respiratory symptoms (no cough or congestion)  No urinary symptoms  No new joint symptoms  Abdomen feels better    WBC improved  He is postop day 5, Urbina removed over the weekend  Blood cultures and urine cultures thus remain far negative  Has been transitioned to oral antibiotics    Primary hypertension  Assessment & Plan  Continue home meds.  Amlodipine added to regimen.  Overall blood pressures improved.    Type 2 diabetes mellitus without complication, without long-term current use of insulin (CMS/[facility])  Assessment & Plan  Usually on metformin.       VTE Assessment: Padua    VTE Prophylaxis:  Current anticoagulants:  heparin (porcine) 5,000 unit/mL injection 5,000 Units, subcutaneous, q8h TANYA    Code Status: Full Code     Anthony Mcneil MD  1/4/2022

## 2022-01-04 NOTE — PROGRESS NOTES
Patient: Lake Martin  Location:  [address]  MRN:  [id number]  Today's date:  1/4/2022  Pt left in bedside recliner chair, with personal items and call bell within reach. VSS. Nurse notified.  Lake is a 56 y.o. male admitted on 12/30/2021 with Rectal mass [K62.89]. Principal problem is Rectal mass.    Past Medical History  Lake has a past medical history of Arthritis, COVID-19 vaccine series completed, Diabetes (CMS/[facility]), Eye injury, Hypertension, Migraine, and Type 2 diabetes mellitus (CMS/[facility]).    History of Present Illness    Patient presents for planned cystoscopy s/p new rectal mass          PT Vitals    Date/Time Pulse SpO2 O2 Therapy BP BP Location BP Method Pt Position [facility]   01/04/22 1009 70 96 % None (Room air) 146/70 Left upper arm Automatic Sitting SA      PT Pain    Date/Time Pain Type Rating: Rest Rating: Activity [facility]   01/04/22 1009 Pain Assessment 0 0 SA          Prior Living Environment      Most Recent Value   Current Living Arrangements home   Living Environment Comment 2STH w/ son and spouse,  1 MAR from garage,  1/2 bathroom on 1st FL,  FF w/ railing to 2nd FL bedroom and stall shower w/o AE/DME        Prior Level of Function      Most Recent Value   Dominant Hand right   Ambulation independent   Transferring independent   Toileting independent   Bathing independent   Dressing independent   Eating independent   Prior Level of Function Comment PTA: independent with ADLs and functional mobility,  ,    Assistive Device Currently Used at Home none           PT Evaluation and Treatment - 01/04/22 1039        PT Time Calculation    Start Time 0955     Stop Time 1010     Time Calculation (min) 15 min        Session Details    Document Type daily treatment/progress note     Mode of Treatment physical therapy        General Information    Patient/Family/Caregiver Comments/Observations RN cleared pt for tx     General Observations of Patient received in bedside  recliner chair, agreeable to tx     Existing Precautions/Restrictions fall        Cognition/Psychosocial    Affect/Mental Status (Cognition) WFL     Orientation Status (Cognition) oriented x 4     Follows Commands (Cognition) WFL     Cognitive Function WFL        Sit to Stand Transfer    [place], Sit to Stand Transfer independent     Verbal Cues safety     Assistive Device none        Stand to Sit Transfer    [place], Stand to Sit Transfer independent     Verbal Cues safety     Assistive Device none        Gait Training    [place], Gait independent     Assistive Device none     Distance in Feet 170 feet     Pattern (Gait) step-through     Comment (Gait/Stairs) Independent with no LOB        Stairs Training    [place], Stairs modified independence     Assistive Device railing     Number of Stairs 8     Ascending Stairs Technique step-over-step     Descending Stairs Technique step-over-step     Comment Mod I utilizing unilateral hand rail        Balance    Balance Assessment sitting static balance;sitting dynamic balance;standing static balance;standing dynamic balance     Static Sitting Balance WFL     Dynamic Sitting Balance WFL     Static Standing Balance WFL     Dynamic Standing Balance WFL        Therapeutic Exercise    Therapeutic Exercise lower extremity        Lower Extremity (Therapeutic Exercise)    Exercise Position/Type standing     General Exercise bilateral;marching while standing     Reps and Sets x10     Comment standing marches x 10; tolerated ex well        AM-PAC (TM) - Mobility (Current Function)    Turning from your back to your side while in a flat bed without using bedrails? 4 - None     Moving from lying on your back to sitting on the side of a flat bed without using bedrails? 4 - None     Moving to and from a bed to a chair? 4 - None     Standing up from a chair using your arms? 4 - None     To walk in a hospital room? 4 - None     Climbing 3-5 steps with a railing? 4 -  None     AM-PAC (TM) Mobility Score 24        Assessment/Plan (PT)    Daily Outcome Statement Pt overall independent with functional mobility. No need for further IP PT services. D/c home.     Rehab Potential good, to achieve stated therapy goals               PT Assessment/Plan      Most Recent Value   PT Recommended Discharge Disposition home at 01/04/2022 1039   Anticipated Equipment Needs at Discharge (PT) none at 01/04/2022 1039   Patient/Family Therapy Goals Statement to go home at 01/04/2022 1039                         PT Goals      Most Recent Value   Bed Mobility Goal 1    Activity/Assistive Device bed mobility activities, all at 12/31/2021 1157   [place] modified independence at 12/31/2021 1157   Time Frame by discharge at 12/31/2021 1157   Progress/Outcome goal ongoing at 12/31/2021 1157   Transfer Goal 1    Activity/Assistive Device sit-to-stand/stand-to-sit at 12/31/2021 1157   [place] independent at 12/31/2021 1157   Time Frame by discharge at 12/31/2021 1157   Progress/Outcome goal ongoing at 12/31/2021 1157   Gait Training Goal 1    Activity/Assistive Device gait (walking locomotion) at 12/31/2021 1157   [place] independent at 12/31/2021 1157   Distance 150 at 12/31/2021 1157   Progress/Outcome goal ongoing at 12/31/2021 1157   Stairs Goal 1    Activity/Assistive Device stairs, all skills at 12/31/2021 1157   [place] modified independence at 12/31/2021 1157   Number of Stairs 6 at 12/31/2021 1157   Time Frame by discharge at 12/31/2021 1157   Progress/Outcome goal ongoing at 12/31/2021 1157

## 2022-01-04 NOTE — PROGRESS NOTES
Patient:  Lake Martin  Location:  [address]  MRN:  [id number]  Today's date:  1/4/2022  Pre- TX- Cleared by RN for therapy  Patient left in bedside recliner chair with call bell and personal items accessible.  Post- Tx- Rn notified    Lake is a 56 y.o. male admitted on 12/30/2021 with Rectal mass [K62.89]. Principal problem is Rectal mass.    Past Medical History  Lake has a past medical history of Arthritis, COVID-19 vaccine series completed, Diabetes (CMS/[facility]), Eye injury, Hypertension, Migraine, and Type 2 diabetes mellitus (CMS/[facility]).    History of Present Illness    Patient presents for planned cystoscopy s/p new rectal mass          OT Vitals    Date/Time Pulse SpO2 BP [facility]   01/04/22 1302 72 97 % 138/74 BLACK      OT Pain    Date/Time Pain Type Rating: Rest Rating: Activity [facility]   01/04/22 1302 Pain Assessment 0 0 BLACK          Prior Living Environment      Most Recent Value   Current Living Arrangements home   Living Environment Comment 2STH w/ son and spouse,  1 MAR from garage,  1/2 bathroom on 1st FL,  FF w/ railing to 2nd FL bedroom and stall shower w/o AE/DME        Prior Level of Function      Most Recent Value   Dominant Hand right   Ambulation independent   Transferring independent   Toileting independent   Bathing independent   Dressing independent   Eating independent   Prior Level of Function Comment PTA: independent with ADLs and functional mobility,  ,    Assistive Device Currently Used at Home none        Occupational Profile      Most Recent Value   Reason for Services/Referral s/p cystocopy   Successful Occupations Independent PTA   Occupational History/Life Experiences    Performance Patterns Active,  works,  drives   Environmental Supports and Barriers Supportive wife will be home at D/C   Patient Goals To walk more,  to go home on Sunday           OT Evaluation and Treatment - 01/04/22 1327        OT Time Calculation    Start Time 1302     Stop  Time 1324     Time Calculation (min) 22 min        Session Details    Document Type daily treatment/progress note     Mode of Treatment occupational therapy        General Information    Patient Profile Reviewed yes     General Observations of Patient pt rec'd sitting in bedside chair     Existing Precautions/Restrictions fall        Cognition/Psychosocial    Affect/Mental Status (Cognition) WFL     Orientation Status (Cognition) oriented x 3     Follows Commands (Cognition) WFL     Cognitive Function WFL        Sit to Stand Transfer    [place], Sit to Stand Transfer independent     Verbal Cues safety     Assistive Device none        Stand to Sit Transfer    [place], Stand to Sit Transfer independent     Assistive Device none        Toilet Transfer    Transfer Technique sit-stand;stand-sit     [place], Toilet Transfer independent        Tub Transfer    Transfer Technique step over, left entry     [place], Tub Transfer independent     Assistive Device grab bars/tub rail        Balance    Static Sitting Balance WFL     Dynamic Sitting Balance WFL     Static Standing Balance WFL     Dynamic Standing Balance WFL        Lower Body Dressing    Comment patient reports that his spouse will assist him with LE dressing due to bending restrictions        AM-PAC (TM) - ADL (Current Function)    Putting on and taking off regular lower body clothing? 3 - A Little     Bathing? 4 - None     Toileting? 4 - None     Putting on/taking off regular upper body clothing? 4 - None     How much help for taking care of personal grooming? 4 - None     Eating meals? 4 - None     AM-PAC (TM) ADL Score 23        Assessment/Plan (OT)    Daily Outcome Statement Patient is indep with transfer and Min A with LE dressing which spouse can assist. Cleared for d/c to home.     Rehab Potential good, to achieve stated therapy goals     Therapy Frequency 5 times/wk     Planned Therapy Interventions activity tolerance training;BADL  "retraining;transfer/mobility retraining               OT Assessment/Plan      Most Recent Value   OT Recommended Discharge Disposition home with assistance at 01/04/2022 1327   Anticipated Equipment Needs At Discharge (OT) none at 01/04/2022 1327   Patient/Family Therapy Goal Statement \"To get back to normal\" at 12/31/2021 1023                         OT Goals      Most Recent Value   Bed Mobility Goal 1    Activity/Assistive Device sit to supine/supine to sit at 12/31/2021 1023   [place] modified independence, verbal cues required at 12/31/2021 1023   Time Frame by discharge at 12/31/2021 1023   Progress/Outcome goal met at 01/04/2022 1327   Transfer Goal 1    Activity/Assistive Device toilet, walker, front-wheeled at 12/31/2021 1023   [place] modified independence, supervision required at 12/31/2021 1023   Time Frame by discharge at 12/31/2021 1023   Strategies/Barriers LRAD at 12/31/2021 1023   Progress/Outcome goal met at 01/04/2022 1327   Transfer Goal 2    Activity/Assistive Device shower, no assistive device, walker, front-wheeled at 12/31/2021 1023   [place] modified independence at 12/31/2021 1023   Time Frame by discharge at 12/31/2021 1023   Strategies/Barriers LRAD at 12/31/2021 1023   Progress/Outcome goal met at 01/04/2022 1327   Dressing Goal 1    Activity/Adaptive Equipment lower body dressing at 12/31/2021 1023   [place] minimum assist (75% or more patient effort) at 12/31/2021 1023   Time Frame by discharge at 12/31/2021 1023   Progress/Outcome goal met at 01/04/2022 1327        "

## 2022-01-05 VITALS
TEMPERATURE: 97.6 F | HEART RATE: 69 BPM | BODY MASS INDEX: 38.14 KG/M2 | DIASTOLIC BLOOD PRESSURE: 72 MMHG | RESPIRATION RATE: 18 BRPM | OXYGEN SATURATION: 100 % | SYSTOLIC BLOOD PRESSURE: 148 MMHG | HEIGHT: 76 IN | WEIGHT: 313.2 LBS

## 2022-01-05 LAB
ANION GAP SERPL CALC-SCNC: 11 MEQ/L (ref 3–15)
BACTERIA UR CULT: NORMAL
BUN SERPL-MCNC: 11 MG/DL (ref 8–20)
CALCIUM SERPL-MCNC: 9.2 MG/DL (ref 8.9–10.3)
CHLORIDE SERPL-SCNC: 99 MEQ/L (ref 98–109)
CO2 SERPL-SCNC: 27 MEQ/L (ref 22–32)
CREAT SERPL-MCNC: 0.7 MG/DL (ref 0.8–1.3)
ERYTHROCYTE [DISTWIDTH] IN BLOOD BY AUTOMATED COUNT: 12.7 % (ref 11.6–14.4)
GFR SERPL CREATININE-BSD FRML MDRD: >60 ML/MIN/1.73M*2
GLUCOSE BLD-MCNC: 176 MG/DL (ref 70–99)
GLUCOSE BLD-MCNC: 206 MG/DL (ref 70–99)
GLUCOSE SERPL-MCNC: 169 MG/DL (ref 70–99)
HCT VFR BLDCO AUTO: 35 % (ref 40.1–51)
HGB BLD-MCNC: 11.9 G/DL (ref 13.7–17.5)
MAGNESIUM SERPL-MCNC: 2 MG/DL (ref 1.8–2.5)
MCH RBC QN AUTO: 31.2 PG (ref 28–33.2)
MCHC RBC AUTO-ENTMCNC: 34 G/DL (ref 32.2–36.5)
MCV RBC AUTO: 91.6 FL (ref 83–98)
PDW BLD AUTO: 11.2 FL (ref 9.4–12.4)
PLATELET # BLD AUTO: 223 K/UL (ref 150–350)
POCT TEST: ABNORMAL
POCT TEST: ABNORMAL
POTASSIUM SERPL-SCNC: 3.4 MEQ/L (ref 3.6–5.1)
RBC # BLD AUTO: 3.82 M/UL (ref 4.5–5.8)
SODIUM SERPL-SCNC: 137 MEQ/L (ref 136–144)
WBC # BLD AUTO: 11.97 K/UL (ref 3.8–10.5)

## 2022-01-05 PROCEDURE — 63700000 HC SELF-ADMINISTRABLE DRUG: Performed by: SURGERY

## 2022-01-05 PROCEDURE — 85027 COMPLETE CBC AUTOMATED: CPT | Performed by: SURGERY

## 2022-01-05 PROCEDURE — 83735 ASSAY OF MAGNESIUM: CPT | Performed by: SURGERY

## 2022-01-05 PROCEDURE — 63700000 HC SELF-ADMINISTRABLE DRUG: Performed by: STUDENT IN AN ORGANIZED HEALTH CARE EDUCATION/TRAINING PROGRAM

## 2022-01-05 PROCEDURE — 63600000 HC DRUGS/DETAIL CODE: Performed by: SURGERY

## 2022-01-05 PROCEDURE — 80048 BASIC METABOLIC PNL TOTAL CA: CPT | Performed by: SURGERY

## 2022-01-05 PROCEDURE — 99024 POSTOP FOLLOW-UP VISIT: CPT | Performed by: SURGERY

## 2022-01-05 PROCEDURE — 36415 COLL VENOUS BLD VENIPUNCTURE: CPT | Performed by: SURGERY

## 2022-01-05 RX ORDER — GABAPENTIN 300 MG/1
300 CAPSULE ORAL 3 TIMES DAILY
Qty: 42 CAPSULE | Refills: 0 | Status: SHIPPED | OUTPATIENT
Start: 2022-01-05 | End: 2022-02-02

## 2022-01-05 RX ORDER — CEFUROXIME AXETIL 250 MG/1
500 TABLET ORAL EVERY 12 HOURS
Qty: 20 TABLET | Refills: 0 | Status: SHIPPED | OUTPATIENT
Start: 2022-01-05 | End: 2022-01-10

## 2022-01-05 RX ORDER — METRONIDAZOLE 500 MG/1
500 TABLET ORAL 3 TIMES DAILY
Qty: 15 TABLET | Refills: 0 | Status: SHIPPED | OUTPATIENT
Start: 2022-01-05 | End: 2022-01-10

## 2022-01-05 RX ORDER — OXYCODONE HYDROCHLORIDE 5 MG/1
5 TABLET ORAL EVERY 6 HOURS PRN
Qty: 15 TABLET | Refills: 0 | Status: SHIPPED | OUTPATIENT
Start: 2022-01-05 | End: 2022-01-10

## 2022-01-05 RX ADMIN — GABAPENTIN 300 MG: 300 CAPSULE ORAL at 08:16

## 2022-01-05 RX ADMIN — DOCUSATE SODIUM 100 MG: 100 CAPSULE ORAL at 08:16

## 2022-01-05 RX ADMIN — CEFUROXIME AXETIL 500 MG: 250 TABLET ORAL at 08:15

## 2022-01-05 RX ADMIN — METRONIDAZOLE 500 MG: 500 TABLET ORAL at 08:15

## 2022-01-05 RX ADMIN — LOSARTAN POTASSIUM: 50 TABLET, FILM COATED ORAL at 08:16

## 2022-01-05 RX ADMIN — METFORMIN HYDROCHLORIDE 1000 MG: 500 TABLET ORAL at 08:16

## 2022-01-05 RX ADMIN — AMLODIPINE BESYLATE 10 MG: 10 TABLET ORAL at 08:15

## 2022-01-05 RX ADMIN — HEPARIN SODIUM 5000 UNITS: 5000 INJECTION, SOLUTION INTRAVENOUS; SUBCUTANEOUS at 05:08

## 2022-01-05 NOTE — NURSING NOTE
Pt d/c to home, wife at bedside to take home. maximo drain removed by dr. Martinez, site c/d/i. IV removed, belongings and avs sent with pt.

## 2022-01-05 NOTE — PATIENT CARE CONFERENCE
Care Progression Rounds Note  Date: 1/5/2022  Time: 10:39 AM     Patient Name: Lake Martin     Medical Record Number: [id number]   YOB: 1965  Sex: Male      Room/Bed: 4412D    Admitting Diagnosis: Rectal mass [K62.89]   Admit Date/Time: 12/30/2021  5:22 AM    Primary Diagnosis: Rectal mass  Principal Problem: Rectal mass    GMLOS: 2.5  Anticipated Discharge Date: 1/5/2022    AM-PAC:  Mobility Score: 24    Discharge Planning:  Current Living Arrangements: home  Concerns to be Addressed: no discharge needs identified,denies needs/concerns at this time  Anticipated Discharge Disposition: home without assistance or services    Barriers to Discharge:  Medical issues not resolved    Comments:  Rectal drain, PO ABT, D/C today    Participants:  ,dietitian/nutrition services,nursing,physical therapy,social work/services,occupational therapy

## 2022-01-05 NOTE — DISCHARGE SUMMARY
Inpatient Discharge Summary    BRIEF OVERVIEW  Admitting Provider: Tiarra Martinez MD  Discharge Provider: Tiarra Martinez MD  Primary Care Physician at Discharge: Leland Fishman [phone]     Admission Date: 12/30/2021     Discharge Date: 1/5/2022    Primary Discharge Diagnosis  Rectal mass    Secondary Discharge Diagnosis      Discharge Disposition  Home   Code Status at Discharge: Full Code    Discharge Medications     Medication List      ASK your doctor about these medications    losartan-hydrochlorothiazide 50-12.5 mg per tablet  Commonly known as: HYZAAR  TAKE 1 TABLET BY MOUTH EVERY DAY     metFORMIN 1,000 mg tablet  Commonly known as: GLUCOPHAGE  TAKE 1 TABLET BY MOUTH TWICE A DAY WITH MEALS     NON FORMULARY MEDICATION REQUEST  nightly. Juice plus vegetable and fruit 1 capsule po nightly            Active Issues Requiring Follow-up  Follow-up in the surgical office 2 weeks from discharge for a postoperative visit    Outpatient Follow-Up  Encounter Information    This patient does not currently have any appointments scheduled.             Test Results Pending at Discharge  Pending Labs     Order Current Status    Pathology Tissue Exam In process    Blood Culture Blood, Venous Preliminary result    Blood Culture Blood, Venous Preliminary result          DETAILS OF HOSPITAL STAY    Presenting Problem/History of Present Illness  Rectal mass [K62.89]      Operative Procedures Performed  Procedure(s):  LOW ANTERIOR RESECTION LAPAROSCOPIC ROBOTIC Robotic Takedown Splenic Flexure, Primary Colorectal anastimosis EEA # 28 Placement of ADITYA drain and Peritoneal Lavage  cystoscopy placement rukhsana ureteral catheters      Hospital Course   is a 56-year-old gentleman who underwent the above said surgery for a rectal mass and was admitted postoperatively to a stepdown floor for further care.  He was started on a clear liquid diet that was slowly advanced to a solid diet which he was able to tolerate.   He had return of bowel function, passing flatus and having bowel movements.  His pain was well controlled.  His U[place] catheter was eventually removed and he was able to void without difficulty.  He did spike a fever to 101.2 and fever work-up was performed which was largely negative.  He was placed on IV antibiotics that were eventually transitioned to oral.  Beyond 2 episodes of fevers he had no further fevers and his white count which was elevated around the fevers came down.  He was eventually discharged to home and instructed to follow-up in the surgical office in 2 weeks time for a postoperative visit.    Discharge Disposition  Disposition: Home   Destination: Home

## 2022-01-05 NOTE — DISCHARGE INSTRUCTIONS
Call the office to make an appointment to see your surgeon 2 weeks from your discharge or sooner if having any problems.    You can restart all your previous home medications, if any.  TALK TO YOUR SURGEON IF YOU ARE TAKING BLOOD THINNERS PRIOR TO RESTARTING THEM.      Wound care:  Clear dressing with gauze can come off 2 days after surgery  You can shower after the dressing is off.  You can expect mild bloody discharge from the wounds, it is normal  Let water run over the steristrips(paper-tapes), they can get wet.  Do not soak wounds in a bath-tub  The steri-strips will fall off on their own or your surgeon will take them off when you see him/her in the office  VERY IMPORTANT: With a dry washcloth pad dry the wounds,  to remove all moisture.    Signs or symptoms to watch out for:  Fever >101  Persistent Nausea/Vomiting which won't go away  Drainage of blood or pus from incisions  Warmth, redness or increasing tenderness around incisions  Increasing abdominal pain  Prevent getting constipated by taking Colace 100mg BID, to ensure at least 1 soft bowel movement a day, narcotic medications you are on can be constipating. You may take any other stool softener that is over the counter instead as well.    DIET: Drink plenty of fluids and eat a low fiber diet    ACTIVITY RESTRICTIONS:    DRIVING: No driving for at least 1-2 weeks and while on prescription pain medication.  STAIRS: No restrictions.  WALKING: No restrictions.  LIFTING: Nothing more than a gallon of milk (10lbs).   SEXUAL: No restrictions.  BATHING: Showers okay. No baths or swimming.  WORK/SCHOOL: No work/school for 2 weeks. May return to light duty after recovery.

## 2022-01-05 NOTE — PLAN OF CARE
Problem: Adult Inpatient Plan of Care  Goal: Plan of Care Review  Flowsheets (Taken 1/5/2022 1244)  Outcome Summary: Called patient's wife, Christi at [phone] to discuss home care services. Patient's wife declined services. She stated her  does not need services. Gave patient's wife my contact number in the event she changed her mind.

## 2022-01-06 ENCOUNTER — TELEPHONE (OUTPATIENT)
Dept: SURGERY | Facility: CLINIC | Age: 57
End: 2022-01-06
Payer: COMMERCIAL

## 2022-01-06 LAB
CASE RPRT: NORMAL
CLINICAL INFO: NORMAL
PATH REPORT.ADDENDUM SPEC: NORMAL
PATH REPORT.FINAL DX SPEC: NORMAL
PATH REPORT.GROSS SPEC: NORMAL
PATHOLOGY SYNOPTIC REPORT: NORMAL

## 2022-01-06 NOTE — TELEPHONE ENCOUNTER
Patient returning Dr call about his test results. Please call.  Dr will contact patient about results tomorrow while in office. Called patient let him know Dr will speak with him during office hours 1/7/22.

## 2022-01-07 ENCOUNTER — DOCUMENTATION (OUTPATIENT)
Dept: SURGERY | Facility: CLINIC | Age: 57
End: 2022-01-07
Payer: COMMERCIAL

## 2022-01-07 LAB
BACTERIA BLD CULT: NORMAL
BACTERIA BLD CULT: NORMAL

## 2022-01-14 ENCOUNTER — OFFICE VISIT (OUTPATIENT)
Dept: SURGERY | Facility: CLINIC | Age: 57
End: 2022-01-14
Payer: COMMERCIAL

## 2022-01-14 VITALS — WEIGHT: 290 LBS | BODY MASS INDEX: 35.31 KG/M2 | HEIGHT: 76 IN

## 2022-01-14 DIAGNOSIS — Z48.89 POSTOPERATIVE VISIT: Primary | ICD-10-CM

## 2022-01-14 PROCEDURE — 99024 POSTOP FOLLOW-UP VISIT: CPT | Performed by: SURGERY

## 2022-01-14 NOTE — PROGRESS NOTES
Patient ID: Lake Martin                              : 1965  MRN: [id number]                                            Visit Date: 2022  Encounter Provider: Tiarra Martinez  Referring Provider: Leland Fishman MD    Subjective   Chief Complaint: Post-op      Lake Martin is a 56 y.o. old male presenting today for his first postoperative visit after undergoing a robotic low anterior resection for a mid to low rectal tumor that was preoperatively staged at T2N0.  Final pathologic stage being PT2N1A with 1 out of 78 lymph nodes with metastatic carcinoma.  The cancer was a moderately differentiated invasive adenocarcinoma measuring around 3.9 cm.  All surgical margins were negative for tumor.  He has been recuperating appropriately since surgery.  He is starting a regular diet without nausea or vomiting and reports good appetite.  He denies any abdominal pain and is on no pain medications.  No fevers or chills.  No urinary symptoms.  He has had some days with really loose bowel movements with some loss of control and other days with normal BMs.  We discussed that this is not unexpected after having surgery for a lower rectal cancer.  He has an upcoming appointment with one of her oncology colleagues to discuss adjuvant therapy.  Review of systems otherwise negative..      Medications:     Current Outpatient Medications:   •  gabapentin 300 mg capsule, Take 1 capsule (300 mg total) by mouth 3 (three) times a day for 14 days., Disp: 42 capsule, Rfl: 0  •  losartan-hydrochlorothiazide (HYZAAR) 50-12.5 mg per tablet, TAKE 1 TABLET BY MOUTH EVERY DAY (Patient taking differently: nightly.  ), Disp: 90 tablet, Rfl: 1  •  metFORMIN 1,000 mg tablet, TAKE 1 TABLET BY MOUTH TWICE A DAY WITH MEALS, Disp: 60 tablet, Rfl: 0  •  NON FORMULARY MEDICATION REQUEST, nightly. Juice plus vegetable and fruit 1 capsule po nightly , Disp: , Rfl:     Past Medical History:  has a past medical history of  "Arthritis, COVID-19 vaccine series completed, Diabetes (CMS/HCC), Eye injury, Hypertension, Migraine, and Type 2 diabetes mellitus (CMS/HCC).He has no past medical history of History of transfusion.    Objective   Vitals:   Visit Vitals  Ht 1.93 m (6' 4\")   Wt 132 kg (290 lb)   BMI 35.30 kg/m²     Physical Exam     AAOx3  Abdo -soft, nontender, nondistended.  Incisions healing well.  Mild erythema around ADITYA site but no evidence of infection.    Assessment/Plan   Problem List Items Addressed This Visit        Other    Postoperative visit - Primary         has done well from his surgery and is recuperating appropriately.  We discussed postoperative restrictions including diet, driving and physical activity restrictions.  We also discussed that his altered bowel habits are not uncommon after the kind of surgery he has undergone.  We emphasized the importance of maintaining adequate hydration.  He does have an upcoming oncology appointment to discuss further treatment.  I will see him again in follow-up in around 5 to 6 weeks.  In the interim he knows to call with any other questions queries or concerns.  My office can help him schedule a port as necessary.       Tiarra Martinez MD        "

## 2022-01-14 NOTE — LETTER
2022     Leland Fishman MD  [address]  [place] [address]    Patient: Lake Martin  YOB: 1965  Date of Visit: 2022      Dear Dr. Fishman:    Thank you for referring Lake Martin to me for evaluation. Below are my notes for this consultation.    If you have questions, please do not hesitate to call me. I look forward to following your patient along with you.         Sincerely,        Tiarra Martinez MD        CC: MD Jhonny Armas MD Robert K Roush, MD Kakade, Tiarra TRONCOSO MD  2022 10:21 AM  Sign when Signing Visit    Patient ID: Lake Martin                              : 1965  MRN: [id number]                                            Visit Date: 2022  Encounter Provider: Tiarra Martinez  Referring Provider: Leland Fishman MD    Subjective   Chief Complaint: Post-op      Lake Martin is a 56 y.o. old male presenting today for his first postoperative visit after undergoing a robotic low anterior resection for a mid to low rectal tumor that was preoperatively staged at T2N0.  Final pathologic stage being PT2N1A with 1 out of 78 lymph nodes with metastatic carcinoma.  The cancer was a moderately differentiated invasive adenocarcinoma measuring around 3.9 cm.  All surgical margins were negative for tumor.  He has been recuperating appropriately since surgery.  He is starting a regular diet without nausea or vomiting and reports good appetite.  He denies any abdominal pain and is on no pain medications.  No fevers or chills.  No urinary symptoms.  He has had some days with really loose bowel movements with some loss of control and other days with normal BMs.  We discussed that this is not unexpected after having surgery for a lower rectal cancer.  He has an upcoming appointment with one of her oncology colleagues to discuss adjuvant therapy.  Review of systems otherwise negative..     "  Medications:     Current Outpatient Medications:   •  gabapentin 300 mg capsule, Take 1 capsule (300 mg total) by mouth 3 (three) times a day for 14 days., Disp: 42 capsule, Rfl: 0  •  losartan-hydrochlorothiazide (HYZAAR) 50-12.5 mg per tablet, TAKE 1 TABLET BY MOUTH EVERY DAY (Patient taking differently: nightly.  ), Disp: 90 tablet, Rfl: 1  •  metFORMIN 1,000 mg tablet, TAKE 1 TABLET BY MOUTH TWICE A DAY WITH MEALS, Disp: 60 tablet, Rfl: 0  •  NON FORMULARY MEDICATION REQUEST, nightly. Juice plus vegetable and fruit 1 capsule po nightly , Disp: , Rfl:     Past Medical History:  has a past medical history of Arthritis, COVID-19 vaccine series completed, Diabetes (CMS/HCC), Eye injury, Hypertension, Migraine, and Type 2 diabetes mellitus (CMS/HCC).He has no past medical history of History of transfusion.    Objective   Vitals:   Visit Vitals  Ht 1.93 m (6' 4\")   Wt 132 kg (290 lb)   BMI 35.30 kg/m²     Physical Exam     AAOx3  Abdo -soft, nontender, nondistended.  Incisions healing well.  Mild erythema around ADITYA site but no evidence of infection.    Assessment/Plan   Problem List Items Addressed This Visit        Other    Postoperative visit - Primary         has done well from his surgery and is recuperating appropriately.  We discussed postoperative restrictions including diet, driving and physical activity restrictions.  We also discussed that his altered bowel habits are not uncommon after the kind of surgery he has undergone.  We emphasized the importance of maintaining adequate hydration.  He does have an upcoming oncology appointment to discuss further treatment.  I will see him again in follow-up in around 5 to 6 weeks.  In the interim he knows to call with any other questions queries or concerns.  My office can help him schedule a port as necessary.       Tiarra Martinez MD                     "

## 2022-01-16 NOTE — ANESTHESIA POSTPROCEDURE EVALUATION
Patient: Lake Martin    Procedure Summary     Date: 12/30/21 Room / Location:  OR 1 / [facility] OR    Anesthesia Start: 0733 Anesthesia Stop: 1619    Procedures:       LOW ANTERIOR RESECTION LAPAROSCOPIC ROBOTIC Robotic Takedown Splenic Flexure, Primary Colorectal anastimosis EEA # 28 Placement of ADITYA drain and Peritoneal Lavage (N/A Abdomen)      cystoscopy placement rukhsana ureteral catheters (Bilateral ) Diagnosis:       Rectal mass      (Rectal mass [K62.89])    Surgeons: Tiarra Martinez MD; Nat Payne MD Responsible Provider: Israel Ryan MD    Anesthesia Type: general ASA Status: 3          Anesthesia Type: general  PACU Vitals  12/30/2021 1612 - 12/30/2021 1712      12/30/2021  1615 12/30/2021  1620 12/30/2021  1630 12/30/2021  1640    BP: 159/84 135/90 150/71 141/77    Temp: 36.3 °C (97.3 °F) -- -- --    Pulse: 88 86 81 78    Resp: 17 15 14 13    SpO2: 98 % 98 % 97 % 98 %              12/30/2021  1650 12/30/2021  1700 12/30/2021  1710       BP: 148/81 142/76 150/79     Temp: -- -- --     Pulse: 79 83 79     Resp: 12 14 11     SpO2: 98 % 99 % 96 %             Anesthesia Post Evaluation    Pain management: adequate  Patient location during evaluation: PACU  Patient participation: complete - patient participated  Level of consciousness: awake and alert  Cardiovascular status: acceptable  Airway Patency: adequate  Respiratory status: acceptable  Hydration status: acceptable  Anesthetic complications: no

## 2022-01-26 ENCOUNTER — HOSPITAL ENCOUNTER (OUTPATIENT)
Dept: CARDIOLOGY | Facility: HOSPITAL | Age: 57
Discharge: HOME | End: 2022-01-26
Attending: INTERNAL MEDICINE | Admitting: INTERNAL MEDICINE
Payer: COMMERCIAL

## 2022-01-26 VITALS
WEIGHT: 285 LBS | SYSTOLIC BLOOD PRESSURE: 194 MMHG | HEART RATE: 78 BPM | RESPIRATION RATE: 19 BRPM | TEMPERATURE: 98.3 F | BODY MASS INDEX: 34.7 KG/M2 | HEIGHT: 76 IN | OXYGEN SATURATION: 97 % | DIASTOLIC BLOOD PRESSURE: 104 MMHG

## 2022-01-26 DIAGNOSIS — C20 MALIGNANT NEOPLASM OF RECTUM (CMS/HCC): ICD-10-CM

## 2022-01-26 LAB
BASOPHILS # BLD: 0.05 K/UL (ref 0.01–0.1)
BASOPHILS NFR BLD: 0.6 %
DIFFERENTIAL METHOD BLD: NORMAL
EOSINOPHIL # BLD: 0.19 K/UL (ref 0.04–0.54)
EOSINOPHIL NFR BLD: 2.3 %
ERYTHROCYTE [DISTWIDTH] IN BLOOD BY AUTOMATED COUNT: 13.2 % (ref 11.6–14.4)
HCT VFR BLDCO AUTO: 42.6 % (ref 40.1–51)
HGB BLD-MCNC: 14.3 G/DL (ref 13.7–17.5)
IMM GRANULOCYTES # BLD AUTO: 0.03 K/UL (ref 0–0.08)
IMM GRANULOCYTES NFR BLD AUTO: 0.4 %
INR PPP: 1
LYMPHOCYTES # BLD: 1.87 K/UL (ref 1.2–3.5)
LYMPHOCYTES NFR BLD: 22.8 %
MCH RBC QN AUTO: 30.2 PG (ref 28–33.2)
MCHC RBC AUTO-ENTMCNC: 33.6 G/DL (ref 32.2–36.5)
MCV RBC AUTO: 90.1 FL (ref 83–98)
MONOCYTES # BLD: 0.48 K/UL (ref 0.3–1)
MONOCYTES NFR BLD: 5.8 %
NEUTROPHILS # BLD: 5.59 K/UL (ref 1.7–7)
NEUTS SEG NFR BLD: 68.1 %
NRBC BLD-RTO: 0 %
PDW BLD AUTO: 10.7 FL (ref 9.4–12.4)
PLATELET # BLD AUTO: 267 K/UL (ref 150–350)
PROTHROMBIN TIME: 13.2 SEC (ref 12.2–14.5)
RBC # BLD AUTO: 4.73 M/UL (ref 4.5–5.8)
WBC # BLD AUTO: 8.21 K/UL (ref 3.8–10.5)

## 2022-01-26 PROCEDURE — 77001 FLUOROGUIDE FOR VEIN DEVICE: CPT

## 2022-01-26 PROCEDURE — 36415 COLL VENOUS BLD VENIPUNCTURE: CPT | Performed by: PHYSICIAN ASSISTANT

## 2022-01-26 PROCEDURE — 85610 PROTHROMBIN TIME: CPT | Performed by: PHYSICIAN ASSISTANT

## 2022-01-26 PROCEDURE — 36100345 IR PORT PLACEMENT

## 2022-01-26 PROCEDURE — 0JH63XZ INSERTION OF TUNNELED VASCULAR ACCESS DEVICE INTO CHEST SUBCUTANEOUS TISSUE AND FASCIA, PERCUTANEOUS APPROACH: ICD-10-PCS | Performed by: INTERNAL MEDICINE

## 2022-01-26 PROCEDURE — 71000001 HC PACU PHASE 1 INITIAL 30MIN

## 2022-01-26 PROCEDURE — C1769 GUIDE WIRE: HCPCS

## 2022-01-26 PROCEDURE — 63600000 HC DRUGS/DETAIL CODE: Performed by: PHYSICIAN ASSISTANT

## 2022-01-26 PROCEDURE — 02H633Z INSERTION OF INFUSION DEVICE INTO RIGHT ATRIUM, PERCUTANEOUS APPROACH: ICD-10-PCS | Performed by: INTERNAL MEDICINE

## 2022-01-26 PROCEDURE — 85025 COMPLETE CBC W/AUTO DIFF WBC: CPT | Performed by: PHYSICIAN ASSISTANT

## 2022-01-26 PROCEDURE — C1788 PORT, INDWELLING, IMP: HCPCS

## 2022-01-26 PROCEDURE — B518ZZA FLUOROSCOPY OF SUPERIOR VENA CAVA, GUIDANCE: ICD-10-PCS | Performed by: INTERNAL MEDICINE

## 2022-01-26 PROCEDURE — 25000000 HC PHARMACY GENERAL: Performed by: INTERNAL MEDICINE

## 2022-01-26 PROCEDURE — 27200000 HC STERILE SUPPLY

## 2022-01-26 PROCEDURE — 63600000 HC DRUGS/DETAIL CODE: Mod: JW | Performed by: INTERNAL MEDICINE

## 2022-01-26 PROCEDURE — 71000011 HC PACU PHASE 1 EA ADDL MIN

## 2022-01-26 PROCEDURE — 76937 US GUIDE VASCULAR ACCESS: CPT

## 2022-01-26 RX ORDER — LIDOCAINE HYDROCHLORIDE 10 MG/ML
INJECTION, SOLUTION INFILTRATION; PERINEURAL
Status: COMPLETED | OUTPATIENT
Start: 2022-01-26 | End: 2022-01-26

## 2022-01-26 RX ORDER — LIDOCAINE HYDROCHLORIDE AND EPINEPHRINE 10; 10 UG/ML; MG/ML
INJECTION, SOLUTION INFILTRATION; PERINEURAL
Status: COMPLETED | OUTPATIENT
Start: 2022-01-26 | End: 2022-01-26

## 2022-01-26 RX ORDER — FENTANYL CITRATE 50 UG/ML
INJECTION, SOLUTION INTRAMUSCULAR; INTRAVENOUS
Status: COMPLETED | OUTPATIENT
Start: 2022-01-26 | End: 2022-01-26

## 2022-01-26 RX ORDER — MIDAZOLAM HYDROCHLORIDE 2 MG/2ML
INJECTION, SOLUTION INTRAMUSCULAR; INTRAVENOUS
Status: COMPLETED | OUTPATIENT
Start: 2022-01-26 | End: 2022-01-26

## 2022-01-26 RX ORDER — HEPARIN 100 UNIT/ML
SYRINGE INTRAVENOUS
Status: COMPLETED | OUTPATIENT
Start: 2022-01-26 | End: 2022-01-26

## 2022-01-26 RX ORDER — CEFAZOLIN SODIUM 2 G/50ML
2 SOLUTION INTRAVENOUS ONCE
Status: COMPLETED | OUTPATIENT
Start: 2022-01-26 | End: 2022-01-26

## 2022-01-26 RX ADMIN — LIDOCAINE HYDROCHLORIDE AND EPINEPHRINE 10 ML: 10; 10 INJECTION, SOLUTION INFILTRATION; PERINEURAL at 09:19

## 2022-01-26 RX ADMIN — CEFAZOLIN SODIUM 2 G: 2 SOLUTION INTRAVENOUS at 08:49

## 2022-01-26 RX ADMIN — LIDOCAINE HYDROCHLORIDE 10 ML: 10 INJECTION, SOLUTION INFILTRATION; PERINEURAL at 09:18

## 2022-01-26 RX ADMIN — FENTANYL CITRATE 50 MCG: 50 INJECTION, SOLUTION INTRAMUSCULAR; INTRAVENOUS at 09:15

## 2022-01-26 RX ADMIN — HEPARIN 400 UNITS: 100 SYRINGE at 09:29

## 2022-01-26 RX ADMIN — MIDAZOLAM HYDROCHLORIDE 1 MG: 1 INJECTION, SOLUTION INTRAMUSCULAR; INTRAVENOUS at 09:15

## 2022-01-26 NOTE — POST-PROCEDURE NOTE
Interventional Radiology Brief Postprocedure Note    Laekpratibha Martin     Attending: Jessica Cormier MD       Diagnosis: Rectal cancer    Description of procedure: Right IJ chest port placement    Contrast: none     Anesthesia:  Regional    Volume of Lidocaine Utilized (ml): 20     Medications: 1mg versed, 50mcg fentanyl, 2 g ancef     Complications: None      Estimated Blood Loss: Estimated Blood Loss: 0-10 ml    Anticoagulation: please hold for 24 hours    Specimens: none    Findings: Successful right chest port placement. Okay for immediate use    1/26/2022 9:42 AM

## 2022-01-26 NOTE — H&P
Inpatient History & Physical     Admitting Diagnosis: Malignant neoplasm of rectum (CMS/HCC) [C20]    HPI  Patient is a 56 y.o. male  With a history of rectal cancer presenting for port placement for planned initiation of chemotherapy Friday 1/28/22. Patient is otherwise feeling well.    Medical History:   Past Medical History:   Diagnosis Date   • Arthritis     knees   • COVID-19 vaccine series completed     pfizer plus booster   • Diabetes (CMS/HCC)    • Eye injury     Right eye pressure injury   • Hypertension    • Migraine     hasnt had one in approx 2 yrs -2019   • Type 2 diabetes mellitus (CMS/HCC)        Surgical History:   Past Surgical History:   Procedure Laterality Date   • COLECTOMY     • COLONOSCOPY  2021       Allergies: Patient has no known allergies.    Current Inpatient Medications   Medication Dose Route Frequency Provider Last Rate Last Admin   • ceFAZolin (ANCEF) IVPB 2 g/50 mL D5W  2 g intravenous Once Alicia Ceja PA C           Social History:   Social History     Socioeconomic History   • Marital status:      Spouse name: Christi    • Number of children: 1   • Years of education: None   • Highest education level: None   Occupational History   • Occupation: Electorical consultant    Tobacco Use   • Smoking status: Never Smoker   • Smokeless tobacco: Never Used   Vaping Use   • Vaping Use: Never used   Substance and Sexual Activity   • Alcohol use: Not Currently   • Drug use: Never   • Sexual activity: Defer   Other Topics Concern   • None   Social History Narrative   • None     Social Determinants of Health     Financial Resource Strain: Not on file   Food Insecurity: Not on file   Transportation Needs: Not on file   Physical Activity: Not on file   Stress: Not on file   Social Connections: Not on file   Intimate Partner Violence: Not on file   Housing Stability: Not on file       Family History:   Family History   Problem Relation Age of Onset   • Other Biological Mother          pacemaker   • Stroke Biological Father    • Diabetes Biological Father    • Heart failure Biological Father    • No Known Problems Biological Sister    • Lung cancer Maternal Grandfather         Smoker 50 years    • Colon cancer Neg Hx    • Rectal cancer Neg Hx        Review of Systems  All other systems reviewed and negative except as noted in the HPI.    Objective     Vital Signs for the last 24 hours:  Temp:  [36.8 °C (98.3 °F)] 36.8 °C (98.3 °F)  BP: (184-195)/(110-133) 184/110      General appearance: alert, appears stated age and cooperative  Chest wall: no tenderness  Abdomen: soft, non-tender; bowel sounds normal; no masses, no organomegaly  Extremities: extremities normal, warm and well-perfused; no cyanosis, clubbing, or edema    Labs   I have reviewed the patient's pertinent labs. Pertinent labs are within normal limits.        Assessment/Plan     Plan for port placement      Code Status: Full Code  Estimated discharge date:

## 2022-01-26 NOTE — POST-PROCEDURE NOTE
Received patient post procedure AAOxs3 with no complaints. Pt tolerated ice water and crackers. Reviewed discharge instructions with patient. Wife called for  to discharge. Will continue to monitor until then. Call bell in reach.

## 2022-01-26 NOTE — DISCHARGE INSTRUCTIONS
Implanted Port Placement    DISCHARGE INSTRUCTIONS  You have had an implanted port placed today in your chest.  You were given conscious sedation; therefore someone must stay with you until the morning. You may not drive for 24 hours.  You received versed and fentanyl during the procedure which may alter a drug test  Keep the dressing dry and in place for 7 days. It may be removed by the chemotherapy nurse for treatment, if needed.  If the area is sore, you may apply ice to the area in 20 minute intervals; alternating 20 minutes ice with a 20 minute break.  If you have pain, you may take Tylenol 650mg by mouth every 6 hours. DO NOT exceed 2000mg in a 24 hour period.  No strenuous activity or heavy lifting for 7 days. Do not submerge the area in water for 7 days (i.e. bath, swimming, hot tubs). You may shower.  You may resume your normal diet.  You may resume your normal medications.     Notify your oncology physician and/or Interventional Radiologist IMMEDIATELY if any of the following occur:  · Fever of 101° F (38 ° C) or chills  · Pain, redness or swelling around the port.      Physician Contact Information   If you have a problem that you believe requires immediate attention, you should go to the Emergency Room, either at [facility] or the closest hospital. If you believe that your problem can safely wait until you speak to a physician, you should contact your doctor or Interventional Radiologist.   It is usually easier to contact your own physician by calling the office, or after hours through the answering service. If you do not know the number, the hospital  can connect you by calling [phone].   If you have concerns that need to be answered by the Interventional Radiologist, you can reach him/ her as follows:   During regular weekday hours (8AM to 5PM), call [phone] and ask the technologist to connect you with the Interventional Radiologist.   During off hours for emergencies call  [phone] and ask the hospital  to contact the Interventional Radiologist on-call.       [facility] strongly recommends that you visit a Primary Care Provider (PCP) regularly. Your PCP can help you implement the recommendations we gave you today, coordinate care among your specialists, as well as make sure you are up to date with wellness exams, immunizations and preventive screenings. Your PCP can also help when you are feeling sick, potentially avoiding the need for urgent care or emergency department visits. For these reasons, it is important that you follow up with your PCP at least annually or more often based upon your medical conditions. If you do not have a PCP, please call [phone] ([phone]) or go to Find a Doctor for help with finding one.

## 2022-02-01 PROBLEM — C20 RECTAL CANCER (CMS/HCC): Status: ACTIVE | Noted: 2021-12-10

## 2022-02-01 RX ORDER — CAPECITABINE 500 MG/1
TABLET, FILM COATED ORAL
COMMUNITY
Start: 2022-01-18 | End: 2022-02-02

## 2022-02-02 ENCOUNTER — OFFICE VISIT (OUTPATIENT)
Dept: PRIMARY CARE | Facility: CLINIC | Age: 57
End: 2022-02-02
Payer: COMMERCIAL

## 2022-02-02 VITALS
SYSTOLIC BLOOD PRESSURE: 162 MMHG | TEMPERATURE: 98 F | RESPIRATION RATE: 16 BRPM | BODY MASS INDEX: 34.7 KG/M2 | HEART RATE: 75 BPM | WEIGHT: 285 LBS | OXYGEN SATURATION: 98 % | DIASTOLIC BLOOD PRESSURE: 90 MMHG | HEIGHT: 76 IN

## 2022-02-02 DIAGNOSIS — E11.69 TYPE 2 DIABETES MELLITUS WITH OTHER SPECIFIED COMPLICATION, WITHOUT LONG-TERM CURRENT USE OF INSULIN: ICD-10-CM

## 2022-02-02 DIAGNOSIS — I10 PRIMARY HYPERTENSION: Primary | ICD-10-CM

## 2022-02-02 DIAGNOSIS — C20 RECTAL CANCER (CMS/HCC): ICD-10-CM

## 2022-02-02 PROBLEM — R50.9 FEVER: Status: RESOLVED | Noted: 2022-01-02 | Resolved: 2022-02-02

## 2022-02-02 PROCEDURE — 3077F SYST BP >= 140 MM HG: CPT | Performed by: FAMILY MEDICINE

## 2022-02-02 PROCEDURE — 3008F BODY MASS INDEX DOCD: CPT | Performed by: FAMILY MEDICINE

## 2022-02-02 PROCEDURE — 3080F DIAST BP >= 90 MM HG: CPT | Performed by: FAMILY MEDICINE

## 2022-02-02 PROCEDURE — 99213 OFFICE O/P EST LOW 20 MIN: CPT | Performed by: FAMILY MEDICINE

## 2022-02-02 ASSESSMENT — ENCOUNTER SYMPTOMS
STRIDOR: 0
ABDOMINAL PAIN: 0
DYSURIA: 0
HEADACHES: 0
PALPITATIONS: 0
HEMATURIA: 0
SHORTNESS OF BREATH: 0
POLYDIPSIA: 0
COUGH: 0
NUMBNESS: 0
BLOOD IN STOOL: 0
FEVER: 0
POLYPHAGIA: 0
WHEEZING: 0
NAUSEA: 0
VOMITING: 0
DIARRHEA: 0
WEAKNESS: 0
CHILLS: 0
DIZZINESS: 0
CONFUSION: 0
CONSTIPATION: 0

## 2022-02-02 NOTE — PROGRESS NOTES
"      Subjective      Patient ID: Lake Martin is a 56 y.o. male.  1965      bp  Bp 142/80 at home yesterday      The following have been reviewed and updated as appropriate in this visit:   Allergies  Meds  Problems       Review of Systems   Constitutional: Negative for chills and fever.   HENT: Negative for dental problem.    Eyes: Negative for visual disturbance.   Respiratory: Negative for cough, shortness of breath, wheezing and stridor.    Cardiovascular: Negative for chest pain, palpitations and leg swelling.   Gastrointestinal: Negative for abdominal pain, blood in stool, constipation, diarrhea, nausea and vomiting.   Endocrine: Negative for cold intolerance, heat intolerance, polydipsia, polyphagia and polyuria.   Genitourinary: Negative for dysuria, hematuria and penile discharge.   Musculoskeletal: Negative for gait problem.   Skin: Negative for rash.   Neurological: Negative for dizziness, syncope, weakness, numbness and headaches.   Psychiatric/Behavioral: Negative for confusion.   All other systems reviewed and are negative.      Objective     Vitals:    02/02/22 0928   BP: (!) 162/90   BP Location: Left upper arm   Patient Position: Sitting   Pulse: 75   Resp: 16   Temp: 36.7 °C (98 °F)   TempSrc: Temporal   SpO2: 98%   Weight: 129 kg (285 lb)   Height: 1.93 m (6' 4\")     Body mass index is 34.69 kg/m².    Physical Exam  Vitals and nursing note reviewed.   Constitutional:       Appearance: Normal appearance.   Eyes:      General: No scleral icterus.  Cardiovascular:      Rate and Rhythm: Normal rate and regular rhythm.      Heart sounds: Normal heart sounds.   Pulmonary:      Effort: Pulmonary effort is normal. No respiratory distress.      Breath sounds: Normal breath sounds. No stridor.   Musculoskeletal:      Right lower leg: No edema.      Left lower leg: No edema.   Skin:     General: Skin is warm and dry.      Findings: No rash.   Neurological:      General: No focal deficit " present.      Mental Status: He is alert.   Psychiatric:         Mood and Affect: Mood normal.         Behavior: Behavior normal.         Assessment/Plan   Diagnoses and all orders for this visit:    Primary hypertension (Primary)  Comments:  monitor at home over next week  consider adding norvasc    Rectal cancer (CMS/HCC)  Comments:  s/p surg  chemo    Type 2 diabetes mellitus with other specified complication, without long-term current use of insulin (CMS/HCC)

## 2022-02-11 RX ORDER — AMLODIPINE BESYLATE 5 MG/1
5 TABLET ORAL DAILY
Qty: 90 TABLET | Refills: 0 | Status: SHIPPED | OUTPATIENT
Start: 2022-02-11 | End: 2022-03-11 | Stop reason: SDUPTHER

## 2022-02-11 RX ORDER — LOSARTAN POTASSIUM AND HYDROCHLOROTHIAZIDE 12.5; 5 MG/1; MG/1
TABLET ORAL
Qty: 180 TABLET | Refills: 0
Start: 2022-02-11 | End: 2022-02-23 | Stop reason: SDUPTHER

## 2022-02-23 RX ORDER — LOSARTAN POTASSIUM AND HYDROCHLOROTHIAZIDE 12.5; 5 MG/1; MG/1
TABLET ORAL
Qty: 180 TABLET | Refills: 0 | Status: SHIPPED | OUTPATIENT
Start: 2022-02-23 | End: 2022-03-23 | Stop reason: SDUPTHER

## 2022-02-25 ENCOUNTER — OFFICE VISIT (OUTPATIENT)
Dept: SURGERY | Facility: CLINIC | Age: 57
End: 2022-02-25
Payer: COMMERCIAL

## 2022-02-25 VITALS — BODY MASS INDEX: 34.46 KG/M2 | HEIGHT: 76 IN | WEIGHT: 283 LBS

## 2022-02-25 DIAGNOSIS — Z48.89 POSTOPERATIVE VISIT: Primary | ICD-10-CM

## 2022-02-25 PROCEDURE — 99024 POSTOP FOLLOW-UP VISIT: CPT | Performed by: SURGERY

## 2022-02-25 NOTE — LETTER
2022     Leland Fishman MD  [address] [facility] [address]  [place] [address]    Patient: Lake Martin  YOB: 1965  Date of Visit: 2022      Dear Dr. Fishman:    Thank you for referring Lake Martin to me for evaluation. Below are my notes for this consultation.    If you have questions, please do not hesitate to call me. I look forward to following your patient along with you.         Sincerely,        Tiarra Martinez MD        CC: Tiarra Porras MD  3/7/2022  2:18 PM  Sign when Signing Visit    Patient ID: Lake Martin                              : 1965  MRN: [id number]                                            Visit Date: 2022  Encounter Provider: Tiarra Martinez  Referring Provider: Leland Fishman MD    Subjective   Chief Complaint: Post-op      Lake Martin is a 56 y.o. old male presenting today for his second postoperative visit.  He underwent a robotic low anterior resection for mid to low rectal cancer preoperatively staged at T2N0 final pathologic stage being PT2N1A with 1 out of 78 lymph nodes positive.  He is currently undergoing adjuvant chemo radiotherapy.  He reports initially he had issues with diarrhea and increased urgency which he reports is slowly improving.  He denies abdominal pain.  He starting a regular diet and has put on a little bit of weight since I last saw him.  Review of systems otherwise negative..      Medications:     Current Outpatient Medications:   •  amLODIPine (NORVASC) 5 mg tablet, Take 1 tablet (5 mg total) by mouth daily., Disp: 90 tablet, Rfl: 0  •  losartan-hydrochlorothiazide (HYZAAR) 50-12.5 mg per tablet, 2 tabs daily, Disp: 180 tablet, Rfl: 0  •  metFORMIN 1,000 mg tablet, TAKE 1 TABLET BY MOUTH TWICE A DAY WITH MEALS, Disp: 180 tablet, Rfl: 0    Past Medical History:  has a past medical history of Arthritis, COVID-19 vaccine series completed, Diabetes (CMS/[facility]),  "Eye injury, Hypertension, Migraine, and Type 2 diabetes mellitus (CMS/HCC).He has no past medical history of History of transfusion.    Objective   Vitals:   Visit Vitals  Ht 1.93 m (6' 4\")   Wt 128 kg (283 lb)   BMI 34.45 kg/m²     Physical Exam     AAOx3  Abdo -soft, nontender, nondistended.  Incisions healing nicely.    Assessment/Plan   Problem List Items Addressed This Visit        Other    Postoperative visit - Primary         is currently going through adjuvant chemoradiotherapy.  I encouraged him to continue with good hydration and start fiber supplementation at this point.  I will see him again in follow-up in around 8 weeks time.  In the interim he knows to call with any other questions queries or concerns.       Tiarra Martinez MD                     "

## 2022-03-07 NOTE — PROGRESS NOTES
"  Patient ID: Lake Martin                              : 1965  MRN: [id number]                                            Visit Date: 2022  Encounter Provider: Tiarra Martinez  Referring Provider: Leland Fishman MD    Subjective   Chief Complaint: Post-op      Lake Martin is a 56 y.o. old male presenting today for his second postoperative visit.  He underwent a robotic low anterior resection for mid to low rectal cancer preoperatively staged at T2N0 final pathologic stage being PT2N1A with 1 out of 78 lymph nodes positive.  He is currently undergoing adjuvant chemo radiotherapy.  He reports initially he had issues with diarrhea and increased urgency which he reports is slowly improving.  He denies abdominal pain.  He starting a regular diet and has put on a little bit of weight since I last saw him.  Review of systems otherwise negative..      Medications:     Current Outpatient Medications:   •  amLODIPine (NORVASC) 5 mg tablet, Take 1 tablet (5 mg total) by mouth daily., Disp: 90 tablet, Rfl: 0  •  losartan-hydrochlorothiazide (HYZAAR) 50-12.5 mg per tablet, 2 tabs daily, Disp: 180 tablet, Rfl: 0  •  metFORMIN 1,000 mg tablet, TAKE 1 TABLET BY MOUTH TWICE A DAY WITH MEALS, Disp: 180 tablet, Rfl: 0    Past Medical History:  has a past medical history of Arthritis, COVID-19 vaccine series completed, Diabetes (CMS/HCC), Eye injury, Hypertension, Migraine, and Type 2 diabetes mellitus (CMS/HCC).He has no past medical history of History of transfusion.    Objective   Vitals:   Visit Vitals  Ht 1.93 m (6' 4\")   Wt 128 kg (283 lb)   BMI 34.45 kg/m²     Physical Exam     AAOx3  Abdo -soft, nontender, nondistended.  Incisions healing nicely.    Assessment/Plan   Problem List Items Addressed This Visit        Other    Postoperative visit - Primary         is currently going through adjuvant chemoradiotherapy.  I encouraged him to continue with good hydration and start fiber " supplementation at this point.  I will see him again in follow-up in around 8 weeks time.  In the interim he knows to call with any other questions queries or concerns.       Tiarra Martinez MD

## 2022-03-11 RX ORDER — AMLODIPINE BESYLATE 5 MG/1
5 TABLET ORAL DAILY
Qty: 90 TABLET | Refills: 0 | Status: SHIPPED | OUTPATIENT
Start: 2022-03-11 | End: 2022-06-06

## 2022-03-11 NOTE — TELEPHONE ENCOUNTER
Medicine Refill Request    Last Office: 2/2/2022   Last Consult Visit: 12/17/2021  Last Telemedicine Visit: Visit date not found    Next Appointment: Visit date not found      Current Outpatient Medications:   •  amLODIPine (NORVASC) 5 mg tablet, Take 1 tablet (5 mg total) by mouth daily., Disp: 90 tablet, Rfl: 0  •  losartan-hydrochlorothiazide (HYZAAR) 50-12.5 mg per tablet, 2 tabs daily, Disp: 180 tablet, Rfl: 0  •  metFORMIN 1,000 mg tablet, TAKE 1 TABLET BY MOUTH TWICE A DAY WITH MEALS, Disp: 180 tablet, Rfl: 0      BP Readings from Last 3 Encounters:   02/02/22 (!) 162/90   01/26/22 (!) 194/104   01/05/22 (!) 148/72       Recent Lab results:  Lab Results   Component Value Date    CHOL 185 03/05/2021   ,   Lab Results   Component Value Date    HDL 48 03/05/2021   ,   Lab Results   Component Value Date    LDLCALC 109 (H) 03/05/2021   ,   Lab Results   Component Value Date    TRIG 161 (H) 03/05/2021        Lab Results   Component Value Date    GLUCOSE 169 (H) 01/05/2022   ,   Lab Results   Component Value Date    HGBA1C 6.7 (H) 03/05/2021         Lab Results   Component Value Date    CREATININE 0.7 (L) 01/05/2022       Lab Results   Component Value Date    TSH 3.52 12/15/2015

## 2022-03-23 RX ORDER — LOSARTAN POTASSIUM AND HYDROCHLOROTHIAZIDE 12.5; 5 MG/1; MG/1
TABLET ORAL
Qty: 180 TABLET | Refills: 3 | Status: SHIPPED | OUTPATIENT
Start: 2022-03-23 | End: 2022-07-05

## 2022-06-10 ENCOUNTER — TELEPHONE (OUTPATIENT)
Dept: PRIMARY CARE | Facility: CLINIC | Age: 57
End: 2022-06-10
Payer: COMMERCIAL

## 2022-06-10 DIAGNOSIS — E11.65 TYPE 2 DIABETES MELLITUS WITH HYPERGLYCEMIA, WITHOUT LONG-TERM CURRENT USE OF INSULIN (CMS/HCC): Primary | ICD-10-CM

## 2022-06-10 RX ORDER — CAPECITABINE 500 MG/1
TABLET, FILM COATED ORAL
COMMUNITY
Start: 2022-06-07 | End: 2023-05-02

## 2022-06-10 NOTE — TELEPHONE ENCOUNTER
Pt called back to state that he was temporarily taken off of metformin to have a CT scan done and the labs were around that time so glucose might not be elevated?      Please reach out to patient.

## 2022-06-17 ENCOUNTER — OFFICE VISIT (OUTPATIENT)
Dept: SURGERY | Facility: CLINIC | Age: 57
End: 2022-06-17
Payer: COMMERCIAL

## 2022-06-17 VITALS — WEIGHT: 280 LBS | HEIGHT: 76 IN | BODY MASS INDEX: 34.1 KG/M2

## 2022-06-17 DIAGNOSIS — C20 RECTAL CANCER (CMS/HCC): Primary | ICD-10-CM

## 2022-06-17 PROCEDURE — 99212 OFFICE O/P EST SF 10 MIN: CPT | Performed by: SURGERY

## 2022-06-17 PROCEDURE — 3008F BODY MASS INDEX DOCD: CPT | Performed by: SURGERY

## 2022-06-17 NOTE — PROGRESS NOTES
Patient ID: Lake Martin                              : 1965  MRN: [id number]                                            Visit Date: 2022  Encounter Provider: Tiarra Martinez  Referring Provider: Leland Fishman MD    Subjective   Chief Complaint: Follow-up      Lake Martin is a 56 y.o. old male presenting today for a surveillance follow-up visit with regards to his rectal cancer for which he had surgery on in 2021.He underwent a robotic low anterior resection for mid to low rectal cancer preoperatively staged at T2N0 final pathologic stage being PT2N1A with 1 out of 78 lymph nodes positive.  He just finished adjuvant chemotherapy and is about to start last cycle of radiation soon.  He has been doing well.  He has had some altered bowel function but has good control.  No nausea vomiting.  Tolerating a regular diet without issues.  He is doing good with hydration and fiber supplementation.  Denies any abdominal pain.  Review of systems otherwise negative..      Medications:     Current Outpatient Medications:   •  amLODIPine (NORVASC) 5 mg tablet, TAKE 1 TABLET (5 MG TOTAL) BY MOUTH DAILY., Disp: 90 tablet, Rfl: 1  •  capecitabine (XELODA) 500 mg chemo tablet, , Disp: , Rfl:   •  losartan-hydrochlorothiazide (HYZAAR) 50-12.5 mg per tablet, 2 tabs daily, Disp: 180 tablet, Rfl: 3  •  metFORMIN 1,000 mg tablet, TAKE 1 TABLET BY MOUTH TWICE A DAY WITH MEALS, Disp: 180 tablet, Rfl: 0    Past Medical History:  has a past medical history of Arthritis, COVID-19 vaccine series completed, Diabetes (CMS/HCC), Eye injury, Hypertension, Migraine, and Type 2 diabetes mellitus (CMS/HCC).    He has no past medical history of History of transfusion.  Past Surgical History:  has a past surgical history that includes Colonoscopy () and Colectomy.  Social History:   Social History     Tobacco Use   • Smoking status: Never Smoker   • Smokeless tobacco: Never Used   Vaping Use   • Vaping  "Use: Never used   Substance Use Topics   • Alcohol use: Not Currently   • Drug use: Never     Family History: family history includes Diabetes in his biological father; Heart failure in his biological father; Lung cancer in his maternal grandfather; No Known Problems in his biological sister; Other in his biological mother; Stroke in his biological father.  Allergies: has No Known Allergies.     Review of Systems  The following have been reviewed and updated as appropriate in this visit:            Objective   Vitals:   Visit Vitals  Ht 1.93 m (6' 4\")   Wt 127 kg (280 lb)   BMI 34.08 kg/m²     Physical Exam  Vitals reviewed.   Constitutional:       Appearance: Normal appearance.   HENT:      Head: Normocephalic and atraumatic.   Eyes:      Conjunctiva/sclera: Conjunctivae normal.   Cardiovascular:      Rate and Rhythm: Normal rate.   Pulmonary:      Effort: Pulmonary effort is normal.   Abdominal:      Comments: Soft, Nontender, nondistended.  Incisions well-healed.   Musculoskeletal:      Cervical back: Neck supple.   Skin:     General: Skin is warm and dry.   Neurological:      General: No focal deficit present.      Mental Status: He is alert and oriented to person, place, and time.   Psychiatric:         Mood and Affect: Mood normal.         Behavior: Behavior normal.              Assessment/Plan   Problem List Items Addressed This Visit        Digestive    Rectal cancer (CMS/HCC) - Primary         continues to do well from a surgical perspective.  I will see him in follow-up again in October.  I want him to continue with good hydration and fiber supplementation.  We will set him up for a surveillance colonoscopy on his visit then.  In the interim he knows to call with any other questions queries or concerns.       Tiarra Martinez MD  "

## 2022-06-17 NOTE — LETTER
2022     Leland Fishman MD  [address] [facility] [address]  [place] [address]    Patient: Lake Martin  YOB: 1965  Date of Visit: 2022      Dear Dr. Fishman:    Thank you for referring Lake Martin to me for evaluation. Below are my notes for this consultation.    If you have questions, please do not hesitate to call me. I look forward to following your patient along with you.         Sincerely,        Tiarra Martinez MD        CC: Tiarra Porras MD  2022  3:28 PM  Sign when Signing Visit  Patient ID: Lake Martin                              : 1965  MRN: [id number]                                            Visit Date: 2022  Encounter Provider: Tiarra Martinez  Referring Provider: Leland Fishman MD    Subjective   Chief Complaint: Follow-up      Lake Martin is a 56 y.o. old male presenting today for a surveillance follow-up visit with regards to his rectal cancer for which he had surgery on in 2021.He underwent a robotic low anterior resection for mid to low rectal cancer preoperatively staged at T2N0 final pathologic stage being PT2N1A with 1 out of 78 lymph nodes positive.  He just finished adjuvant chemotherapy and is about to start last cycle of radiation soon.  He has been doing well.  He has had some altered bowel function but has good control.  No nausea vomiting.  Tolerating a regular diet without issues.  He is doing good with hydration and fiber supplementation.  Denies any abdominal pain.  Review of systems otherwise negative..      Medications:     Current Outpatient Medications:   •  amLODIPine (NORVASC) 5 mg tablet, TAKE 1 TABLET (5 MG TOTAL) BY MOUTH DAILY., Disp: 90 tablet, Rfl: 1  •  capecitabine (XELODA) 500 mg chemo tablet, , Disp: , Rfl:   •  losartan-hydrochlorothiazide (HYZAAR) 50-12.5 mg per tablet, 2 tabs daily, Disp: 180 tablet, Rfl: 3  •  metFORMIN 1,000 mg tablet, TAKE  "1 TABLET BY MOUTH TWICE A DAY WITH MEALS, Disp: 180 tablet, Rfl: 0    Past Medical History:  has a past medical history of Arthritis, COVID-19 vaccine series completed, Diabetes (CMS/[facility]), Eye injury, Hypertension, Migraine, and Type 2 diabetes mellitus (CMS/[facility]).    He has no past medical history of History of transfusion.  Past Surgical History:  has a past surgical history that includes Colonoscopy (2021) and Colectomy.  Social History:   Social History     Tobacco Use   • Smoking status: Never Smoker   • Smokeless tobacco: Never Used   Vaping Use   • Vaping Use: Never used   Substance Use Topics   • Alcohol use: Not Currently   • Drug use: Never     Family History: family history includes Diabetes in his biological father; Heart failure in his biological father; Lung cancer in his maternal grandfather; No Known Problems in his biological sister; Other in his biological mother; Stroke in his biological father.  Allergies: has No Known Allergies.     Review of Systems  The following have been reviewed and updated as appropriate in this visit:            Objective   Vitals:   Visit Vitals  Ht 1.93 m (6' 4\")   Wt 127 kg (280 lb)   BMI 34.08 kg/m²     Physical Exam  Vitals reviewed.   Constitutional:       Appearance: Normal appearance.   HENT:      Head: Normocephalic and atraumatic.   Eyes:      Conjunctiva/sclera: Conjunctivae normal.   Cardiovascular:      Rate and Rhythm: Normal rate.   Pulmonary:      Effort: Pulmonary effort is normal.   Abdominal:      Comments: Soft, Nontender, nondistended.  Incisions well-healed.   Musculoskeletal:      Cervical back: Neck supple.   Skin:     General: Skin is warm and dry.   Neurological:      General: No focal deficit present.      Mental Status: He is alert and oriented to person, place, and time.   Psychiatric:         Mood and Affect: Mood normal.         Behavior: Behavior normal.              Assessment/Plan   Problem List Items Addressed This Visit        Digestive "    Rectal cancer (CMS/HCC) - Primary         continues to do well from a surgical perspective.  I will see him in follow-up again in October.  I want him to continue with good hydration and fiber supplementation.  We will set him up for a surveillance colonoscopy on his visit then.  In the interim he knows to call with any other questions queries or concerns.       Tiarra Martinez MD

## 2022-06-28 ENCOUNTER — OFFICE VISIT (OUTPATIENT)
Dept: PRIMARY CARE | Facility: CLINIC | Age: 57
End: 2022-06-28
Payer: COMMERCIAL

## 2022-06-28 VITALS
SYSTOLIC BLOOD PRESSURE: 124 MMHG | RESPIRATION RATE: 16 BRPM | HEIGHT: 75 IN | BODY MASS INDEX: 34.57 KG/M2 | TEMPERATURE: 97.8 F | DIASTOLIC BLOOD PRESSURE: 74 MMHG | HEART RATE: 65 BPM | OXYGEN SATURATION: 97 % | WEIGHT: 278 LBS

## 2022-06-28 DIAGNOSIS — C20 RECTAL CANCER (CMS/HCC): ICD-10-CM

## 2022-06-28 DIAGNOSIS — R21 RASH: Primary | ICD-10-CM

## 2022-06-28 PROBLEM — Z48.89 POSTOPERATIVE VISIT: Status: RESOLVED | Noted: 2022-01-14 | Resolved: 2022-06-28

## 2022-06-28 PROCEDURE — 3078F DIAST BP <80 MM HG: CPT | Performed by: FAMILY MEDICINE

## 2022-06-28 PROCEDURE — 99213 OFFICE O/P EST LOW 20 MIN: CPT | Performed by: FAMILY MEDICINE

## 2022-06-28 PROCEDURE — 3008F BODY MASS INDEX DOCD: CPT | Performed by: FAMILY MEDICINE

## 2022-06-28 PROCEDURE — 3074F SYST BP LT 130 MM HG: CPT | Performed by: FAMILY MEDICINE

## 2022-06-28 RX ORDER — DOXYCYCLINE HYCLATE 100 MG
100 TABLET ORAL 2 TIMES DAILY
Qty: 28 TABLET | Refills: 0 | Status: SHIPPED | OUTPATIENT
Start: 2022-06-28 | End: 2022-07-12

## 2022-06-28 ASSESSMENT — ENCOUNTER SYMPTOMS
STRIDOR: 0
NAUSEA: 0
DIAPHORESIS: 0
BLOOD IN STOOL: 0
FEVER: 0
ARTHRALGIAS: 0
POLYPHAGIA: 0
SORE THROAT: 0
DIARRHEA: 1
WHEEZING: 0
CHILLS: 0
JOINT SWELLING: 0
HEMATURIA: 0
ABDOMINAL PAIN: 0
MYALGIAS: 0
POLYDIPSIA: 0
VOMITING: 0
NUMBNESS: 0
SHORTNESS OF BREATH: 0
COUGH: 0
PALPITATIONS: 0
DIZZINESS: 0
CONSTIPATION: 1
HEADACHES: 0
FATIGUE: 1
DYSURIA: 0
WEAKNESS: 0

## 2022-06-28 NOTE — PROGRESS NOTES
"      Subjective      Patient ID: Lake Martin is a 56 y.o. male.  1965      Rash L upper thigh x 3 days  Has enlarged  No insect seen  No itch or pain      The following have been reviewed and updated as appropriate in this visit:   Problems         Review of Systems   Constitutional: Positive for fatigue. Negative for chills, diaphoresis and fever.   HENT: Negative for hearing loss and sore throat.    Eyes: Negative for visual disturbance.   Respiratory: Negative for cough, shortness of breath, wheezing and stridor.    Cardiovascular: Negative for chest pain, palpitations and leg swelling.   Gastrointestinal: Positive for constipation and diarrhea. Negative for abdominal pain, blood in stool, nausea and vomiting.   Endocrine: Negative for cold intolerance, heat intolerance, polydipsia, polyphagia and polyuria.   Genitourinary: Negative for dysuria, hematuria and penile discharge.   Musculoskeletal: Negative for arthralgias, gait problem, joint swelling and myalgias.   Skin: Positive for rash.   Neurological: Negative for dizziness, syncope, weakness, numbness and headaches.   All other systems reviewed and are negative.      Objective     Vitals:    06/28/22 1501   BP: 124/74   BP Location: Left upper arm   Patient Position: Sitting   Pulse: 65   Resp: 16   Temp: 36.6 °C (97.8 °F)   TempSrc: Temporal   SpO2: 97%   Weight: 126 kg (278 lb)   Height: 1.905 m (6' 3\")     Body mass index is 34.75 kg/m².    Physical Exam  Vitals and nursing note reviewed.   Constitutional:       Appearance: Normal appearance.   Eyes:      General: No scleral icterus.  Musculoskeletal:      Right lower leg: No edema.      Left lower leg: No edema.   Skin:     General: Skin is warm and dry.      Findings: Rash present.      Comments: Oval erythem lesion L upper thigh  NT   Neurological:      General: No focal deficit present.      Mental Status: He is alert.   Psychiatric:         Mood and Affect: Mood normal.         " Behavior: Behavior normal.         Assessment/Plan   Diagnoses and all orders for this visit:    Rash (Primary)  Comments:  suspect ECM, Lyme  start doxy    Rectal cancer (CMS/HCC)  Comments:  chemo/xrt    Other orders  -     doxycycline hyclate (VIBRA-TABS) 100 mg tablet; Take 1 tablet (100 mg total) by mouth 2 (two) times a day for 14 days.

## 2022-07-05 RX ORDER — LOSARTAN POTASSIUM AND HYDROCHLOROTHIAZIDE 12.5; 5 MG/1; MG/1
TABLET ORAL
Qty: 180 TABLET | Refills: 3 | Status: SHIPPED | OUTPATIENT
Start: 2022-07-05 | End: 2023-08-03 | Stop reason: SDUPTHER

## 2022-07-13 RX ORDER — METFORMIN HYDROCHLORIDE 1000 MG/1
1000 TABLET ORAL 2 TIMES DAILY WITH MEALS
Qty: 180 TABLET | Refills: 0 | Status: SHIPPED | OUTPATIENT
Start: 2022-07-13 | End: 2022-10-10

## 2022-07-13 NOTE — TELEPHONE ENCOUNTER
Medicine Refill Request    Last Office: 6/28/2022   Last Consult Visit: 12/17/2021  Last Telemedicine Visit: Visit date not found    Next Appointment: Visit date not found      Current Outpatient Medications:   •  amLODIPine (NORVASC) 5 mg tablet, TAKE 1 TABLET (5 MG TOTAL) BY MOUTH DAILY., Disp: 90 tablet, Rfl: 1  •  capecitabine (XELODA) 500 mg chemo tablet, Take   As directed, Disp: , Rfl:   •  losartan-hydrochlorothiazide (HYZAAR) 50-12.5 mg per tablet, TAKE 2 TABLETS BY MOUTH EVERY DAY, Disp: 180 tablet, Rfl: 3  •  metFORMIN 1,000 mg tablet, TAKE 1 TABLET BY MOUTH TWICE A DAY WITH MEALS, Disp: 180 tablet, Rfl: 0      BP Readings from Last 3 Encounters:   06/28/22 124/74   02/02/22 (!) 162/90   01/26/22 (!) 194/104       Recent Lab results:  Lab Results   Component Value Date    CHOL 185 03/05/2021   ,   Lab Results   Component Value Date    HDL 48 03/05/2021   ,   Lab Results   Component Value Date    LDLCALC 109 (H) 03/05/2021   ,   Lab Results   Component Value Date    TRIG 161 (H) 03/05/2021        Lab Results   Component Value Date    GLUCOSE 169 (H) 01/05/2022   ,   Lab Results   Component Value Date    HGBA1C 6.7 (H) 03/05/2021         Lab Results   Component Value Date    CREATININE 0.7 (L) 01/05/2022       Lab Results   Component Value Date    TSH 3.52 12/15/2015          drew

## 2022-10-14 ENCOUNTER — OFFICE VISIT (OUTPATIENT)
Dept: SURGERY | Facility: CLINIC | Age: 57
End: 2022-10-14
Payer: COMMERCIAL

## 2022-10-14 VITALS — BODY MASS INDEX: 36.53 KG/M2 | HEIGHT: 76 IN | WEIGHT: 300 LBS

## 2022-10-14 DIAGNOSIS — C20 RECTAL CANCER (CMS/HCC): Primary | ICD-10-CM

## 2022-10-14 PROCEDURE — 3008F BODY MASS INDEX DOCD: CPT | Performed by: SURGERY

## 2022-10-14 PROCEDURE — 99213 OFFICE O/P EST LOW 20 MIN: CPT | Performed by: SURGERY

## 2022-10-14 NOTE — PROGRESS NOTES
Patient ID: Lake Martin                              : 1965  MRN: [id number]                                            Visit Date: 10/14/2022  Encounter Provider: Tiarra Martinez  Referring Provider: Leland Fishman MD    Subjective   Chief Complaint: Follow-up      Lake Martin is a 56 y.o. old male presenting today for a surveillance follow-up visit.  He underwent a robotic low anterior resection for mid to low rectal cancer in 2021.  Final pathologic stage being PT2N1A with 1 out of 78 lymph nodes involved.  Postoperatively he did undergo chemoradiation and finished treatments in 2022.  He reports feeling well.  Denies any abdominal pain.  Has good control over his bowel movements.  No fevers or chills.  Tolerating a regular diet without nausea vomiting.  Review of systems otherwise negative..      Medications:     Current Outpatient Medications:     amLODIPine (NORVASC) 5 mg tablet, TAKE 1 TABLET (5 MG TOTAL) BY MOUTH DAILY., Disp: 90 tablet, Rfl: 1    capecitabine (XELODA) 500 mg chemo tablet, Take   As directed, Disp: , Rfl:     losartan-hydrochlorothiazide (HYZAAR) 50-12.5 mg per tablet, TAKE 2 TABLETS BY MOUTH EVERY DAY, Disp: 180 tablet, Rfl: 3    metFORMIN (GLUCOPHAGE) 1,000 mg tablet, TAKE 1 TABLET BY MOUTH TWICE A DAY WITH MEALS, Disp: 180 tablet, Rfl: 0    Past Medical History:  has a past medical history of Arthritis, COVID-19 vaccine series completed, Diabetes (CMS/HCC), Eye injury, Hypertension, Migraine, and Type 2 diabetes mellitus (CMS/HCC).    He has no past medical history of History of transfusion.  Past Surgical History:  has a past surgical history that includes Colonoscopy () and Colectomy.  Social History:   Social History     Tobacco Use    Smoking status: Never    Smokeless tobacco: Never   Vaping Use    Vaping Use: Never used   Substance Use Topics    Alcohol use: Not Currently    Drug use: Never     Family History: family  "history includes Diabetes in his biological father; Heart failure in his biological father; Lung cancer in his maternal grandfather; No Known Problems in his biological sister; Other in his biological mother; Stroke in his biological father.  Allergies: has No Known Allergies.     Review of Systems  The following have been reviewed and updated as appropriate in this visit:          Objective   Vitals:   Visit Vitals  Ht 1.93 m (6' 4\")   Wt 136 kg (300 lb)   BMI 36.52 kg/m²     Physical Exam  Vitals reviewed.   Constitutional:       Appearance: Normal appearance.   HENT:      Head: Normocephalic and atraumatic.   Eyes:      Conjunctiva/sclera: Conjunctivae normal.   Cardiovascular:      Rate and Rhythm: Normal rate.   Pulmonary:      Effort: Pulmonary effort is normal.   Abdominal:      Comments: Soft, Nontender, nondistended.  Incisions well-healed.   Musculoskeletal:      Cervical back: Neck supple.   Skin:     General: Skin is warm and dry.   Neurological:      General: No focal deficit present.      Mental Status: He is alert and oriented to person, place, and time.   Psychiatric:         Mood and Affect: Mood normal.         Behavior: Behavior normal.        Assessment/Plan   Problem List Items Addressed This Visit        Digestive    Rectal cancer (CMS/HCC) - Primary      continues to do well from a colorectal perspective.  He continues to maintain surveillance with oncology.  I would like to set him up for a surveillance colonoscopy in December 2022 a year out from surgery.  My office will help him schedule this.  He should good understanding of our plan and I answered his questions to his satisfaction.  He knows to call with any other questions worries or concerns.       Tiarra Martinez MD  "

## 2022-10-14 NOTE — LETTER
2022     Leland Fishman MD  [address] [facility] [address]  [place] [address]    Patient: Lake Martin  YOB: 1965  Date of Visit: 10/14/2022      Dear Dr. Fishman:    Thank you for referring Lake Martin to me for evaluation. Below are my notes for this consultation.    If you have questions, please do not hesitate to call me. I look forward to following your patient along with you.         Sincerely,        Tiarra Martinez MD        CC: Tiarra Porras MD  10/25/2022 11:16 AM  Sign when Signing Visit  Patient ID: Lake Martin                              : 1965  MRN: [id number]                                            Visit Date: 10/14/2022  Encounter Provider: Tiarra Martinez  Referring Provider: Leland Fishman MD    Subjective    Chief Complaint: Follow-up      Lake Martin is a 56 y.o. old male presenting today for a surveillance follow-up visit.  He underwent a robotic low anterior resection for mid to low rectal cancer in 2021.  Final pathologic stage being PT2N1A with 1 out of 78 lymph nodes involved.  Postoperatively he did undergo chemoradiation and finished treatments in 2022.  He reports feeling well.  Denies any abdominal pain.  Has good control over his bowel movements.  No fevers or chills.  Tolerating a regular diet without nausea vomiting.  Review of systems otherwise negative..     Medications:     Current Outpatient Medications:     amLODIPine (NORVASC) 5 mg tablet, TAKE 1 TABLET (5 MG TOTAL) BY MOUTH DAILY., Disp: 90 tablet, Rfl: 1    capecitabine (XELODA) 500 mg chemo tablet, Take   As directed, Disp: , Rfl:     losartan-hydrochlorothiazide (HYZAAR) 50-12.5 mg per tablet, TAKE 2 TABLETS BY MOUTH EVERY DAY, Disp: 180 tablet, Rfl: 3    metFORMIN (GLUCOPHAGE) 1,000 mg tablet, TAKE 1 TABLET BY MOUTH TWICE A DAY WITH MEALS, Disp: 180 tablet, Rfl: 0    Past Medical History:  has a past  "medical history of Arthritis, COVID-19 vaccine series completed, Diabetes (CMS/HCC), Eye injury, Hypertension, Migraine, and Type 2 diabetes mellitus (CMS/[facility]).    He has no past medical history of History of transfusion.  Past Surgical History:  has a past surgical history that includes Colonoscopy (2021) and Colectomy.  Social History:   Social History     Tobacco Use    Smoking status: Never    Smokeless tobacco: Never   Vaping Use    Vaping Use: Never used   Substance Use Topics    Alcohol use: Not Currently    Drug use: Never     Family History: family history includes Diabetes in his biological father; Heart failure in his biological father; Lung cancer in his maternal grandfather; No Known Problems in his biological sister; Other in his biological mother; Stroke in his biological father.  Allergies: has No Known Allergies.     Review of Systems  The following have been reviewed and updated as appropriate in this visit:          Objective    Vitals:   Visit Vitals  Ht 1.93 m (6' 4\")   Wt 136 kg (300 lb)   BMI 36.52 kg/m²     Physical Exam  Vitals reviewed.   Constitutional:       Appearance: Normal appearance.   HENT:      Head: Normocephalic and atraumatic.   Eyes:      Conjunctiva/sclera: Conjunctivae normal.   Cardiovascular:      Rate and Rhythm: Normal rate.   Pulmonary:      Effort: Pulmonary effort is normal.   Abdominal:      Comments: Soft, Nontender, nondistended.  Incisions well-healed.   Musculoskeletal:      Cervical back: Neck supple.   Skin:     General: Skin is warm and dry.   Neurological:      General: No focal deficit present.      Mental Status: He is alert and oriented to person, place, and time.   Psychiatric:         Mood and Affect: Mood normal.         Behavior: Behavior normal.       Assessment/Plan    Problem List Items Addressed This Visit        Digestive    Rectal cancer (CMS/HCC) - Primary      continues to do well from a colorectal perspective.  He continues to " maintain surveillance with oncology.  I would like to set him up for a surveillance colonoscopy in December 2022 a year out from surgery.  My office will help him schedule this.  He should good understanding of our plan and I answered his questions to his satisfaction.  He knows to call with any other questions worries or concerns.      Tiarra Martinez MD

## 2022-12-02 RX ORDER — AMLODIPINE BESYLATE 5 MG/1
5 TABLET ORAL DAILY
Qty: 90 TABLET | Refills: 1 | Status: SHIPPED | OUTPATIENT
Start: 2022-12-02 | End: 2023-06-07

## 2022-12-02 NOTE — TELEPHONE ENCOUNTER
Medicine Refill Request    Last Office: 6/28/2022   Last Consult Visit: 12/17/2021  Last Telemedicine Visit: Visit date not found    Next Appointment: Visit date not found      Current Outpatient Medications:   •  amLODIPine (NORVASC) 5 mg tablet, TAKE 1 TABLET (5 MG TOTAL) BY MOUTH DAILY., Disp: 90 tablet, Rfl: 1  •  capecitabine (XELODA) 500 mg chemo tablet, Take   As directed, Disp: , Rfl:   •  losartan-hydrochlorothiazide (HYZAAR) 50-12.5 mg per tablet, TAKE 2 TABLETS BY MOUTH EVERY DAY, Disp: 180 tablet, Rfl: 3  •  metFORMIN (GLUCOPHAGE) 1,000 mg tablet, TAKE 1 TABLET BY MOUTH TWICE A DAY WITH MEALS, Disp: 180 tablet, Rfl: 0      BP Readings from Last 3 Encounters:   06/28/22 124/74   02/02/22 (!) 162/90   01/26/22 (!) 194/104       Recent Lab results:  Lab Results   Component Value Date    CHOL 185 03/05/2021   ,   Lab Results   Component Value Date    HDL 48 03/05/2021   ,   Lab Results   Component Value Date    LDLCALC 109 (H) 03/05/2021   ,   Lab Results   Component Value Date    TRIG 161 (H) 03/05/2021        Lab Results   Component Value Date    GLUCOSE 169 (H) 01/05/2022   ,   Lab Results   Component Value Date    HGBA1C 6.7 (H) 03/05/2021         Lab Results   Component Value Date    CREATININE 0.7 (L) 01/05/2022       Lab Results   Component Value Date    TSH 3.52 12/15/2015              Detail Level: Generalized General Sunscreen Counseling: I recommended a broad spectrum sunscreen with a SPF of 30 or higher.  I explained that SPF 30 sunscreens block approximately 97 percent of the sun's harmful rays.  Sunscreens should be applied at least 15 minutes prior to expected sun exposure and then every 2 hours after that as long as sun exposure continues. If swimming or exercising sunscreen should be reapplied every 45 minutes to an hour after getting wet or sweating.  One ounce, or the equivalent of a shot glass full of sunscreen, is adequate to protect the skin not covered by a bathing suit. I also recommended a lip balm with a sunscreen as well. Sun protective clothing can be used in lieu of sunscreen but must be worn the entire time you are exposed to the sun's rays.

## 2022-12-06 ENCOUNTER — TELEPHONE (OUTPATIENT)
Dept: SURGERY | Facility: CLINIC | Age: 57
End: 2022-12-06
Payer: COMMERCIAL

## 2022-12-06 NOTE — TELEPHONE ENCOUNTER
Left message with Lake to confirm his upcoming colonoscopy that is set for Monday 12/12   / check to see if he has any questions      Lake did return my call.He confirmed his colonoscopy- I let him know the surgical center will call him Friday to give him a time for Monday.

## 2022-12-12 PROCEDURE — 88305 TISSUE EXAM BY PATHOLOGIST: CPT | Performed by: SURGERY

## 2022-12-12 PROCEDURE — 45385 COLONOSCOPY W/LESION REMOVAL: CPT | Performed by: SURGERY

## 2022-12-13 ENCOUNTER — LAB REQUISITION (OUTPATIENT)
Dept: LAB | Facility: HOSPITAL | Age: 57
End: 2022-12-13
Attending: SURGERY
Payer: COMMERCIAL

## 2022-12-13 DIAGNOSIS — C20 MALIGNANT NEOPLASM OF RECTUM (CMS/HCC): ICD-10-CM

## 2022-12-23 LAB
CASE RPRT: NORMAL
CLINICAL INFO: NORMAL
PATH REPORT.FINAL DX SPEC: NORMAL
PATH REPORT.GROSS SPEC: NORMAL

## 2022-12-30 ENCOUNTER — HOSPITAL ENCOUNTER (OUTPATIENT)
Dept: CARDIOLOGY | Facility: HOSPITAL | Age: 57
Discharge: HOME | End: 2022-12-30
Attending: INTERNAL MEDICINE | Admitting: RADIOLOGY
Payer: COMMERCIAL

## 2022-12-30 VITALS — DIASTOLIC BLOOD PRESSURE: 93 MMHG | SYSTOLIC BLOOD PRESSURE: 157 MMHG | OXYGEN SATURATION: 97 % | TEMPERATURE: 98.6 F

## 2022-12-30 DIAGNOSIS — C20 MALIGNANT NEOPLASM OF RECTUM (CMS/HCC): ICD-10-CM

## 2022-12-30 PROCEDURE — 02PY33Z REMOVAL OF INFUSION DEVICE FROM GREAT VESSEL, PERCUTANEOUS APPROACH: ICD-10-PCS | Performed by: RADIOLOGY

## 2022-12-30 PROCEDURE — 27200000 HC STERILE SUPPLY

## 2022-12-30 PROCEDURE — 77001 FLUOROGUIDE FOR VEIN DEVICE: CPT

## 2022-12-30 PROCEDURE — 36100345 IR PORT REMOVAL

## 2022-12-30 PROCEDURE — 0JPT0WZ REMOVAL OF TOTALLY IMPLANTABLE VASCULAR ACCESS DEVICE FROM TRUNK SUBCUTANEOUS TISSUE AND FASCIA, OPEN APPROACH: ICD-10-PCS | Performed by: RADIOLOGY

## 2022-12-30 PROCEDURE — 25000000 HC PHARMACY GENERAL: Performed by: RADIOLOGY

## 2022-12-30 RX ORDER — LIDOCAINE HYDROCHLORIDE AND EPINEPHRINE 10; 10 UG/ML; MG/ML
INJECTION, SOLUTION INFILTRATION; PERINEURAL
Status: COMPLETED | OUTPATIENT
Start: 2022-12-30 | End: 2022-12-30

## 2022-12-30 RX ORDER — LIDOCAINE HYDROCHLORIDE 10 MG/ML
INJECTION, SOLUTION INFILTRATION; PERINEURAL
Status: COMPLETED | OUTPATIENT
Start: 2022-12-30 | End: 2022-12-30

## 2022-12-30 RX ADMIN — LIDOCAINE HYDROCHLORIDE AND EPINEPHRINE 4 ML: 10; 10 INJECTION, SOLUTION INFILTRATION; PERINEURAL at 14:05

## 2022-12-30 RX ADMIN — LIDOCAINE HYDROCHLORIDE 9 ML: 10 INJECTION, SOLUTION INFILTRATION; PERINEURAL at 14:05

## 2022-12-30 NOTE — POST-PROCEDURE NOTE
Interventional Radiology Brief Postprocedure Note    Lakepratibha Martin     Attending: Joesph Gilmore DO     Assistant: John    Diagnosis: rectal CA    Description of procedure: R chest port removal    Contrast: none     Anesthesia:  Local (Lidocaine)    Volume of Lidocaine Utilized (ml): 10 w/o epi and 6 w/ epi     Medications: none     Complications: None      Estimated Blood Loss: Estimated Blood Loss: 0-10 ml    Anticoagulation: Can resume as needed.    Specimens: R chest port and catheter- discarded    Findings: R chest port and catheter removed.    12/30/2022 2:23 PM

## 2022-12-30 NOTE — DISCHARGE INSTRUCTIONS
Implanted Port Removal    DISCHARGE INSTRUCTIONS  You have had an implanted port removed today from your chest.  Keep the dressing dry and in place for 3 days  If the area is sore, you may apply ice to the area in 20 minute intervals; alternating 20 minutes ice with a 20 minute break.  If you have pain, you may take Tylenol 650mg by mouth every 6 hours. DO NOT exceed 2000mg in a 24 hour period.  No strenuous activity or heavy lifting for 7 days. Do not submerge the area in water for 7 days (i.e. bath, swimming, hot tubs). You may shower.  You may resume your normal diet.  You may resume your normal medications.   Notify your oncology physician and/or Interventional Radiologist IMMEDIATELY if any of the following occur:  · Fever of 101° F (38 ° C) or chills  · Pain, redness or swelling around the port.  Physician Contact Information  If you have a problem that you believe requires immediate attention, you should go to the Emergency Room, either at  [facility] or the closest hospital. If you believe that your problem can safely wait until you speak to a physician, you should contact your doctor or Interventional Radiologist.   It is usually easier to contact your own physician by calling the office, or after hours through the answering service. If you do not know the number, the hospital  can connect you by calling [phone].   If you have concerns that need to be answered by the Interventional Radiologist, you can reach him/ her as follows:   During regular weekday hours (8AM to 5PM), call [phone] and ask the coordinator to connect you with the Interventional Radiologist.   During off hours for emergencies call [phone] and ask the hospital  to contact the Interventional Radiologist on-call.    [facility] strongly recommends that you visit a Primary Care Provider (PCP) regularly. Your PCP can help you implement the recommendations we gave you today, coordinate care among your  specialists, as well as make sure you are up to date with wellness exams, immunizations and preventive screenings. Your PCP can also help when you are feeling sick, potentially avoiding the need for urgent care or emergency department visits. For these reasons, it is important that you follow up with your PCP at least annually or more often based upon your medical conditions. If you do not have a PCP, please call [phone] ([phone]) or go to Find a Doctor for help with finding one.

## 2022-12-30 NOTE — Clinical Note
The right chest was and prepped with ChloraPrep. The patient was draped in a sterile fashion after allowing for the recommended dry time.

## 2022-12-30 NOTE — NURSING NOTE
Incision made and port removed without difficulty. Pt jared proc well. See physician note for full procedural details.

## 2023-03-28 RX ORDER — METFORMIN HYDROCHLORIDE 1000 MG/1
1000 TABLET ORAL 2 TIMES DAILY WITH MEALS
Qty: 60 TABLET | Refills: 0 | Status: CANCELLED | OUTPATIENT
Start: 2023-03-28

## 2023-03-28 NOTE — TELEPHONE ENCOUNTER
Medicine Refill Request    Last Office: 6/28/2022   Last Consult Visit: 12/17/2021  Last Telemedicine Visit: Visit date not found    Next Appointment: Visit date not found      Current Outpatient Medications:   •  amLODIPine (NORVASC) 5 mg tablet, TAKE 1 TABLET (5 MG TOTAL) BY MOUTH DAILY., Disp: 90 tablet, Rfl: 1  •  capecitabine (XELODA) 500 mg chemo tablet, Take   As directed, Disp: , Rfl:   •  losartan-hydrochlorothiazide (HYZAAR) 50-12.5 mg per tablet, TAKE 2 TABLETS BY MOUTH EVERY DAY, Disp: 180 tablet, Rfl: 3  •  metFORMIN (GLUCOPHAGE) 1,000 mg tablet, TAKE 1 TABLET BY MOUTH TWICE A DAY WITH MEALS, Disp: 60 tablet, Rfl: 0      BP Readings from Last 3 Encounters:   12/30/22 (!) 157/93   06/28/22 124/74   02/02/22 (!) 162/90       Recent Lab results:  Lab Results   Component Value Date    CHOL 185 03/05/2021   ,   Lab Results   Component Value Date    HDL 48 03/05/2021   ,   Lab Results   Component Value Date    LDLCALC 109 (H) 03/05/2021   ,   Lab Results   Component Value Date    TRIG 161 (H) 03/05/2021        Lab Results   Component Value Date    GLUCOSE 169 (H) 01/05/2022   ,   Lab Results   Component Value Date    HGBA1C 6.7 (H) 03/05/2021         Lab Results   Component Value Date    CREATININE 0.7 (L) 01/05/2022       Lab Results   Component Value Date    TSH 3.52 12/15/2015

## 2023-04-05 RX ORDER — METFORMIN HYDROCHLORIDE 1000 MG/1
1000 TABLET ORAL 2 TIMES DAILY WITH MEALS
Qty: 60 TABLET | Refills: 0 | Status: SHIPPED | OUTPATIENT
Start: 2023-04-05 | End: 2023-06-07 | Stop reason: SDUPTHER

## 2023-05-02 ENCOUNTER — OFFICE VISIT (OUTPATIENT)
Dept: PRIMARY CARE | Facility: CLINIC | Age: 58
End: 2023-05-02
Payer: COMMERCIAL

## 2023-05-02 VITALS
BODY MASS INDEX: 39.17 KG/M2 | HEIGHT: 75 IN | SYSTOLIC BLOOD PRESSURE: 135 MMHG | TEMPERATURE: 97.4 F | HEART RATE: 72 BPM | OXYGEN SATURATION: 98 % | DIASTOLIC BLOOD PRESSURE: 82 MMHG | WEIGHT: 315 LBS

## 2023-05-02 DIAGNOSIS — E11.65 TYPE 2 DIABETES MELLITUS WITH HYPERGLYCEMIA, WITHOUT LONG-TERM CURRENT USE OF INSULIN (CMS/HCC): ICD-10-CM

## 2023-05-02 DIAGNOSIS — C20 RECTAL CANCER (CMS/HCC): ICD-10-CM

## 2023-05-02 DIAGNOSIS — Z12.5 SCREENING PSA (PROSTATE SPECIFIC ANTIGEN): ICD-10-CM

## 2023-05-02 DIAGNOSIS — E78.5 ELEVATED LIPIDS: ICD-10-CM

## 2023-05-02 DIAGNOSIS — Z00.00 ENCOUNTER FOR GENERAL ADULT MEDICAL EXAMINATION WITHOUT ABNORMAL FINDINGS: Primary | ICD-10-CM

## 2023-05-02 PROBLEM — R91.8 PULMONARY NODULES: Status: RESOLVED | Noted: 2021-12-16 | Resolved: 2023-05-02

## 2023-05-02 PROCEDURE — 3079F DIAST BP 80-89 MM HG: CPT | Performed by: FAMILY MEDICINE

## 2023-05-02 PROCEDURE — 99396 PREV VISIT EST AGE 40-64: CPT | Performed by: FAMILY MEDICINE

## 2023-05-02 PROCEDURE — 3075F SYST BP GE 130 - 139MM HG: CPT | Performed by: FAMILY MEDICINE

## 2023-05-02 PROCEDURE — 3008F BODY MASS INDEX DOCD: CPT | Performed by: FAMILY MEDICINE

## 2023-05-02 ASSESSMENT — ENCOUNTER SYMPTOMS
NUMBNESS: 0
DYSPHORIC MOOD: 0
HEMATURIA: 0
DYSURIA: 0
WEAKNESS: 0
BLOOD IN STOOL: 0
NAUSEA: 0
STRIDOR: 0
PALPITATIONS: 0
DIZZINESS: 0
VOMITING: 0
HEADACHES: 0
WHEEZING: 0
CHILLS: 0
SHORTNESS OF BREATH: 0
POLYDIPSIA: 0
ABDOMINAL PAIN: 0
CONSTIPATION: 0
FEVER: 0
CONFUSION: 0
SORE THROAT: 0
DIARRHEA: 0
POLYPHAGIA: 0
COUGH: 0

## 2023-05-02 NOTE — PROGRESS NOTES
"      Subjective      Patient ID: Lake Martin is a 57 y.o. male.  1965      epp      The following have been reviewed and updated as appropriate in this visit:   Problems       Review of Systems   Constitutional: Negative for chills and fever.   HENT: Negative for dental problem, hearing loss and sore throat.    Eyes: Negative for visual disturbance.   Respiratory: Negative for cough, shortness of breath, wheezing and stridor.    Cardiovascular: Negative for chest pain, palpitations and leg swelling.   Gastrointestinal: Negative for abdominal pain, blood in stool, constipation, diarrhea, nausea and vomiting.   Endocrine: Negative for cold intolerance, heat intolerance, polydipsia, polyphagia and polyuria.   Genitourinary: Negative for dysuria, hematuria, penile discharge, penile pain, penile swelling, scrotal swelling and testicular pain.   Musculoskeletal: Negative for gait problem.   Skin: Negative for rash.   Neurological: Negative for dizziness, syncope, weakness, numbness and headaches.   Psychiatric/Behavioral: Negative for confusion and dysphoric mood.   All other systems reviewed and are negative.      Objective     Vitals:    05/02/23 1556 05/02/23 1629   BP: (!) 154/96 135/82   Pulse: 72    Temp: 36.3 °C (97.4 °F)    SpO2: 98%    Weight: (!) 149 kg (328 lb)    Height: 1.905 m (6' 3\")      Body mass index is 41 kg/m².    Physical Exam  Vitals and nursing note reviewed.   Constitutional:       Appearance: Normal appearance. He is well-developed.   HENT:      Head: Normocephalic and atraumatic.      Right Ear: Tympanic membrane and external ear normal. There is impacted cerumen.      Left Ear: Tympanic membrane, ear canal and external ear normal.      Nose: Nose normal.      Mouth/Throat:      Mouth: Mucous membranes are moist.      Pharynx: Oropharynx is clear.   Eyes:      General: No scleral icterus.     Extraocular Movements: Extraocular movements intact.      Conjunctiva/sclera: " Conjunctivae normal.      Pupils: Pupils are equal, round, and reactive to light.   Neck:      Thyroid: No thyromegaly.      Vascular: No JVD.      Trachea: No tracheal deviation.   Cardiovascular:      Rate and Rhythm: Normal rate and regular rhythm.      Heart sounds: Normal heart sounds.   Pulmonary:      Effort: Pulmonary effort is normal. No respiratory distress.      Breath sounds: Normal breath sounds. No stridor.   Abdominal:      General: Bowel sounds are normal. There is no distension.      Palpations: Abdomen is soft. There is no mass.      Tenderness: There is no abdominal tenderness. There is no guarding or rebound.      Hernia: No hernia is present.   Musculoskeletal:         General: Normal range of motion.      Cervical back: Normal range of motion and neck supple.      Right lower leg: No edema.      Left lower leg: No edema.   Lymphadenopathy:      Cervical: No cervical adenopathy.   Skin:     General: Skin is warm and dry.      Findings: No rash.   Neurological:      General: No focal deficit present.      Mental Status: He is alert and oriented to person, place, and time.   Psychiatric:         Mood and Affect: Mood normal.         Behavior: Behavior normal.         Assessment/Plan   Diagnoses and all orders for this visit:    Encounter for general adult medical examination without abnormal findings (Primary)  Comments:  lab    Type 2 diabetes mellitus with hyperglycemia, without long-term current use of insulin (CMS/[facility])  -     CBC and differential; Future  -     Comprehensive metabolic panel; Future  -     Hemoglobin A1c; Future  -     Lipid panel; Future  -     Microalbumin / creatinine urine ratio; Future    Elevated lipids  -     CBC and differential; Future  -     Comprehensive metabolic panel; Future  -     Lipid panel; Future    Screening PSA (prostate specific antigen)  -     PSA; Future    Rectal cancer (CMS/[facility])

## 2023-06-07 RX ORDER — AMLODIPINE BESYLATE 5 MG/1
5 TABLET ORAL DAILY
Qty: 90 TABLET | Refills: 1 | Status: SHIPPED | OUTPATIENT
Start: 2023-06-07 | End: 2023-12-04

## 2023-06-07 NOTE — TELEPHONE ENCOUNTER
Medicine Refill Request    Last Office: 5/2/2023   Last Consult Visit: 12/17/2021  Last Telemedicine Visit: Visit date not found    Next Appointment: Visit date not found      Current Outpatient Medications:   •  amLODIPine (NORVASC) 5 mg tablet, TAKE 1 TABLET (5 MG TOTAL) BY MOUTH DAILY., Disp: 90 tablet, Rfl: 1  •  losartan-hydrochlorothiazide (HYZAAR) 50-12.5 mg per tablet, TAKE 2 TABLETS BY MOUTH EVERY DAY, Disp: 180 tablet, Rfl: 3  •  metFORMIN (GLUCOPHAGE) 1,000 mg tablet, Take 1 tablet (1,000 mg total) by mouth 2 (two) times a day with meals., Disp: 60 tablet, Rfl: 0      BP Readings from Last 3 Encounters:   05/02/23 135/82   12/30/22 (!) 157/93   06/28/22 124/74       Recent Lab results:  Lab Results   Component Value Date    CHOL 185 03/05/2021   ,   Lab Results   Component Value Date    HDL 48 03/05/2021   ,   Lab Results   Component Value Date    LDLCALC 109 (H) 03/05/2021   ,   Lab Results   Component Value Date    TRIG 161 (H) 03/05/2021        Lab Results   Component Value Date    GLUCOSE 169 (H) 01/05/2022   ,   Lab Results   Component Value Date    HGBA1C 6.7 (H) 03/05/2021         Lab Results   Component Value Date    CREATININE 0.7 (L) 01/05/2022       Lab Results   Component Value Date    TSH 3.52 12/15/2015

## 2023-06-08 RX ORDER — METFORMIN HYDROCHLORIDE 1000 MG/1
1000 TABLET ORAL 2 TIMES DAILY WITH MEALS
Qty: 180 TABLET | Refills: 0 | Status: SHIPPED | OUTPATIENT
Start: 2023-06-08 | End: 2023-09-07

## 2023-07-12 ENCOUNTER — OFFICE VISIT (OUTPATIENT)
Dept: PRIMARY CARE | Facility: CLINIC | Age: 58
End: 2023-07-12
Payer: COMMERCIAL

## 2023-07-12 VITALS
OXYGEN SATURATION: 96 % | TEMPERATURE: 96.4 F | DIASTOLIC BLOOD PRESSURE: 80 MMHG | WEIGHT: 315 LBS | BODY MASS INDEX: 38.36 KG/M2 | HEIGHT: 76 IN | SYSTOLIC BLOOD PRESSURE: 130 MMHG | HEART RATE: 73 BPM | RESPIRATION RATE: 16 BRPM

## 2023-07-12 DIAGNOSIS — A69.20 LYME DISEASE: Primary | ICD-10-CM

## 2023-07-12 PROCEDURE — 99213 OFFICE O/P EST LOW 20 MIN: CPT | Performed by: FAMILY MEDICINE

## 2023-07-12 PROCEDURE — 3075F SYST BP GE 130 - 139MM HG: CPT | Performed by: FAMILY MEDICINE

## 2023-07-12 PROCEDURE — 3079F DIAST BP 80-89 MM HG: CPT | Performed by: FAMILY MEDICINE

## 2023-07-12 PROCEDURE — 3008F BODY MASS INDEX DOCD: CPT | Performed by: FAMILY MEDICINE

## 2023-07-12 RX ORDER — DOXYCYCLINE HYCLATE 100 MG
100 TABLET ORAL 2 TIMES DAILY
Qty: 56 TABLET | Refills: 0 | Status: SHIPPED | OUTPATIENT
Start: 2023-07-12 | End: 2023-12-17

## 2023-07-12 ASSESSMENT — PATIENT HEALTH QUESTIONNAIRE - PHQ9: SUM OF ALL RESPONSES TO PHQ9 QUESTIONS 1 & 2: 0

## 2023-07-12 NOTE — PROGRESS NOTES
Patient ID: Lake Martin is a 57 y.o. male.    Subjective     HPI no  Fever  Or chills or HA     Chief Complaint   Patient presents with   • Tick Removal     Tick bit and rash on upper right inner arm noticed on Saturday has gotten worse       Patient Active Problem List   Diagnosis   • Diabetes mellitus (CMS/HCC)   • Elevated lipids   • Primary hypertension   • Rectal cancer (CMS/HCC)   • Kidney stone       Past Medical History:   Diagnosis Date   • Arthritis     knees   • COVID-19 vaccine series completed     pfizer plus booster   • Diabetes (CMS/HCC)    • Eye injury     Right eye pressure injury   • Hypertension    • Migraine     hasnt had one in approx 2 yrs -2019   • Pulmonary nodules 12/16/2021   • Type 2 diabetes mellitus (CMS/HCC)        Past Surgical History:   Procedure Laterality Date   • COLECTOMY     • COLONOSCOPY  2021       Family History   Problem Relation Age of Onset   • Other Biological Mother         pacemaker   • Stroke Biological Father    • Diabetes Biological Father    • Heart failure Biological Father    • No Known Problems Biological Sister    • Lung cancer Maternal Grandfather         Smoker 50 years    • Colon cancer Neg Hx    • Rectal cancer Neg Hx        Social History     Socioeconomic History   • Marital status:      Spouse name: Christi    • Number of children: 1   • Years of education: None   • Highest education level: None   Occupational History   • Occupation: Electorical consultant    Tobacco Use   • Smoking status: Never   • Smokeless tobacco: Never   Vaping Use   • Vaping Use: Never used   Substance and Sexual Activity   • Alcohol use: Not Currently   • Drug use: Never   • Sexual activity: Defer       Current Outpatient Medications   Medication Sig Dispense Refill   • doxycycline hyclate (VIBRA-TABS) 100 mg tablet Take 1 tablet (100 mg total) by mouth 2 (two) times a day. 56 tablet 0   • amLODIPine (NORVASC) 5 mg tablet TAKE 1 TABLET (5 MG TOTAL) BY MOUTH DAILY.  "90 tablet 1   • losartan-hydrochlorothiazide (HYZAAR) 50-12.5 mg per tablet TAKE 2 TABLETS BY MOUTH EVERY  tablet 3   • metFORMIN (GLUCOPHAGE) 1,000 mg tablet Take 1 tablet (1,000 mg total) by mouth 2 (two) times a day with meals. 180 tablet 0     No current facility-administered medications for this visit.       Alllergies   Patient has no known allergies.      The following have been reviewed and updated as appropriate in this visit:        Review of Systems raised  Bite  w  large  Area of surrounding  Erythema    no f c  Ha   Objective     Vitals:    07/12/23 0812 07/12/23 0834   BP: (!) 144/96 130/80   BP Location: Left upper arm Left upper arm   Patient Position: Sitting Sitting   Pulse: 73    Resp: 16    Temp: (!) 35.8 °C (96.4 °F)    TempSrc: Temporal    SpO2: 96%    Weight: (!) 146 kg (322 lb 12.8 oz)    Height: 1.93 m (6' 4\")      Body mass index is 39.29 kg/m².    Physical Exam  3 wks doxy  raised  Bite  w  large  Area of surrounding  Erythema     Assessment/Plan   Problem List Items Addressed This Visit    None  Visit Diagnoses     Lyme disease    -  Primary    Relevant Medications    doxycycline hyclate (VIBRA-TABS) 100 mg tablet        3 wks doxy left over med  For   Prophylaxis  2 at onset of bite   "

## 2023-08-02 LAB
BASOPHILS # BLD AUTO: 0 X10E3/UL (ref 0–0.2)
BASOPHILS NFR BLD AUTO: 1 %
EOSINOPHIL # BLD AUTO: 0.2 X10E3/UL (ref 0–0.4)
EOSINOPHIL NFR BLD AUTO: 2 %
ERYTHROCYTE [DISTWIDTH] IN BLOOD BY AUTOMATED COUNT: 12.9 % (ref 11.6–15.4)
HBA1C MFR BLD: 10 % (ref 4.8–5.6)
HCT VFR BLD AUTO: 45.1 % (ref 37.5–51)
HGB BLD-MCNC: 15.3 G/DL (ref 13–17.7)
IMM GRANULOCYTES # BLD AUTO: 0 X10E3/UL (ref 0–0.1)
IMM GRANULOCYTES NFR BLD AUTO: 1 %
LYMPHOCYTES # BLD AUTO: 1.3 X10E3/UL (ref 0.7–3.1)
LYMPHOCYTES NFR BLD AUTO: 16 %
MCH RBC QN AUTO: 31.6 PG (ref 26.6–33)
MCHC RBC AUTO-ENTMCNC: 33.9 G/DL (ref 31.5–35.7)
MCV RBC AUTO: 93 FL (ref 79–97)
MONOCYTES # BLD AUTO: 0.5 X10E3/UL (ref 0.1–0.9)
MONOCYTES NFR BLD AUTO: 7 %
NEUTROPHILS # BLD AUTO: 6.1 X10E3/UL (ref 1.4–7)
NEUTROPHILS NFR BLD AUTO: 73 %
PLATELET # BLD AUTO: 222 X10E3/UL (ref 150–450)
RBC # BLD AUTO: 4.84 X10E6/UL (ref 4.14–5.8)
WBC # BLD AUTO: 8.2 X10E3/UL (ref 3.4–10.8)

## 2023-08-03 LAB
ALBUMIN SERPL-MCNC: 4.7 G/DL (ref 3.8–4.9)
ALBUMIN/CREAT UR: 176 MG/G CREAT (ref 0–29)
ALBUMIN/GLOB SERPL: 1.7 {RATIO} (ref 1.2–2.2)
ALP SERPL-CCNC: 93 IU/L (ref 44–121)
ALT SERPL-CCNC: 19 IU/L (ref 0–44)
AST SERPL-CCNC: 16 IU/L (ref 0–40)
BILIRUB SERPL-MCNC: 0.5 MG/DL (ref 0–1.2)
BUN SERPL-MCNC: 15 MG/DL (ref 6–24)
BUN/CREAT SERPL: 16 (ref 9–20)
CALCIUM SERPL-MCNC: 10.9 MG/DL (ref 8.7–10.2)
CHLORIDE SERPL-SCNC: 95 MMOL/L (ref 96–106)
CHOLEST SERPL-MCNC: 217 MG/DL (ref 100–199)
CO2 SERPL-SCNC: 28 MMOL/L (ref 20–29)
CREAT SERPL-MCNC: 0.91 MG/DL (ref 0.76–1.27)
CREAT UR-MCNC: 95.7 MG/DL
EGFRCR SERPLBLD CKD-EPI 2021: 98 ML/MIN/1.73
GLOBULIN SER CALC-MCNC: 2.7 G/DL (ref 1.5–4.5)
GLUCOSE SERPL-MCNC: 258 MG/DL (ref 70–99)
HDLC SERPL-MCNC: 53 MG/DL
LDLC SERPL CALC-MCNC: 133 MG/DL (ref 0–99)
MICROALBUMIN UR-MCNC: 168.4 UG/ML
POTASSIUM SERPL-SCNC: 4.2 MMOL/L (ref 3.5–5.2)
PROT SERPL-MCNC: 7.4 G/DL (ref 6–8.5)
PSA SERPL-MCNC: <0.1 NG/ML (ref 0–4)
SODIUM SERPL-SCNC: 138 MMOL/L (ref 134–144)
TRIGL SERPL-MCNC: 172 MG/DL (ref 0–149)
VLDLC SERPL CALC-MCNC: 31 MG/DL (ref 5–40)

## 2023-08-03 RX ORDER — LOSARTAN POTASSIUM AND HYDROCHLOROTHIAZIDE 12.5; 5 MG/1; MG/1
TABLET ORAL
Qty: 180 TABLET | Refills: 3 | Status: SHIPPED | OUTPATIENT
Start: 2023-08-03 | End: 2024-08-26

## 2023-08-03 NOTE — TELEPHONE ENCOUNTER
Medicine Refill Request    Last Office Visit: 7/12/2023   Last Consult Visit: 12/17/2021  Last Telemedicine Visit: Visit date not found    Next Appointment: Visit date not found      Current Outpatient Medications:   •  amLODIPine (NORVASC) 5 mg tablet, TAKE 1 TABLET (5 MG TOTAL) BY MOUTH DAILY., Disp: 90 tablet, Rfl: 1  •  doxycycline hyclate (VIBRA-TABS) 100 mg tablet, Take 1 tablet (100 mg total) by mouth 2 (two) times a day., Disp: 56 tablet, Rfl: 0  •  losartan-hydrochlorothiazide (HYZAAR) 50-12.5 mg per tablet, TAKE 2 TABLETS BY MOUTH EVERY DAY, Disp: 180 tablet, Rfl: 3  •  metFORMIN (GLUCOPHAGE) 1,000 mg tablet, Take 1 tablet (1,000 mg total) by mouth 2 (two) times a day with meals., Disp: 180 tablet, Rfl: 0      BP Readings from Last 3 Encounters:   07/12/23 130/80   05/02/23 135/82   12/30/22 (!) 157/93       Recent Lab results:  Lab Results   Component Value Date    CHOL 217 (H) 08/02/2023   ,   Lab Results   Component Value Date    HDL 53 08/02/2023   ,   Lab Results   Component Value Date    LDLCALC 133 (H) 08/02/2023   ,   Lab Results   Component Value Date    TRIG 172 (H) 08/02/2023        Lab Results   Component Value Date    GLUCOSE 258 (H) 08/02/2023   ,   Lab Results   Component Value Date    HGBA1C 10.0 (H) 08/02/2023         Lab Results   Component Value Date    CREATININE 0.91 08/02/2023       Lab Results   Component Value Date    TSH 3.52 12/15/2015           Lab Results   Component Value Date    HGBA1C 10.0 (H) 08/02/2023

## 2023-08-04 NOTE — RESULT ENCOUNTER NOTE
Yes wrote pt [employer] by [employer] message with the results. He also requested med refill.

## 2023-09-07 RX ORDER — METFORMIN HYDROCHLORIDE 1000 MG/1
1000 TABLET ORAL 2 TIMES DAILY WITH MEALS
Qty: 180 TABLET | Refills: 0 | Status: SHIPPED | OUTPATIENT
Start: 2023-09-07 | End: 2023-12-05

## 2023-09-07 NOTE — TELEPHONE ENCOUNTER
Medicine Refill Request    Last Office: 7/12/2023   Last Consult Visit: 12/17/2021  Last Telemedicine Visit: Visit date not found    Next Appointment: Visit date not found      Current Outpatient Medications:     amLODIPine (NORVASC) 5 mg tablet, TAKE 1 TABLET (5 MG TOTAL) BY MOUTH DAILY., Disp: 90 tablet, Rfl: 1    doxycycline hyclate (VIBRA-TABS) 100 mg tablet, Take 1 tablet (100 mg total) by mouth 2 (two) times a day., Disp: 56 tablet, Rfl: 0    losartan-hydrochlorothiazide (HYZAAR) 50-12.5 mg per tablet, TAKE 2 TABLETS BY MOUTH EVERY DAY, Disp: 180 tablet, Rfl: 3    metFORMIN (GLUCOPHAGE) 1,000 mg tablet, Take 1 tablet (1,000 mg total) by mouth 2 (two) times a day with meals., Disp: 180 tablet, Rfl: 0      BP Readings from Last 3 Encounters:   07/12/23 130/80   05/02/23 135/82   12/30/22 (!) 157/93       Recent Lab results:  Lab Results   Component Value Date    CHOL 217 (H) 08/02/2023   ,   Lab Results   Component Value Date    HDL 53 08/02/2023   ,   Lab Results   Component Value Date    LDLCALC 133 (H) 08/02/2023   ,   Lab Results   Component Value Date    TRIG 172 (H) 08/02/2023        Lab Results   Component Value Date    GLUCOSE 258 (H) 08/02/2023   ,   Lab Results   Component Value Date    HGBA1C 10.0 (H) 08/02/2023         Lab Results   Component Value Date    CREATININE 0.91 08/02/2023       Lab Results   Component Value Date    TSH 3.52 12/15/2015

## 2023-12-05 RX ORDER — METFORMIN HYDROCHLORIDE 1000 MG/1
1000 TABLET ORAL 2 TIMES DAILY WITH MEALS
Qty: 180 TABLET | Refills: 0 | Status: SHIPPED | OUTPATIENT
Start: 2023-12-05 | End: 2024-03-06

## 2023-12-05 NOTE — TELEPHONE ENCOUNTER
Medicine Refill Request    Last Office: 7/12/2023   Last Consult Visit: 12/17/2021  Last Telemedicine Visit: Visit date not found    Next Appointment: Visit date not found      Current Outpatient Medications:   •  amLODIPine (NORVASC) 5 mg tablet, TAKE 1 TABLET (5 MG TOTAL) BY MOUTH DAILY., Disp: 90 tablet, Rfl: 1  •  doxycycline hyclate (VIBRA-TABS) 100 mg tablet, Take 1 tablet (100 mg total) by mouth 2 (two) times a day., Disp: 56 tablet, Rfl: 0  •  losartan-hydrochlorothiazide (HYZAAR) 50-12.5 mg per tablet, TAKE 2 TABLETS BY MOUTH EVERY DAY, Disp: 180 tablet, Rfl: 3  •  metFORMIN (GLUCOPHAGE) 1,000 mg tablet, TAKE 1 TABLET BY MOUTH TWICE A DAY WITH MEALS, Disp: 180 tablet, Rfl: 0      BP Readings from Last 3 Encounters:   07/12/23 130/80   05/02/23 135/82   12/30/22 (!) 157/93       Recent Lab results:  Lab Results   Component Value Date    CHOL 217 (H) 08/02/2023   ,   Lab Results   Component Value Date    HDL 53 08/02/2023   ,   Lab Results   Component Value Date    LDLCALC 133 (H) 08/02/2023   ,   Lab Results   Component Value Date    TRIG 172 (H) 08/02/2023        Lab Results   Component Value Date    GLUCOSE 258 (H) 08/02/2023   ,   Lab Results   Component Value Date    HGBA1C 10.0 (H) 08/02/2023         Lab Results   Component Value Date    CREATININE 0.91 08/02/2023       Lab Results   Component Value Date    TSH 3.52 12/15/2015

## 2023-12-17 ENCOUNTER — APPOINTMENT (OUTPATIENT)
Dept: RADIOLOGY | Age: 58
End: 2023-12-17
Attending: INTERNAL MEDICINE
Payer: COMMERCIAL

## 2023-12-17 ENCOUNTER — HOSPITAL ENCOUNTER (OUTPATIENT)
Facility: CLINIC | Age: 58
Discharge: HOME | End: 2023-12-17
Attending: INTERNAL MEDICINE
Payer: COMMERCIAL

## 2023-12-17 VITALS
DIASTOLIC BLOOD PRESSURE: 86 MMHG | TEMPERATURE: 98 F | HEART RATE: 86 BPM | OXYGEN SATURATION: 96 % | SYSTOLIC BLOOD PRESSURE: 146 MMHG

## 2023-12-17 DIAGNOSIS — J20.9 ACUTE BRONCHITIS, UNSPECIFIED ORGANISM: Primary | ICD-10-CM

## 2023-12-17 LAB
FLUAV RNA SPEC QL NAA+PROBE: NEGATIVE
FLUBV RNA SPEC QL NAA+PROBE: NEGATIVE
RSV RNA SPEC QL NAA+PROBE: NEGATIVE
SARS-COV-2 RNA RESP QL NAA+PROBE: NEGATIVE

## 2023-12-17 PROCEDURE — 71046 X-RAY EXAM CHEST 2 VIEWS: CPT | Performed by: INTERNAL MEDICINE

## 2023-12-17 PROCEDURE — 87637 SARSCOV2&INF A&B&RSV AMP PRB: CPT | Performed by: INTERNAL MEDICINE

## 2023-12-17 PROCEDURE — S9088 SERVICES PROVIDED IN URGENT: HCPCS | Performed by: INTERNAL MEDICINE

## 2023-12-17 PROCEDURE — 99213 OFFICE O/P EST LOW 20 MIN: CPT | Performed by: INTERNAL MEDICINE

## 2023-12-17 RX ORDER — ALBUTEROL SULFATE 90 UG/1
2 INHALANT RESPIRATORY (INHALATION) EVERY 4 HOURS PRN
Qty: 1 EACH | Refills: 0 | Status: SHIPPED | OUTPATIENT
Start: 2023-12-17 | End: 2024-09-13

## 2023-12-17 RX ORDER — TOBRAMYCIN 3 MG/ML
2 SOLUTION/ DROPS OPHTHALMIC
Qty: 5 ML | Refills: 0 | Status: SHIPPED | OUTPATIENT
Start: 2023-12-17 | End: 2023-12-24

## 2023-12-17 RX ORDER — PREDNISONE 5 MG/1
40 TABLET ORAL ONCE
Status: COMPLETED | OUTPATIENT
Start: 2023-12-17 | End: 2023-12-17

## 2023-12-17 RX ORDER — PREDNISONE 20 MG/1
20 TABLET ORAL 2 TIMES DAILY
Qty: 10 TABLET | Refills: 0 | Status: SHIPPED | OUTPATIENT
Start: 2023-12-17 | End: 2024-09-13

## 2023-12-17 RX ADMIN — PREDNISONE 40 MG: 5 TABLET ORAL at 10:09

## 2023-12-17 ASSESSMENT — ENCOUNTER SYMPTOMS
COUGH: 1
SORE THROAT: 1
WHEEZING: 1
FATIGUE: 0
HEADACHES: 0
MYALGIAS: 0
FEVER: 0
VOMITING: 0
EYE DISCHARGE: 1
SHORTNESS OF BREATH: 0
DIARRHEA: 0
CHILLS: 0

## 2023-12-17 NOTE — DISCHARGE INSTRUCTIONS
Chest x-ray done in the office today showed no evidence of acute cardiopulmonary disease.  There were some reactive airways on your examination.  Please use the albuterol 2 puffs every 4 hours as needed for cough or wheezing.  I am prescribing an inhalational spacing device to use as well to ensure that you are getting the medication into your lungs where we want to go.    I will attach her x-ray report to her discharge papers.    In the office you were given prednisone 40 mg.  Please have something to eat in the next 30 to 60 minutes.  Resume the prednisone prescription tomorrow and take twice daily with food as directed.    We are sending a COVID super swab.  Results should be available in the next day.  If you do not have the [employer] application I would download this as you will likely find the results sooner.    I am prescribing drops for your eyes to use every 4 hours while awake.  Warm compresses can provide some relief as well.  Make sure that you are hydrating well and getting plenty of fluids.  Please follow-up with your primary care doctor in the next week with any ongoing symptoms or concerns.

## 2023-12-17 NOTE — ED PROVIDER NOTES
History  Chief Complaint   Patient presents with   • Cough     Cough and congestion started 4 days ago.      NKDA  Covid test negative x 1  + flu vaccine, covid booster      History provided by:  Patient   used: No    Cough  Cough characteristics:  Productive  Sputum characteristics:  Yellow and green  Onset quality:  Sudden  Duration:  4 days  Chronicity:  New  Smoker: no    Relieved by:  Cough suppressants (OTC meds)  Worsened by:  Nothing  Associated symptoms: eye discharge, sore throat and wheezing    Associated symptoms: no chills, no ear pain, no fever, no headaches, no myalgias, no rash and no shortness of breath        Past Medical History:   Diagnosis Date   • Arthritis     knees   • COVID-19 vaccine series completed     pfizer plus booster   • Diabetes (CMS/HCC)    • Eye injury     Right eye pressure injury   • Hypertension    • Migraine     hasnt had one in approx 2 yrs -2019   • Pulmonary nodules 12/16/2021   • Type 2 diabetes mellitus (CMS/HCC)        Past Surgical History:   Procedure Laterality Date   • COLECTOMY     • COLONOSCOPY  2021       Family History   Problem Relation Age of Onset   • Other Biological Mother         pacemaker   • Stroke Biological Father    • Diabetes Biological Father    • Heart failure Biological Father    • No Known Problems Biological Sister    • Lung cancer Maternal Grandfather         Smoker 50 years    • Colon cancer Neg Hx    • Rectal cancer Neg Hx        Social History     Tobacco Use   • Smoking status: Never   • Smokeless tobacco: Never   Vaping Use   • Vaping Use: Never used   Substance Use Topics   • Alcohol use: Not Currently   • Drug use: Never       Review of Systems   Constitutional: Negative for chills, fatigue and fever.   HENT: Positive for sore throat. Negative for ear pain.    Eyes: Positive for discharge.        No contacts   Respiratory: Positive for cough and wheezing. Negative for shortness of breath.    Cardiovascular: Negative  for leg swelling.   Gastrointestinal: Negative for diarrhea and vomiting.   Musculoskeletal: Negative for myalgias.   Skin: Negative for rash.   Neurological: Negative for headaches.       Physical Exam  ED Triage Vitals [12/17/23 0922]   Temp Heart Rate Resp BP SpO2   36.7 °C (98 °F) 86 -- (!) 161/98 96 %      Temp src Heart Rate Source Patient Position BP Location FiO2 (%) (Set)   -- Monitor Sitting Left upper arm --       Physical Exam  Vitals reviewed.   Constitutional:       General: He is not in acute distress.     Appearance: He is obese. He is not toxic-appearing or diaphoretic.   HENT:      Right Ear: There is impacted cerumen.      Mouth/Throat:      Mouth: Mucous membranes are moist.      Pharynx: No oropharyngeal exudate or posterior oropharyngeal erythema.   Eyes:      Extraocular Movements: Extraocular movements intact.      Pupils: Pupils are equal, round, and reactive to light.   Cardiovascular:      Rate and Rhythm: Normal rate and regular rhythm.      Heart sounds: No murmur heard.  Pulmonary:      Effort: Pulmonary effort is normal. No respiratory distress.      Breath sounds: No wheezing or rales.      Comments: There is no hyphema or photosensitivity.  There is mild scleral injection left greater than right.  There is bilateral palpebral conjunctival erythema present.    There is however, a wheezy cough and some forced expiratory wheezing present.  Musculoskeletal:      Right lower leg: No edema.      Left lower leg: No edema.   Skin:     General: Skin is warm and dry.      Findings: No rash.   Neurological:      General: No focal deficit present.      Mental Status: He is alert and oriented to person, place, and time.      Gait: Gait normal.   Psychiatric:         Mood and Affect: Mood normal.         Behavior: Behavior normal.           Procedures  Procedures    UC Course       Medical Decision Making  Chest x-ray was stable.  Sending a COVID super swab.  Starting bronchodilator and  corticosteroid therapy.  Please see disposition for care plan.                   Goran Guzman,   12/17/23 1038

## 2024-03-04 NOTE — TELEPHONE ENCOUNTER
Requesting refill of glucophage       Medicine Refill Request    Last Office Visit: 7/12/2023   Last Consult Visit: 12/17/2021  Last Telemedicine Visit: Visit date not found    Next Appointment: Visit date not found      Current Outpatient Medications:   •  albuterol HFA 90 mcg/actuation inhaler, Inhale 2 puffs every 4 (four) hours as needed for wheezing., Disp: 1 each, Rfl: 0  •  amLODIPine (NORVASC) 5 mg tablet, TAKE 1 TABLET (5 MG TOTAL) BY MOUTH DAILY., Disp: 90 tablet, Rfl: 1  •  inhalational spacing device spacer, 1 each every 4 (four) hours as needed (with inhaler)., Disp: 1 each, Rfl: 0  •  losartan-hydrochlorothiazide (HYZAAR) 50-12.5 mg per tablet, TAKE 2 TABLETS BY MOUTH EVERY DAY, Disp: 180 tablet, Rfl: 3  •  metFORMIN (GLUCOPHAGE) 1,000 mg tablet, TAKE 1 TABLET BY MOUTH TWICE A DAY WITH FOOD, Disp: 180 tablet, Rfl: 0  •  predniSONE (DELTASONE) 20 mg tablet, Take 1 tablet (20 mg total) by mouth 2 (two) times a day for 5 days., Disp: 10 tablet, Rfl: 0      BP Readings from Last 3 Encounters:   12/17/23 (!) 146/86   07/12/23 130/80   05/02/23 135/82       Recent Lab results:  Lab Results   Component Value Date    CHOL 217 (H) 08/02/2023   ,   Lab Results   Component Value Date    HDL 53 08/02/2023   ,   Lab Results   Component Value Date    LDLCALC 133 (H) 08/02/2023   ,   Lab Results   Component Value Date    TRIG 172 (H) 08/02/2023        Lab Results   Component Value Date    GLUCOSE 258 (H) 08/02/2023   ,   Lab Results   Component Value Date    HGBA1C 10.0 (H) 08/02/2023         Lab Results   Component Value Date    CREATININE 0.91 08/02/2023       Lab Results   Component Value Date    TSH 3.52 12/15/2015           Lab Results   Component Value Date    HGBA1C 10.0 (H) 08/02/2023

## 2024-03-06 RX ORDER — METFORMIN HYDROCHLORIDE 1000 MG/1
1000 TABLET ORAL 2 TIMES DAILY WITH MEALS
Qty: 60 TABLET | Refills: 0 | Status: SHIPPED | OUTPATIENT
Start: 2024-03-06 | End: 2024-09-13 | Stop reason: SDUPTHER

## 2024-04-01 NOTE — TELEPHONE ENCOUNTER
Medicine Refill Request    Last Office Visit: 7/12/2023   Last Consult Visit: 12/17/2021  Last Telemedicine Visit: Visit date not found    Next Appointment: Visit date not found      Current Outpatient Medications:     albuterol HFA 90 mcg/actuation inhaler, Inhale 2 puffs every 4 (four) hours as needed for wheezing., Disp: 1 each, Rfl: 0    amLODIPine (NORVASC) 5 mg tablet, TAKE 1 TABLET (5 MG TOTAL) BY MOUTH DAILY., Disp: 90 tablet, Rfl: 1    inhalational spacing device spacer, 1 each every 4 (four) hours as needed (with inhaler)., Disp: 1 each, Rfl: 0    losartan-hydrochlorothiazide (HYZAAR) 50-12.5 mg per tablet, TAKE 2 TABLETS BY MOUTH EVERY DAY, Disp: 180 tablet, Rfl: 3    metFORMIN (GLUCOPHAGE) 1,000 mg tablet, TAKE 1 TABLET BY MOUTH TWICE A DAY WITH FOOD, Disp: 60 tablet, Rfl: 0    predniSONE (DELTASONE) 20 mg tablet, Take 1 tablet (20 mg total) by mouth 2 (two) times a day for 5 days., Disp: 10 tablet, Rfl: 0      BP Readings from Last 3 Encounters:   12/17/23 (!) 146/86   07/12/23 130/80   05/02/23 135/82       Recent Lab results:  Lab Results   Component Value Date    CHOL 217 (H) 08/02/2023   ,   Lab Results   Component Value Date    HDL 53 08/02/2023   ,   Lab Results   Component Value Date    LDLCALC 133 (H) 08/02/2023   ,   Lab Results   Component Value Date    TRIG 172 (H) 08/02/2023        Lab Results   Component Value Date    GLUCOSE 258 (H) 08/02/2023   ,   Lab Results   Component Value Date    HGBA1C 10.0 (H) 08/02/2023         Lab Results   Component Value Date    CREATININE 0.91 08/02/2023       Lab Results   Component Value Date    TSH 3.52 12/15/2015           Lab Results   Component Value Date    HGBA1C 10.0 (H) 08/02/2023

## 2024-04-02 RX ORDER — METFORMIN HYDROCHLORIDE 1000 MG/1
1000 TABLET ORAL 2 TIMES DAILY WITH MEALS
Qty: 60 TABLET | Refills: 0 | OUTPATIENT
Start: 2024-04-02

## 2024-08-26 RX ORDER — LOSARTAN POTASSIUM AND HYDROCHLOROTHIAZIDE 12.5; 5 MG/1; MG/1
TABLET ORAL
Qty: 180 TABLET | Refills: 0 | Status: SHIPPED | OUTPATIENT
Start: 2024-08-26 | End: 2024-12-02

## 2024-08-26 NOTE — TELEPHONE ENCOUNTER
Medicine Refill Request    Last Office Visit: 7/12/2023   Last Consult Visit: 12/17/2021  Last Telemedicine Visit: Visit date not found    Next Appointment: Visit date not found      Current Outpatient Medications:     albuterol HFA 90 mcg/actuation inhaler, Inhale 2 puffs every 4 (four) hours as needed for wheezing., Disp: 1 each, Rfl: 0    amLODIPine (NORVASC) 5 mg tablet, TAKE 1 TABLET (5 MG TOTAL) BY MOUTH DAILY., Disp: 90 tablet, Rfl: 0    inhalational spacing device spacer, 1 each every 4 (four) hours as needed (with inhaler)., Disp: 1 each, Rfl: 0    losartan-hydrochlorothiazide (HYZAAR) 50-12.5 mg per tablet, TAKE 2 TABLETS BY MOUTH EVERY DAY, Disp: 180 tablet, Rfl: 3    metFORMIN (GLUCOPHAGE) 1,000 mg tablet, TAKE 1 TABLET BY MOUTH TWICE A DAY WITH FOOD, Disp: 60 tablet, Rfl: 0    predniSONE (DELTASONE) 20 mg tablet, Take 1 tablet (20 mg total) by mouth 2 (two) times a day for 5 days., Disp: 10 tablet, Rfl: 0      BP Readings from Last 3 Encounters:   12/17/23 (!) 146/86   07/12/23 130/80   05/02/23 135/82       Recent Lab results:  Lab Results   Component Value Date    CHOL 217 (H) 08/02/2023   ,   Lab Results   Component Value Date    HDL 53 08/02/2023   ,   Lab Results   Component Value Date    LDLCALC 133 (H) 08/02/2023   ,   Lab Results   Component Value Date    TRIG 172 (H) 08/02/2023        Lab Results   Component Value Date    GLUCOSE 258 (H) 08/02/2023   ,   Lab Results   Component Value Date    HGBA1C 10.0 (H) 08/02/2023         Lab Results   Component Value Date    CREATININE 0.91 08/02/2023       Lab Results   Component Value Date    TSH 3.52 12/15/2015           Lab Results   Component Value Date    HGBA1C 10.0 (H) 08/02/2023       Lab Results   Component Value Date    EGFR 98 08/02/2023    EGFR >60.0 01/05/2022    EGFR >60.0 01/04/2022

## 2024-09-13 ENCOUNTER — OFFICE VISIT (OUTPATIENT)
Dept: PRIMARY CARE | Facility: CLINIC | Age: 59
End: 2024-09-13
Payer: COMMERCIAL

## 2024-09-13 VITALS
SYSTOLIC BLOOD PRESSURE: 138 MMHG | BODY MASS INDEX: 37.51 KG/M2 | HEART RATE: 134 BPM | RESPIRATION RATE: 16 BRPM | TEMPERATURE: 98.4 F | HEIGHT: 76 IN | WEIGHT: 308 LBS | DIASTOLIC BLOOD PRESSURE: 82 MMHG | OXYGEN SATURATION: 98 %

## 2024-09-13 DIAGNOSIS — Z00.00 ENCOUNTER FOR GENERAL ADULT MEDICAL EXAMINATION WITHOUT ABNORMAL FINDINGS: Primary | ICD-10-CM

## 2024-09-13 DIAGNOSIS — E11.65 TYPE 2 DIABETES MELLITUS WITH HYPERGLYCEMIA, WITHOUT LONG-TERM CURRENT USE OF INSULIN (CMS/HCC): ICD-10-CM

## 2024-09-13 DIAGNOSIS — R00.0 TACHYCARDIA: ICD-10-CM

## 2024-09-13 DIAGNOSIS — Z12.5 SCREENING PSA (PROSTATE SPECIFIC ANTIGEN): ICD-10-CM

## 2024-09-13 DIAGNOSIS — C20 RECTAL CANCER (CMS/HCC): ICD-10-CM

## 2024-09-13 DIAGNOSIS — E78.5 ELEVATED LIPIDS: ICD-10-CM

## 2024-09-13 DIAGNOSIS — I10 PRIMARY HYPERTENSION: ICD-10-CM

## 2024-09-13 PROCEDURE — 3075F SYST BP GE 130 - 139MM HG: CPT | Performed by: FAMILY MEDICINE

## 2024-09-13 PROCEDURE — 99396 PREV VISIT EST AGE 40-64: CPT | Performed by: FAMILY MEDICINE

## 2024-09-13 PROCEDURE — 3008F BODY MASS INDEX DOCD: CPT | Performed by: FAMILY MEDICINE

## 2024-09-13 PROCEDURE — 93000 ELECTROCARDIOGRAM COMPLETE: CPT | Performed by: FAMILY MEDICINE

## 2024-09-13 PROCEDURE — 3079F DIAST BP 80-89 MM HG: CPT | Performed by: FAMILY MEDICINE

## 2024-09-13 RX ORDER — METFORMIN HYDROCHLORIDE 1000 MG/1
1000 TABLET ORAL 2 TIMES DAILY WITH MEALS
Qty: 180 TABLET | Refills: 0 | Status: SHIPPED | OUTPATIENT
Start: 2024-09-13 | End: 2024-12-04

## 2024-09-13 ASSESSMENT — ENCOUNTER SYMPTOMS
WEAKNESS: 0
COUGH: 0
CONSTIPATION: 0
BLOOD IN STOOL: 0
VOMITING: 0
FEVER: 0
DIZZINESS: 0
SHORTNESS OF BREATH: 0
DYSURIA: 0
POLYDIPSIA: 0
SORE THROAT: 0
WHEEZING: 0
CHILLS: 0
STRIDOR: 0
DYSPHORIC MOOD: 0
DIARRHEA: 0
CONFUSION: 0
NUMBNESS: 0
POLYPHAGIA: 0
HEMATURIA: 0
NAUSEA: 0
ABDOMINAL PAIN: 0
PALPITATIONS: 0
HEADACHES: 0

## 2024-09-13 ASSESSMENT — PATIENT HEALTH QUESTIONNAIRE - PHQ9: SUM OF ALL RESPONSES TO PHQ9 QUESTIONS 1 & 2: 0

## 2024-09-13 NOTE — PROGRESS NOTES
"    Subjective      Patient ID: Lake Martin is a 58 y.o. male.  1965      epp        The following have been reviewed and updated as appropriate in this visit:   Allergies  Meds  Problems       Review of Systems   Constitutional:  Negative for chills and fever.   HENT:  Negative for dental problem, hearing loss and sore throat.    Eyes:  Negative for visual disturbance.   Respiratory:  Negative for cough, shortness of breath, wheezing and stridor.    Cardiovascular:  Negative for chest pain, palpitations and leg swelling.   Gastrointestinal:  Negative for abdominal pain, blood in stool, constipation, diarrhea, nausea and vomiting.   Endocrine: Negative for cold intolerance, heat intolerance, polydipsia, polyphagia and polyuria.   Genitourinary:  Negative for dysuria, hematuria, penile discharge, penile pain, penile swelling, scrotal swelling and testicular pain.   Musculoskeletal:  Negative for gait problem.   Skin:  Negative for rash.   Neurological:  Negative for dizziness, syncope, weakness, numbness and headaches.   Psychiatric/Behavioral:  Negative for confusion and dysphoric mood.    All other systems reviewed and are negative.      Objective     Vitals:    09/13/24 0822   BP: 138/82   BP Location: Left upper arm   Patient Position: Sitting   Pulse: (!) 134   Resp: 16   Temp: 36.9 °C (98.4 °F)   TempSrc: Temporal   SpO2: 98%   Weight: (!) 140 kg (308 lb)   Height: 1.93 m (6' 4\")     Body mass index is 37.49 kg/m².    Physical Exam  Vitals and nursing note reviewed.   Constitutional:       Appearance: Normal appearance. He is well-developed.   HENT:      Head: Normocephalic and atraumatic.      Right Ear: Tympanic membrane, ear canal and external ear normal.      Left Ear: Tympanic membrane, ear canal and external ear normal.      Nose: Nose normal.      Mouth/Throat:      Mouth: Mucous membranes are moist.      Pharynx: Oropharynx is clear.   Eyes:      General: No scleral icterus.     " Extraocular Movements: Extraocular movements intact.      Conjunctiva/sclera: Conjunctivae normal.      Pupils: Pupils are equal, round, and reactive to light.   Neck:      Thyroid: No thyromegaly.      Vascular: No JVD.      Trachea: No tracheal deviation.   Cardiovascular:      Rate and Rhythm: Regular rhythm. Tachycardia present.      Heart sounds: Normal heart sounds.   Pulmonary:      Effort: Pulmonary effort is normal. No respiratory distress.      Breath sounds: Normal breath sounds. No stridor.   Abdominal:      General: Bowel sounds are normal. There is no distension.      Palpations: Abdomen is soft. There is no mass.      Tenderness: There is no abdominal tenderness. There is no guarding or rebound.      Hernia: No hernia is present.   Musculoskeletal:         General: Normal range of motion.      Cervical back: Normal range of motion and neck supple.      Right lower leg: No edema.      Left lower leg: No edema.   Lymphadenopathy:      Cervical: No cervical adenopathy.   Skin:     General: Skin is warm and dry.      Findings: No rash.   Neurological:      General: No focal deficit present.      Mental Status: He is alert and oriented to person, place, and time.   Psychiatric:         Mood and Affect: Mood normal.         Behavior: Behavior normal.         Assessment/Plan   Diagnoses and all orders for this visit:    Encounter for general adult medical examination without abnormal findings (Primary)    Type 2 diabetes mellitus with hyperglycemia, without long-term current use of insulin (CMS/[facility])  -     CBC and differential; Future  -     Comprehensive metabolic panel; Future  -     Hemoglobin A1c; Future  -     Lipid panel; Future  -     Microalbumin / creatinine urine ratio; Future    Elevated lipids  -     CBC and differential; Future  -     Comprehensive metabolic panel; Future  -     Lipid panel; Future  -     TSH; Future    Screening PSA (prostate specific antigen)  -     PSA; Future    Rectal  cancer (CMS/HCC)    Primary hypertension    Tachycardia  Comments:  ekg  ?junctional tachycardia  Card consult  Orders:  -     ECG 12 LEAD-OFFICE PERFORMED  -     Ambulatory referral to Cardiology; Future

## 2024-09-26 RX ORDER — AMLODIPINE BESYLATE 5 MG/1
5 TABLET ORAL DAILY
Qty: 90 TABLET | Refills: 3 | Status: ON HOLD | OUTPATIENT
Start: 2024-09-26 | End: 2025-02-03

## 2024-09-26 NOTE — TELEPHONE ENCOUNTER
Medicine Refill Request    Last Office Visit: 9/13/2024   Last Consult Visit: 12/17/2021  Last Telemedicine Visit: Visit date not found    Next Appointment: 3/14/2025      Current Outpatient Medications:     amLODIPine (NORVASC) 5 mg tablet, TAKE 1 TABLET (5 MG TOTAL) BY MOUTH DAILY., Disp: 90 tablet, Rfl: 0    losartan-hydrochlorothiazide (HYZAAR) 50-12.5 mg per tablet, TAKE 2 TABLETS BY MOUTH EVERY DAY, Disp: 180 tablet, Rfl: 0    metFORMIN (GLUCOPHAGE) 1,000 mg tablet, Take 1 tablet (1,000 mg total) by mouth 2 (two) times a day with meals., Disp: 180 tablet, Rfl: 0      BP Readings from Last 3 Encounters:   09/13/24 138/82   12/17/23 (!) 146/86   07/12/23 130/80       Recent Lab results:  Lab Results   Component Value Date    CHOL 217 (H) 08/02/2023   ,   Lab Results   Component Value Date    HDL 53 08/02/2023   ,   Lab Results   Component Value Date    LDLCALC 133 (H) 08/02/2023   ,   Lab Results   Component Value Date    TRIG 172 (H) 08/02/2023        Lab Results   Component Value Date    GLUCOSE 258 (H) 08/02/2023   ,   Lab Results   Component Value Date    HGBA1C 10.0 (H) 08/02/2023         Lab Results   Component Value Date    CREATININE 0.91 08/02/2023       Lab Results   Component Value Date    TSH 3.52 12/15/2015           Lab Results   Component Value Date    HGBA1C 10.0 (H) 08/02/2023       Lab Results   Component Value Date    EGFR 98 08/02/2023    EGFR >60.0 01/05/2022    EGFR >60.0 01/04/2022

## 2024-12-02 RX ORDER — LOSARTAN POTASSIUM AND HYDROCHLOROTHIAZIDE 12.5; 5 MG/1; MG/1
TABLET ORAL
Qty: 180 TABLET | Refills: 1 | Status: ON HOLD | OUTPATIENT
Start: 2024-12-02 | End: 2025-02-03

## 2024-12-03 ENCOUNTER — TELEPHONE (OUTPATIENT)
Dept: SURGERY | Facility: CLINIC | Age: 59
End: 2024-12-03
Payer: COMMERCIAL

## 2024-12-03 NOTE — TELEPHONE ENCOUNTER
Dr. Krishna office reached out to schedule a colonoscopy appointment with Dr. Martinez, called and left voice mail for Lake to call office back to set up consultation visit.

## 2024-12-04 ENCOUNTER — TELEPHONE (OUTPATIENT)
Dept: PRIMARY CARE | Facility: CLINIC | Age: 59
End: 2024-12-04
Payer: COMMERCIAL

## 2024-12-04 RX ORDER — METFORMIN HYDROCHLORIDE 1000 MG/1
1000 TABLET ORAL 2 TIMES DAILY WITH MEALS
Qty: 180 TABLET | Refills: 0 | Status: ON HOLD | OUTPATIENT
Start: 2024-12-04 | End: 2025-02-03

## 2024-12-04 NOTE — TELEPHONE ENCOUNTER
Pt called office prior to picking out a new insurance package for the year.     Wants to know if we will be participating with [employer] my blue access ppo     Pt wants an answer asap     [phone]

## 2024-12-04 NOTE — TELEPHONE ENCOUNTER
Medicine Refill Request    Last Office Visit: 9/13/2024   Last Consult Visit: 12/17/2021  Last Telemedicine Visit: Visit date not found    Next Appointment: 3/14/2025      Current Outpatient Medications:     amLODIPine (NORVASC) 5 mg tablet, TAKE 1 TABLET (5 MG TOTAL) BY MOUTH DAILY., Disp: 90 tablet, Rfl: 3    losartan-hydrochlorothiazide (HYZAAR) 50-12.5 mg per tablet, TAKE 2 TABLETS BY MOUTH EVERY DAY, Disp: 180 tablet, Rfl: 1    metFORMIN (GLUCOPHAGE) 1,000 mg tablet, Take 1 tablet (1,000 mg total) by mouth 2 (two) times a day with meals., Disp: 180 tablet, Rfl: 0      BP Readings from Last 3 Encounters:   09/13/24 138/82   12/17/23 (!) 146/86   07/12/23 130/80       Recent Lab results:  Lab Results   Component Value Date    CHOL 217 (H) 08/02/2023   ,   Lab Results   Component Value Date    HDL 53 08/02/2023   ,   Lab Results   Component Value Date    LDLCALC 133 (H) 08/02/2023   ,   Lab Results   Component Value Date    TRIG 172 (H) 08/02/2023        Lab Results   Component Value Date    GLUCOSE 258 (H) 08/02/2023   ,   Lab Results   Component Value Date    HGBA1C 10.0 (H) 08/02/2023         Lab Results   Component Value Date    CREATININE 0.91 08/02/2023       Lab Results   Component Value Date    TSH 3.52 12/15/2015           Lab Results   Component Value Date    HGBA1C 10.0 (H) 08/02/2023       Lab Results   Component Value Date    EGFR 98 08/02/2023    EGFR >60.0 01/05/2022    EGFR >60.0 01/04/2022

## 2024-12-04 NOTE — PROGRESS NOTES
Patient ID: Lake Martin                              : 1965  MRN: [id number]                                            Visit Date: 2024  Encounter Provider: Tiarra Martinez  Referring Provider: Goran Krishna MD    Subjective   Chief Complaint: Follow-up (Pt is here to schedule a colonoscopy. )    Lake Martin is a 59 y.o. old male presenting today for a colonoscopy consult. He reports feeling well. He reports having incontinence of bowels once a month since the surgery. His previous colonoscopy was 2 years ago. Tolerating regular diet and bowel movements. No recent weight loss. No blood in the stool. No nausea or vomiting. No fever or chills. No shortness of breathe. He had rectal cancer. No family history of colon cancer. No personal or family history of ulcerative colitis or crohn's disease. No allergies to medications. He previously had an LAR. Type 2 diabetes and hypertension. No blood thinners. No smoking or drinking. Review of systems otherwise negative.     Previous Visit 10/14/2022:  Lake Martin is a 56 y.o. old male presenting today for a surveillance follow-up visit.  He underwent a robotic low anterior resection for mid to low rectal cancer in 2021.  Final pathologic stage being PT2N1A with 1 out of 78 lymph nodes involved.  Postoperatively he did undergo chemoradiation and finished treatments in 2022.  He reports feeling well.  Denies any abdominal pain.  Has good control over his bowel movements.  No fevers or chills.  Tolerating a regular diet without nausea vomiting.  Review of systems otherwise negative.      Medications:   Current Outpatient Medications:     amLODIPine (NORVASC) 5 mg tablet, TAKE 1 TABLET (5 MG TOTAL) BY MOUTH DAILY., Disp: 90 tablet, Rfl: 3    losartan-hydrochlorothiazide (HYZAAR) 50-12.5 mg per tablet, TAKE 2 TABLETS BY MOUTH EVERY DAY, Disp: 180 tablet, Rfl: 1    metFORMIN (GLUCOPHAGE) 1,000 mg tablet, TAKE 1 TABLET BY  "MOUTH TWICE A DAY WITH MEALS, Disp: 180 tablet, Rfl: 0    Past Medical History:  has a past medical history of Arthritis, COVID-19 vaccine series completed, Diabetes (CMS/[facility]), Eye injury, Hypertension, Migraine, Pulmonary nodules (12/16/2021), and Type 2 diabetes mellitus (CMS/[facility]).    He has no past medical history of History of transfusion.  Past Surgical History:  has a past surgical history that includes Colonoscopy (2021) and Colectomy.  Social History:   Social History     Tobacco Use    Smoking status: Never    Smokeless tobacco: Never   Vaping Use    Vaping status: Never Used   Substance Use Topics    Alcohol use: Not Currently    Drug use: Never      Family History: family history includes Diabetes in his biological father; Heart failure in his biological father; Lung cancer in his maternal grandfather; No Known Problems in his biological sister; Other in his biological mother; Stroke in his biological father.   Allergies: has No Known Allergies.     Review of Systems  The following have been reviewed and updated as appropriate in this visit:          Objective   Vitals: Visit Vitals  Ht 1.93 m (6' 4\")   Wt (!) 140 kg (307 lb 12.8 oz)   BMI 37.47 kg/m²     Physical Exam  Vitals reviewed.   Constitutional:       Appearance: Normal appearance.   HENT:      Head: Normocephalic and atraumatic.   Eyes:      Conjunctiva/sclera: Conjunctivae normal.   Cardiovascular:      Rate and Rhythm: Normal rate.   Pulmonary:      Effort: Pulmonary effort is normal.   Abdominal:      Palpations: Abdomen is soft.      Comments: Abdomen soft, non-tender, non-distended. Surgical incisions healed well.       Musculoskeletal:      Cervical back: Neck supple.   Skin:     General: Skin is warm and dry.      Capillary Refill: Capillary refill takes less than 2 seconds.   Neurological:      General: No focal deficit present.      Mental Status: He is oriented to person, place, and time.   Psychiatric:         Mood and Affect: Mood " normal.         Behavior: Behavior normal.            Assessment/Plan   Problem List Items Addressed This Visit    None     and I discussed the procedure of a colonoscopy in brief including risk, benefits, complications and alternatives.  He showed good understanding of our discussion and asked questions which I answered to his satisfaction.  We shall be scheduling him for a colonoscopy at his earliest convenience. He will be getting a CT scan soon with his oncologist. He knows to call the office with any other questions or concerns.     By signing my name below, I, Cierra Breaux, attest that this documentation has been prepared under the direction and in the presence of Tiarra Martinez MD      12/6/2024 8:59 AM    History of rectal cancer  LAR December 2021  Here to set up colonoscopy  Has maintained surveillance with oncology  Will set up colonoscopy             Cierar Breaux

## 2024-12-06 ENCOUNTER — OFFICE VISIT (OUTPATIENT)
Dept: SURGERY | Facility: CLINIC | Age: 59
End: 2024-12-06
Payer: COMMERCIAL

## 2024-12-06 VITALS
HEART RATE: 81 BPM | WEIGHT: 307.8 LBS | TEMPERATURE: 97.7 F | BODY MASS INDEX: 37.48 KG/M2 | SYSTOLIC BLOOD PRESSURE: 138 MMHG | HEIGHT: 76 IN | DIASTOLIC BLOOD PRESSURE: 92 MMHG | OXYGEN SATURATION: 98 %

## 2024-12-06 DIAGNOSIS — C20 RECTAL CANCER (CMS/HCC): Primary | ICD-10-CM

## 2024-12-06 PROCEDURE — 3075F SYST BP GE 130 - 139MM HG: CPT | Performed by: SURGERY

## 2024-12-06 PROCEDURE — 3080F DIAST BP >= 90 MM HG: CPT | Performed by: SURGERY

## 2024-12-06 PROCEDURE — 99213 OFFICE O/P EST LOW 20 MIN: CPT | Performed by: SURGERY

## 2024-12-06 PROCEDURE — 3008F BODY MASS INDEX DOCD: CPT | Performed by: SURGERY

## 2025-01-15 ENCOUNTER — DOCUMENTATION (OUTPATIENT)
Dept: SURGERY | Facility: CLINIC | Age: 60
End: 2025-01-15
Payer: COMMERCIAL

## 2025-01-15 NOTE — PROGRESS NOTES
I called [facility] ([phone]) regarding #[id number] Dx: C20.  I s/w Nagi and was told preauth was not needed.    Call ref # [id number].   I gave the information to the patient.  GILBERTO Petit

## 2025-01-27 PROCEDURE — 45384 COLONOSCOPY W/LESION REMOVAL: CPT | Performed by: SURGERY

## 2025-01-31 ENCOUNTER — HOSPITAL ENCOUNTER (INPATIENT)
Facility: HOSPITAL | Age: 60
LOS: 5 days | Discharge: HOME | DRG: 854 | End: 2025-02-05
Attending: EMERGENCY MEDICINE | Admitting: HOSPITALIST
Payer: COMMERCIAL

## 2025-01-31 DIAGNOSIS — I48.91 NEW ONSET ATRIAL FIBRILLATION (CMS/HCC): ICD-10-CM

## 2025-01-31 DIAGNOSIS — M00.061 STAPHYLOCOCCAL ARTHRITIS OF RIGHT KNEE (CMS/HCC): ICD-10-CM

## 2025-01-31 DIAGNOSIS — I48.91 ATRIAL FIBRILLATION WITH RAPID VENTRICULAR RESPONSE (CMS/HCC): ICD-10-CM

## 2025-01-31 DIAGNOSIS — Z01.818 PRE-OP EVALUATION: Primary | ICD-10-CM

## 2025-01-31 DIAGNOSIS — Z96.659 INFECTION OF PROSTHETIC KNEE JOINT, INITIAL ENCOUNTER  (CMS/HCC): ICD-10-CM

## 2025-01-31 DIAGNOSIS — T84.59XA INFECTION OF PROSTHETIC KNEE JOINT, INITIAL ENCOUNTER  (CMS/HCC): ICD-10-CM

## 2025-01-31 LAB
ALBUMIN SERPL-MCNC: 3.2 G/DL (ref 3.5–5.7)
ALBUMIN SERPL-MCNC: 3.2 G/DL (ref 3.5–5.7)
ALP SERPL-CCNC: 71 IU/L (ref 34–125)
ALT SERPL-CCNC: 10 IU/L (ref 7–52)
ANION GAP SERPL CALC-SCNC: 12 MEQ/L (ref 3–15)
APTT PPP: 27 SEC (ref 23–35)
AST SERPL-CCNC: 16 IU/L (ref 13–39)
BASOPHILS # BLD: 0.04 K/UL (ref 0.01–0.1)
BASOPHILS NFR BLD: 0.2 %
BILIRUB SERPL-MCNC: 1.6 MG/DL (ref 0.3–1.2)
BUN SERPL-MCNC: 21 MG/DL (ref 7–25)
CALCIUM SERPL-MCNC: 10.6 MG/DL (ref 8.6–10.3)
CHLORIDE SERPL-SCNC: 94 MEQ/L (ref 98–107)
CO2 SERPL-SCNC: 27 MEQ/L (ref 21–31)
CREAT SERPL-MCNC: 1.4 MG/DL (ref 0.7–1.3)
CRP SERPL-MCNC: 242.1 MG/L
CRP SERPL-MCNC: 247 MG/L
DIFFERENTIAL METHOD BLD: ABNORMAL
EGFRCR SERPLBLD CKD-EPI 2021: 57.5 ML/MIN/1.73M*2
EOSINOPHIL # BLD: 0.01 K/UL (ref 0.04–0.54)
EOSINOPHIL NFR BLD: 0.1 %
ERYTHROCYTE [DISTWIDTH] IN BLOOD BY AUTOMATED COUNT: 12.8 % (ref 11.6–14.4)
ERYTHROCYTE [SEDIMENTATION RATE] IN BLOOD BY WESTERGREN METHOD: 118 MM/HR
GLUCOSE SERPL-MCNC: 264 MG/DL (ref 70–99)
HCT VFR BLD AUTO: 39.2 % (ref 40.1–51)
HGB BLD-MCNC: 13.2 G/DL (ref 13.7–17.5)
IMM GRANULOCYTES # BLD AUTO: 0.14 K/UL (ref 0–0.08)
IMM GRANULOCYTES NFR BLD AUTO: 0.7 %
INR PPP: 1.3
LACTATE SERPL-SCNC: 1.8 MMOL/L (ref 0.4–2)
LYMPHOCYTES # BLD: 0.95 K/UL (ref 1.2–3.5)
LYMPHOCYTES NFR BLD: 4.8 %
MCH RBC QN AUTO: 29.9 PG (ref 28–33.2)
MCHC RBC AUTO-ENTMCNC: 33.7 G/DL (ref 32.2–36.5)
MCV RBC AUTO: 88.7 FL (ref 83–98)
MONOCYTES # BLD: 0.96 K/UL (ref 0.3–1)
MONOCYTES NFR BLD: 4.9 %
NEUTROPHILS # BLD: 17.59 K/UL (ref 1.7–7)
NEUTS SEG NFR BLD: 89.3 %
NRBC BLD-RTO: 0 %
PLATELET # BLD AUTO: 463 K/UL (ref 150–350)
PMV BLD AUTO: 10.2 FL (ref 9.4–12.4)
POTASSIUM SERPL-SCNC: 3.7 MEQ/L (ref 3.5–5.1)
PROT SERPL-MCNC: 8.4 G/DL (ref 6–8.2)
PROTHROMBIN TIME: 16.3 SEC (ref 12.2–14.5)
RBC # BLD AUTO: 4.42 M/UL (ref 4.5–5.8)
SODIUM SERPL-SCNC: 133 MEQ/L (ref 136–145)
TROPONIN I SERPL HS-MCNC: 15.6 PG/ML
WBC # BLD AUTO: 19.69 K/UL (ref 3.8–10.5)

## 2025-01-31 PROCEDURE — 85025 COMPLETE CBC W/AUTO DIFF WBC: CPT | Performed by: EMERGENCY MEDICINE

## 2025-01-31 PROCEDURE — 83605 ASSAY OF LACTIC ACID: CPT | Performed by: EMERGENCY MEDICINE

## 2025-01-31 PROCEDURE — 99223 1ST HOSP IP/OBS HIGH 75: CPT | Performed by: HOSPITALIST

## 2025-01-31 PROCEDURE — 85730 THROMBOPLASTIN TIME PARTIAL: CPT | Performed by: NURSE PRACTITIONER

## 2025-01-31 PROCEDURE — 80053 COMPREHEN METABOLIC PANEL: CPT | Performed by: EMERGENCY MEDICINE

## 2025-01-31 PROCEDURE — 99291 CRITICAL CARE FIRST HOUR: CPT

## 2025-01-31 PROCEDURE — 20600000 HC ROOM AND CARE INTERMEDIATE/TELEMETRY

## 2025-01-31 PROCEDURE — 1123F ACP DISCUSS/DSCN MKR DOCD: CPT | Performed by: HOSPITALIST

## 2025-01-31 PROCEDURE — 87040 BLOOD CULTURE FOR BACTERIA: CPT | Performed by: EMERGENCY MEDICINE

## 2025-01-31 PROCEDURE — 63600000 HC DRUGS/DETAIL CODE: Mod: JZ | Performed by: EMERGENCY MEDICINE

## 2025-01-31 PROCEDURE — 85025 COMPLETE CBC W/AUTO DIFF WBC: CPT

## 2025-01-31 PROCEDURE — 80053 COMPREHEN METABOLIC PANEL: CPT

## 2025-01-31 PROCEDURE — 84484 ASSAY OF TROPONIN QUANT: CPT | Performed by: EMERGENCY MEDICINE

## 2025-01-31 PROCEDURE — 86140 C-REACTIVE PROTEIN: CPT | Performed by: NURSE PRACTITIONER

## 2025-01-31 PROCEDURE — 86140 C-REACTIVE PROTEIN: CPT | Performed by: EMERGENCY MEDICINE

## 2025-01-31 PROCEDURE — 96374 THER/PROPH/DIAG INJ IV PUSH: CPT | Mod: 59

## 2025-01-31 PROCEDURE — 85652 RBC SED RATE AUTOMATED: CPT | Performed by: EMERGENCY MEDICINE

## 2025-01-31 PROCEDURE — 36415 COLL VENOUS BLD VENIPUNCTURE: CPT

## 2025-01-31 PROCEDURE — 12000000 HC ROOM AND CARE MED/SURG

## 2025-01-31 PROCEDURE — 85610 PROTHROMBIN TIME: CPT | Performed by: NURSE PRACTITIONER

## 2025-01-31 PROCEDURE — 93005 ELECTROCARDIOGRAM TRACING: CPT | Performed by: NURSE PRACTITIONER

## 2025-01-31 PROCEDURE — 99285 EMERGENCY DEPT VISIT HI MDM: CPT | Mod: 25

## 2025-01-31 PROCEDURE — 25000000 HC PHARMACY GENERAL: Performed by: EMERGENCY MEDICINE

## 2025-01-31 RX ORDER — ACETAMINOPHEN 650 MG/1
650 SUPPOSITORY RECTAL EVERY 4 HOURS PRN
Status: DISCONTINUED | OUTPATIENT
Start: 2025-01-31 | End: 2025-02-01

## 2025-01-31 RX ORDER — INSULIN LISPRO 100 [IU]/ML
6-10 INJECTION, SOLUTION INTRAVENOUS; SUBCUTANEOUS
Status: DISCONTINUED | OUTPATIENT
Start: 2025-02-01 | End: 2025-02-01

## 2025-01-31 RX ORDER — HEPARIN SODIUM 10000 [USP'U]/100ML
100-4000 INJECTION, SOLUTION INTRAVENOUS
Status: DISCONTINUED | OUTPATIENT
Start: 2025-01-31 | End: 2025-02-04

## 2025-01-31 RX ORDER — ACETAMINOPHEN 325 MG/1
650 TABLET ORAL EVERY 4 HOURS PRN
Status: DISCONTINUED | OUTPATIENT
Start: 2025-01-31 | End: 2025-02-01

## 2025-01-31 RX ORDER — ACETAMINOPHEN 650 MG/20.3ML
650 LIQUID ORAL EVERY 4 HOURS PRN
Status: DISCONTINUED | OUTPATIENT
Start: 2025-01-31 | End: 2025-02-01

## 2025-01-31 RX ORDER — DILTIAZEM HCL IN NACL,ISO-OSM 125 MG/125
0-15 PLASTIC BAG, INJECTION (ML) INTRAVENOUS
Status: DISCONTINUED | OUTPATIENT
Start: 2025-01-31 | End: 2025-02-03

## 2025-01-31 RX ORDER — DILTIAZEM HYDROCHLORIDE 5 MG/ML
20 INJECTION INTRAVENOUS ONCE
Status: COMPLETED | OUTPATIENT
Start: 2025-01-31 | End: 2025-01-31

## 2025-01-31 RX ADMIN — HEPARIN SODIUM 1600 UNITS/HR: 10000 INJECTION, SOLUTION INTRAVENOUS at 21:59

## 2025-01-31 RX ADMIN — Medication 5 MG/HR: at 21:59

## 2025-01-31 RX ADMIN — DILTIAZEM HYDROCHLORIDE 20 MG: 5 INJECTION INTRAVENOUS at 21:54

## 2025-01-31 ASSESSMENT — ENCOUNTER SYMPTOMS
FEVER: 0
COLOR CHANGE: 0
NUMBNESS: 0
NECK PAIN: 0
PALPITATIONS: 0
CHEST TIGHTNESS: 0
CHILLS: 0
BACK PAIN: 0
FLANK PAIN: 0
STIFFNESS: 1
NECK STIFFNESS: 0
SHORTNESS OF BREATH: 0

## 2025-02-01 ENCOUNTER — ANESTHESIA EVENT (INPATIENT)
Dept: OPERATING ROOM | Facility: HOSPITAL | Age: 60
DRG: 854 | End: 2025-02-01
Payer: COMMERCIAL

## 2025-02-01 ENCOUNTER — ANESTHESIA (INPATIENT)
Dept: OPERATING ROOM | Facility: HOSPITAL | Age: 60
DRG: 854 | End: 2025-02-01
Payer: COMMERCIAL

## 2025-02-01 PROBLEM — E87.6 HYPOKALEMIA: Status: ACTIVE | Noted: 2025-02-01

## 2025-02-01 PROBLEM — M00.261 STREPTOCOCCAL ARTHRITIS OF RIGHT KNEE (CMS/HCC): Status: ACTIVE | Noted: 2025-01-31

## 2025-02-01 LAB
ABO + RH BLD: NORMAL
ANION GAP SERPL CALC-SCNC: 10 MEQ/L (ref 3–15)
APPEARANCE SNV: ABNORMAL
APTT PPP: 31 SEC (ref 23–35)
ATRIAL RATE: 136
BLD GP AB SCN SERPL QL: NEGATIVE
BODY FLD TYPE: ABNORMAL
BUN SERPL-MCNC: 28 MG/DL (ref 7–25)
CALCIUM SERPL-MCNC: 9.5 MG/DL (ref 8.6–10.3)
CHLORIDE SERPL-SCNC: 95 MEQ/L (ref 98–107)
CO2 SERPL-SCNC: 30 MEQ/L (ref 21–31)
COLOR SNV: YELLOW
CREAT SERPL-MCNC: 1.5 MG/DL (ref 0.7–1.3)
D AG BLD QL: POSITIVE
EGFRCR SERPLBLD CKD-EPI 2021: 53 ML/MIN/1.73M*2
ERYTHROCYTE [DISTWIDTH] IN BLOOD BY AUTOMATED COUNT: 12.8 % (ref 11.6–14.4)
EST. AVERAGE GLUCOSE BLD GHB EST-MCNC: 229 MG/DL
GLUCOSE BLD-MCNC: 244 MG/DL (ref 70–99)
GLUCOSE BLD-MCNC: 266 MG/DL (ref 70–99)
GLUCOSE SERPL-MCNC: 247 MG/DL (ref 70–99)
HBA1C MFR BLD: 9.6 %
HCT VFR BLD AUTO: 35.1 % (ref 40.1–51)
HGB BLD-MCNC: 11.7 G/DL (ref 13.7–17.5)
LABORATORY COMMENT REPORT: NORMAL
LYMPHOCYTES NFR FLD MANUAL: 4 %
MCH RBC QN AUTO: 29.7 PG (ref 28–33.2)
MCHC RBC AUTO-ENTMCNC: 33.3 G/DL (ref 32.2–36.5)
MCV RBC AUTO: 89.1 FL (ref 83–98)
MONOS+MACROS NFR FLD MANUAL: 2 %
NEUTROPHILS NFR FLD MANUAL: 94 %
PLATELET # BLD AUTO: 358 K/UL (ref 150–350)
PMV BLD AUTO: 10.1 FL (ref 9.4–12.4)
POCT TEST: ABNORMAL
POCT TEST: ABNORMAL
POTASSIUM SERPL-SCNC: 3.1 MEQ/L (ref 3.5–5.1)
QRS DURATION: 90
QT INTERVAL: 280
QTC CALCULATION(BAZETT): 439
R AXIS: -47
RBC # BLD AUTO: 3.94 M/UL (ref 4.5–5.8)
RBC # SNV: ABNORMAL CELLS/CU MM (ref 0–10000)
SODIUM SERPL-SCNC: 135 MEQ/L (ref 136–145)
SPECIMEN EXP DATE BLD: NORMAL
SPECIMEN SOURCE: ABNORMAL
T WAVE AXIS: 146
TROPONIN I SERPL HS-MCNC: 12.9 PG/ML
TSH SERPL DL<=0.05 MIU/L-ACNC: 0.77 MIU/L (ref 0.34–5.6)
VENTRICULAR RATE: 148
WBC # BLD AUTO: 14.97 K/UL (ref 3.8–10.5)
WBC # SNV AUTO: 3480 CELLS/CU MM (ref 0–200)

## 2025-02-01 PROCEDURE — 0SBC4ZZ EXCISION OF RIGHT KNEE JOINT, PERCUTANEOUS ENDOSCOPIC APPROACH: ICD-10-PCS | Performed by: STUDENT IN AN ORGANIZED HEALTH CARE EDUCATION/TRAINING PROGRAM

## 2025-02-01 PROCEDURE — 63600000 HC DRUGS/DETAIL CODE: Mod: JZ | Performed by: STUDENT IN AN ORGANIZED HEALTH CARE EDUCATION/TRAINING PROGRAM

## 2025-02-01 PROCEDURE — 71000011 HC PACU PHASE 1 EA ADDL MIN: Performed by: STUDENT IN AN ORGANIZED HEALTH CARE EDUCATION/TRAINING PROGRAM

## 2025-02-01 PROCEDURE — 63600000 HC DRUGS/DETAIL CODE: Performed by: HOSPITALIST

## 2025-02-01 PROCEDURE — 63600000 HC DRUGS/DETAIL CODE: Mod: JZ | Performed by: HOSPITALIST

## 2025-02-01 PROCEDURE — 89051 BODY FLUID CELL COUNT: CPT | Performed by: STUDENT IN AN ORGANIZED HEALTH CARE EDUCATION/TRAINING PROGRAM

## 2025-02-01 PROCEDURE — 84484 ASSAY OF TROPONIN QUANT: CPT | Performed by: EMERGENCY MEDICINE

## 2025-02-01 PROCEDURE — 71000001 HC PACU PHASE 1 INITIAL 30MIN: Performed by: STUDENT IN AN ORGANIZED HEALTH CARE EDUCATION/TRAINING PROGRAM

## 2025-02-01 PROCEDURE — 36000004 HC OR LEVEL 4 INITIAL 30MIN: Performed by: STUDENT IN AN ORGANIZED HEALTH CARE EDUCATION/TRAINING PROGRAM

## 2025-02-01 PROCEDURE — 84443 ASSAY THYROID STIM HORMONE: CPT | Performed by: HOSPITALIST

## 2025-02-01 PROCEDURE — 27200000 HC STERILE SUPPLY: Performed by: STUDENT IN AN ORGANIZED HEALTH CARE EDUCATION/TRAINING PROGRAM

## 2025-02-01 PROCEDURE — 93010 ELECTROCARDIOGRAM REPORT: CPT | Performed by: INTERNAL MEDICINE

## 2025-02-01 PROCEDURE — 86850 RBC ANTIBODY SCREEN: CPT

## 2025-02-01 PROCEDURE — 87075 CULTR BACTERIA EXCEPT BLOOD: CPT | Performed by: STUDENT IN AN ORGANIZED HEALTH CARE EDUCATION/TRAINING PROGRAM

## 2025-02-01 PROCEDURE — 25000000 HC PHARMACY GENERAL: Performed by: HOSPITALIST

## 2025-02-01 PROCEDURE — 63600000 HC DRUGS/DETAIL CODE: Mod: JZ

## 2025-02-01 PROCEDURE — 63600000 HC DRUGS/DETAIL CODE: Mod: JW | Performed by: NURSE ANESTHETIST, CERTIFIED REGISTERED

## 2025-02-01 PROCEDURE — 20600000 HC ROOM AND CARE INTERMEDIATE/TELEMETRY

## 2025-02-01 PROCEDURE — 36415 COLL VENOUS BLD VENIPUNCTURE: CPT | Performed by: NURSE PRACTITIONER

## 2025-02-01 PROCEDURE — 87205 SMEAR GRAM STAIN: CPT | Performed by: STUDENT IN AN ORGANIZED HEALTH CARE EDUCATION/TRAINING PROGRAM

## 2025-02-01 PROCEDURE — 85027 COMPLETE CBC AUTOMATED: CPT | Performed by: HOSPITALIST

## 2025-02-01 PROCEDURE — 36000014 HC OR LEVEL 4 EA ADDL MIN: Performed by: STUDENT IN AN ORGANIZED HEALTH CARE EDUCATION/TRAINING PROGRAM

## 2025-02-01 PROCEDURE — 63600000 HC DRUGS/DETAIL CODE: Mod: JZ | Performed by: NURSE ANESTHETIST, CERTIFIED REGISTERED

## 2025-02-01 PROCEDURE — 37000001 HC ANESTHESIA GENERAL: Performed by: STUDENT IN AN ORGANIZED HEALTH CARE EDUCATION/TRAINING PROGRAM

## 2025-02-01 PROCEDURE — 25000000 HC PHARMACY GENERAL: Performed by: NURSE ANESTHETIST, CERTIFIED REGISTERED

## 2025-02-01 PROCEDURE — 0S9C4ZZ DRAINAGE OF RIGHT KNEE JOINT, PERCUTANEOUS ENDOSCOPIC APPROACH: ICD-10-PCS | Performed by: STUDENT IN AN ORGANIZED HEALTH CARE EDUCATION/TRAINING PROGRAM

## 2025-02-01 PROCEDURE — 63600000 HC DRUGS/DETAIL CODE: Mod: JZ,TB | Performed by: STUDENT IN AN ORGANIZED HEALTH CARE EDUCATION/TRAINING PROGRAM

## 2025-02-01 PROCEDURE — 83036 HEMOGLOBIN GLYCOSYLATED A1C: CPT | Performed by: STUDENT IN AN ORGANIZED HEALTH CARE EDUCATION/TRAINING PROGRAM

## 2025-02-01 PROCEDURE — 63700000 HC SELF-ADMINISTRABLE DRUG: Performed by: HOSPITALIST

## 2025-02-01 PROCEDURE — 3E1U48Z IRRIGATION OF JOINTS USING IRRIGATING SUBSTANCE, PERCUTANEOUS ENDOSCOPIC APPROACH: ICD-10-PCS | Performed by: STUDENT IN AN ORGANIZED HEALTH CARE EDUCATION/TRAINING PROGRAM

## 2025-02-01 PROCEDURE — 80048 BASIC METABOLIC PNL TOTAL CA: CPT | Performed by: HOSPITALIST

## 2025-02-01 PROCEDURE — 25000000 HC PHARMACY GENERAL: Performed by: STUDENT IN AN ORGANIZED HEALTH CARE EDUCATION/TRAINING PROGRAM

## 2025-02-01 PROCEDURE — 85730 THROMBOPLASTIN TIME PARTIAL: CPT | Performed by: HOSPITALIST

## 2025-02-01 PROCEDURE — 25800000 HC PHARMACY IV SOLUTIONS: Performed by: HOSPITALIST

## 2025-02-01 PROCEDURE — 99233 SBSQ HOSP IP/OBS HIGH 50: CPT | Performed by: HOSPITALIST

## 2025-02-01 RX ORDER — SENNOSIDES 8.6 MG/1
2 TABLET ORAL DAILY PRN
Status: DISCONTINUED | OUTPATIENT
Start: 2025-02-01 | End: 2025-02-03

## 2025-02-01 RX ORDER — POTASSIUM CHLORIDE 14.9 MG/ML
20 INJECTION INTRAVENOUS ONCE
Status: COMPLETED | OUTPATIENT
Start: 2025-02-01 | End: 2025-02-01

## 2025-02-01 RX ORDER — PROPOFOL 10 MG/ML
INJECTION, EMULSION INTRAVENOUS AS NEEDED
Status: DISCONTINUED | OUTPATIENT
Start: 2025-02-01 | End: 2025-02-01 | Stop reason: SURG

## 2025-02-01 RX ORDER — OXYCODONE HYDROCHLORIDE 5 MG/1
5 TABLET ORAL EVERY 4 HOURS PRN
Status: DISCONTINUED | OUTPATIENT
Start: 2025-02-01 | End: 2025-02-05 | Stop reason: HOSPADM

## 2025-02-01 RX ORDER — LOSARTAN POTASSIUM 100 MG/1
100 TABLET ORAL DAILY
Status: DISCONTINUED | OUTPATIENT
Start: 2025-02-01 | End: 2025-02-05 | Stop reason: HOSPADM

## 2025-02-01 RX ORDER — SODIUM CHLORIDE, SODIUM LACTATE, POTASSIUM CHLORIDE, CALCIUM CHLORIDE 600; 310; 30; 20 MG/100ML; MG/100ML; MG/100ML; MG/100ML
INJECTION, SOLUTION INTRAVENOUS CONTINUOUS
Status: DISCONTINUED | OUTPATIENT
Start: 2025-02-01 | End: 2025-02-02

## 2025-02-01 RX ORDER — METOCLOPRAMIDE HYDROCHLORIDE 5 MG/ML
10 INJECTION INTRAMUSCULAR; INTRAVENOUS
Status: DISCONTINUED | OUTPATIENT
Start: 2025-02-01 | End: 2025-02-01 | Stop reason: HOSPADM

## 2025-02-01 RX ORDER — ASPIRIN 81 MG/1
81 TABLET ORAL 2 TIMES DAILY
Status: DISCONTINUED | OUTPATIENT
Start: 2025-02-01 | End: 2025-02-02

## 2025-02-01 RX ORDER — POTASSIUM CHLORIDE 20 MEQ/1
40 TABLET, EXTENDED RELEASE ORAL AS NEEDED
Status: DISCONTINUED | OUTPATIENT
Start: 2025-02-01 | End: 2025-02-01

## 2025-02-01 RX ORDER — VANCOMYCIN/0.9 % SOD CHLORIDE 1.5G/250ML
PLASTIC BAG, INJECTION (ML) INTRAVENOUS
Status: COMPLETED
Start: 2025-02-01 | End: 2025-02-01

## 2025-02-01 RX ORDER — FENTANYL CITRATE 50 UG/ML
INJECTION, SOLUTION INTRAMUSCULAR; INTRAVENOUS AS NEEDED
Status: DISCONTINUED | OUTPATIENT
Start: 2025-02-01 | End: 2025-02-01 | Stop reason: SURG

## 2025-02-01 RX ORDER — HYDROMORPHONE HYDROCHLORIDE 1 MG/ML
0.5 INJECTION, SOLUTION INTRAMUSCULAR; INTRAVENOUS; SUBCUTANEOUS
Status: DISCONTINUED | OUTPATIENT
Start: 2025-02-01 | End: 2025-02-01 | Stop reason: HOSPADM

## 2025-02-01 RX ORDER — IBUPROFEN 200 MG
16-32 TABLET ORAL AS NEEDED
Status: DISCONTINUED | OUTPATIENT
Start: 2025-02-01 | End: 2025-02-05 | Stop reason: HOSPADM

## 2025-02-01 RX ORDER — HYDROMORPHONE HYDROCHLORIDE 1 MG/ML
0.5 INJECTION, SOLUTION INTRAMUSCULAR; INTRAVENOUS; SUBCUTANEOUS EVERY 4 HOURS PRN
Status: DISCONTINUED | OUTPATIENT
Start: 2025-02-01 | End: 2025-02-05 | Stop reason: HOSPADM

## 2025-02-01 RX ORDER — ACETAMINOPHEN 325 MG/1
975 TABLET ORAL ONCE
Status: DISCONTINUED | OUTPATIENT
Start: 2025-02-01 | End: 2025-02-01 | Stop reason: HOSPADM

## 2025-02-01 RX ORDER — MEPERIDINE HYDROCHLORIDE 25 MG/ML
12.5 INJECTION INTRAMUSCULAR; INTRAVENOUS; SUBCUTANEOUS EVERY 10 MIN PRN
Status: DISCONTINUED | OUTPATIENT
Start: 2025-02-01 | End: 2025-02-01 | Stop reason: HOSPADM

## 2025-02-01 RX ORDER — DEXTROSE 50 % IN WATER (D50W) INTRAVENOUS SYRINGE
25 AS NEEDED
Status: DISCONTINUED | OUTPATIENT
Start: 2025-02-01 | End: 2025-02-05 | Stop reason: HOSPADM

## 2025-02-01 RX ORDER — HYDROCHLOROTHIAZIDE 25 MG/1
25 TABLET ORAL DAILY
Status: DISCONTINUED | OUTPATIENT
Start: 2025-02-01 | End: 2025-02-05 | Stop reason: HOSPADM

## 2025-02-01 RX ORDER — DEXTROSE 40 %
15-30 GEL (GRAM) ORAL AS NEEDED
Status: DISCONTINUED | OUTPATIENT
Start: 2025-02-01 | End: 2025-02-01 | Stop reason: HOSPADM

## 2025-02-01 RX ORDER — EPHEDRINE SULFATE/0.9% NACL/PF 50 MG/5 ML
SYRINGE (ML) INTRAVENOUS AS NEEDED
Status: DISCONTINUED | OUTPATIENT
Start: 2025-02-01 | End: 2025-02-01 | Stop reason: SURG

## 2025-02-01 RX ORDER — HYDROMORPHONE HYDROCHLORIDE 1 MG/ML
INJECTION, SOLUTION INTRAMUSCULAR; INTRAVENOUS; SUBCUTANEOUS
Status: COMPLETED
Start: 2025-02-01 | End: 2025-02-01

## 2025-02-01 RX ORDER — HYDROMORPHONE HYDROCHLORIDE 1 MG/ML
INJECTION, SOLUTION INTRAMUSCULAR; INTRAVENOUS; SUBCUTANEOUS AS NEEDED
Status: DISCONTINUED | OUTPATIENT
Start: 2025-02-01 | End: 2025-02-01 | Stop reason: SURG

## 2025-02-01 RX ORDER — ONDANSETRON HYDROCHLORIDE 2 MG/ML
4 INJECTION, SOLUTION INTRAVENOUS
Status: DISCONTINUED | OUTPATIENT
Start: 2025-02-01 | End: 2025-02-01 | Stop reason: HOSPADM

## 2025-02-01 RX ORDER — VANCOMYCIN/0.9 % SOD CHLORIDE 1.5G/250ML
1500 PLASTIC BAG, INJECTION (ML) INTRAVENOUS ONCE
Status: COMPLETED | OUTPATIENT
Start: 2025-02-01 | End: 2025-02-01

## 2025-02-01 RX ORDER — FENTANYL CITRATE 50 UG/ML
INJECTION, SOLUTION INTRAMUSCULAR; INTRAVENOUS
Status: COMPLETED
Start: 2025-02-01 | End: 2025-02-01

## 2025-02-01 RX ORDER — SODIUM CHLORIDE, SODIUM LACTATE, POTASSIUM CHLORIDE, CALCIUM CHLORIDE 600; 310; 30; 20 MG/100ML; MG/100ML; MG/100ML; MG/100ML
INJECTION, SOLUTION INTRAVENOUS CONTINUOUS
Status: DISCONTINUED | OUTPATIENT
Start: 2025-02-01 | End: 2025-02-01

## 2025-02-01 RX ORDER — DIPHENHYDRAMINE HCL 50 MG/ML
12.5 VIAL (ML) INJECTION
Status: DISCONTINUED | OUTPATIENT
Start: 2025-02-01 | End: 2025-02-01 | Stop reason: HOSPADM

## 2025-02-01 RX ORDER — INSULIN LISPRO 100 [IU]/ML
4-12 INJECTION, SOLUTION INTRAVENOUS; SUBCUTANEOUS
Status: DISCONTINUED | OUTPATIENT
Start: 2025-02-01 | End: 2025-02-03

## 2025-02-01 RX ORDER — MIDAZOLAM HYDROCHLORIDE 2 MG/2ML
INJECTION, SOLUTION INTRAMUSCULAR; INTRAVENOUS AS NEEDED
Status: DISCONTINUED | OUTPATIENT
Start: 2025-02-01 | End: 2025-02-01 | Stop reason: SURG

## 2025-02-01 RX ORDER — FENTANYL CITRATE 50 UG/ML
50 INJECTION, SOLUTION INTRAMUSCULAR; INTRAVENOUS EVERY 5 MIN PRN
Status: COMPLETED | OUTPATIENT
Start: 2025-02-01 | End: 2025-02-01

## 2025-02-01 RX ORDER — DEXTROSE 40 %
15-30 GEL (GRAM) ORAL AS NEEDED
Status: DISCONTINUED | OUTPATIENT
Start: 2025-02-01 | End: 2025-02-05 | Stop reason: HOSPADM

## 2025-02-01 RX ORDER — IBUPROFEN 200 MG
16-32 TABLET ORAL AS NEEDED
Status: DISCONTINUED | OUTPATIENT
Start: 2025-02-01 | End: 2025-02-01 | Stop reason: HOSPADM

## 2025-02-01 RX ORDER — VANCOMYCIN 1.75 GRAM/500 ML IN 0.9 % SODIUM CHLORIDE INTRAVENOUS
1750 ONCE
Status: COMPLETED | OUTPATIENT
Start: 2025-02-01 | End: 2025-02-01

## 2025-02-01 RX ORDER — DEXTROSE 50 % IN WATER (D50W) INTRAVENOUS SYRINGE
25 AS NEEDED
Status: DISCONTINUED | OUTPATIENT
Start: 2025-02-01 | End: 2025-02-01 | Stop reason: HOSPADM

## 2025-02-01 RX ORDER — HYDROMORPHONE HYDROCHLORIDE 1 MG/ML
INJECTION, SOLUTION INTRAMUSCULAR; INTRAVENOUS; SUBCUTANEOUS
Status: DISPENSED
Start: 2025-02-01 | End: 2025-02-02

## 2025-02-01 RX ORDER — LIDOCAINE HCL/PF 100 MG/5ML
SYRINGE (ML) INTRAVENOUS AS NEEDED
Status: DISCONTINUED | OUTPATIENT
Start: 2025-02-01 | End: 2025-02-01 | Stop reason: SURG

## 2025-02-01 RX ORDER — VANCOMYCIN HYDROCHLORIDE
1250
Status: DISCONTINUED | OUTPATIENT
Start: 2025-02-02 | End: 2025-02-02

## 2025-02-01 RX ORDER — SODIUM CHLORIDE 0.9 G/100ML
INJECTION, SOLUTION IRRIGATION
Status: DISCONTINUED | OUTPATIENT
Start: 2025-02-01 | End: 2025-02-01 | Stop reason: HOSPADM

## 2025-02-01 RX ORDER — POTASSIUM CHLORIDE 20 MEQ/1
20 TABLET, EXTENDED RELEASE ORAL AS NEEDED
Status: DISCONTINUED | OUTPATIENT
Start: 2025-02-01 | End: 2025-02-01

## 2025-02-01 RX ORDER — ACETAMINOPHEN 325 MG/1
650 TABLET ORAL EVERY 4 HOURS PRN
Status: DISCONTINUED | OUTPATIENT
Start: 2025-02-01 | End: 2025-02-05 | Stop reason: HOSPADM

## 2025-02-01 RX ORDER — EPHEDRINE SULFATE/0.9% NACL/PF 50 MG/5 ML
10 SYRINGE (ML) INTRAVENOUS AS NEEDED
Status: DISCONTINUED | OUTPATIENT
Start: 2025-02-01 | End: 2025-02-01 | Stop reason: HOSPADM

## 2025-02-01 RX ORDER — POLYETHYLENE GLYCOL 3350 17 G/17G
17 POWDER, FOR SOLUTION ORAL DAILY PRN
Status: DISCONTINUED | OUTPATIENT
Start: 2025-02-01 | End: 2025-02-03

## 2025-02-01 RX ORDER — HYDRALAZINE HYDROCHLORIDE 20 MG/ML
5 INJECTION INTRAMUSCULAR; INTRAVENOUS
Status: DISCONTINUED | OUTPATIENT
Start: 2025-02-01 | End: 2025-02-01 | Stop reason: HOSPADM

## 2025-02-01 RX ADMIN — SODIUM CHLORIDE, POTASSIUM CHLORIDE, SODIUM LACTATE AND CALCIUM CHLORIDE: 600; 310; 30; 20 INJECTION, SOLUTION INTRAVENOUS at 09:08

## 2025-02-01 RX ADMIN — HYDROMORPHONE HYDROCHLORIDE 0.5 MG: 1 INJECTION, SOLUTION INTRAMUSCULAR; INTRAVENOUS; SUBCUTANEOUS at 14:00

## 2025-02-01 RX ADMIN — CEFTAZIDIME 1 G: 1 INJECTION, POWDER, FOR SOLUTION INTRAMUSCULAR; INTRAVENOUS at 17:03

## 2025-02-01 RX ADMIN — FENTANYL CITRATE 50 MCG: 50 INJECTION INTRAMUSCULAR; INTRAVENOUS at 13:33

## 2025-02-01 RX ADMIN — FENTANYL CITRATE 50 MCG: 50 INJECTION, SOLUTION INTRAMUSCULAR; INTRAVENOUS at 12:04

## 2025-02-01 RX ADMIN — HYDROMORPHONE HYDROCHLORIDE 0.5 MG: 1 INJECTION, SOLUTION INTRAMUSCULAR; INTRAVENOUS; SUBCUTANEOUS at 14:19

## 2025-02-01 RX ADMIN — FENTANYL CITRATE 50 MCG: 50 INJECTION, SOLUTION INTRAMUSCULAR; INTRAVENOUS at 12:22

## 2025-02-01 RX ADMIN — FENTANYL CITRATE 50 MCG: 50 INJECTION, SOLUTION INTRAMUSCULAR; INTRAVENOUS at 13:15

## 2025-02-01 RX ADMIN — FENTANYL CITRATE 50 MCG: 50 INJECTION INTRAMUSCULAR; INTRAVENOUS at 13:43

## 2025-02-01 RX ADMIN — PROPOFOL 200 MG: 10 INJECTION, EMULSION INTRAVENOUS at 11:58

## 2025-02-01 RX ADMIN — ASPIRIN 81 MG: 81 TABLET, COATED ORAL at 19:55

## 2025-02-01 RX ADMIN — OXYCODONE 5 MG: 5 TABLET ORAL at 17:26

## 2025-02-01 RX ADMIN — HEPARIN SODIUM 2000 UNITS/HR: 10000 INJECTION, SOLUTION INTRAVENOUS at 05:52

## 2025-02-01 RX ADMIN — LIDOCAINE HYDROCHLORIDE 80 MG: 20 INJECTION, SOLUTION INTRAVENOUS at 11:58

## 2025-02-01 RX ADMIN — ACETAMINOPHEN 650 MG: 325 TABLET ORAL at 17:25

## 2025-02-01 RX ADMIN — MIDAZOLAM HYDROCHLORIDE 2 MG: 1 INJECTION, SOLUTION INTRAMUSCULAR; INTRAVENOUS at 11:51

## 2025-02-01 RX ADMIN — Medication 5 MG: at 12:30

## 2025-02-01 RX ADMIN — VANCOMYCIN HYDROCHLORIDE 1500 MG: 1 INJECTION, POWDER, LYOPHILIZED, FOR SOLUTION INTRAVENOUS at 11:56

## 2025-02-01 RX ADMIN — ACETAMINOPHEN 650 MG: 325 TABLET ORAL at 02:42

## 2025-02-01 RX ADMIN — FENTANYL CITRATE 100 MCG: 50 INJECTION, SOLUTION INTRAMUSCULAR; INTRAVENOUS at 11:58

## 2025-02-01 RX ADMIN — VANCOMYCIN HYDROCHLORIDE 1750 MG: 500 INJECTION, POWDER, LYOPHILIZED, FOR SOLUTION INTRAVENOUS at 19:55

## 2025-02-01 RX ADMIN — CEFAZOLIN 2 G: 2 INJECTION, POWDER, FOR SOLUTION INTRAMUSCULAR; INTRAVENOUS at 12:02

## 2025-02-01 RX ADMIN — Medication 15 MG/HR: at 03:46

## 2025-02-01 RX ADMIN — FENTANYL CITRATE 50 MCG: 50 INJECTION, SOLUTION INTRAMUSCULAR; INTRAVENOUS at 12:51

## 2025-02-01 RX ADMIN — CEFTAZIDIME 1 G: 1 INJECTION, POWDER, FOR SOLUTION INTRAMUSCULAR; INTRAVENOUS at 23:48

## 2025-02-01 RX ADMIN — HYDROMORPHONE HYDROCHLORIDE 0.5 MG: 1 INJECTION, SOLUTION INTRAMUSCULAR; INTRAVENOUS; SUBCUTANEOUS at 12:39

## 2025-02-01 RX ADMIN — POTASSIUM CHLORIDE 20 MEQ: 14.9 INJECTION, SOLUTION INTRAVENOUS at 09:09

## 2025-02-01 RX ADMIN — INSULIN LISPRO 8 UNITS: 100 INJECTION, SOLUTION INTRAVENOUS; SUBCUTANEOUS at 18:41

## 2025-02-01 ASSESSMENT — ENCOUNTER SYMPTOMS
ENDOCRINE NEGATIVE: 1
EYES NEGATIVE: 1
GASTROINTESTINAL NEGATIVE: 1
NEUROLOGICAL NEGATIVE: 1
RESPIRATORY NEGATIVE: 1
HEADACHES: 1
PSYCHIATRIC NEGATIVE: 1
CARDIOVASCULAR NEGATIVE: 1

## 2025-02-01 ASSESSMENT — COGNITIVE AND FUNCTIONAL STATUS - GENERAL
STANDING UP FROM CHAIR USING ARMS: 2 - A LOT
WALKING IN HOSPITAL ROOM: 1 - TOTAL
CLIMB 3 TO 5 STEPS WITH RAILING: 1 - TOTAL
CLIMB 3 TO 5 STEPS WITH RAILING: 1 - TOTAL
MOVING TO AND FROM BED TO CHAIR: 2 - A LOT
MOVING TO AND FROM BED TO CHAIR: 2 - A LOT
WALKING IN HOSPITAL ROOM: 1 - TOTAL
STANDING UP FROM CHAIR USING ARMS: 2 - A LOT

## 2025-02-01 NOTE — ASSESSMENT & PLAN NOTE
Cultures from outpatient joint aspiration +Strep  Ortho consulted and recommends deferring antibiotics for now

## 2025-02-01 NOTE — CONSULTS
Orthopedic Consult Note    Subjective     Lake Dandy Martin is a 59 y.o. male who present to the ED with an infected right knee. He states that his right knee began to hurt him suddenly about a week ago. He was unable to bear weight or ambulate so he went to [facility] orthopedic urgent care 3 days ago. They aspirated his right knee and today saw the cultures were positive for strep. They called the patient and instructed him to come to the ED. Pt is currently reporting right knee pain, denies any fevers chills, falls or any other injuries. Denies any numbness or tingling.      Medical History:   Past Medical History:   Diagnosis Date    Arthritis     knees    COVID-19 vaccine series completed     pfizer plus booster    Diabetes (CMS/HCC)     Eye injury     Right eye pressure injury    Hypertension     Migraine     hasnt had one in approx 2 yrs -2019    Pulmonary nodules 12/16/2021    Type 2 diabetes mellitus (CMS/[facility])        Surgical History:   Past Surgical History   Procedure Laterality Date    Colectomy      Colonoscopy  2021    COLONOSCOPY, HOT SNARE POLYPECTOMY, TATOOING N/A 12/29/2021    Performed by Tiarra Martinez MD at  GI    cystoscopy placement rukhsana ureteral catheters Bilateral 12/30/2021    Performed by Nat Payne MD at  OR    LOW ANTERIOR RESECTION LAPAROSCOPIC ROBOTIC Robotic Takedown Splenic Flexure, Primary Colorectal anastimosis EEA # 28 Placement of ADITYA drain and Peritoneal Lavage N/A 12/30/2021    Performed by Tiarra Martinez MD at  OR       Social History:   Social History     Social History Narrative    Not on file       Family History:   Family History   Problem Relation Name Age of Onset    Other Biological Mother          pacemaker    Stroke Biological Father      Diabetes Biological Father      Heart failure Biological Father      No Known Problems Biological Sister      Lung cancer Maternal Grandfather          Smoker 50 years     Colon cancer Neg Hx      Rectal cancer Neg Hx          Allergies: Patient has no known allergies.    Current Medications[1]     Medication List        ASK your doctor about these medications      amLODIPine 5 mg tablet  Commonly known as: NORVASC  TAKE 1 TABLET (5 MG TOTAL) BY MOUTH DAILY.  Dose: 5 mg     losartan-hydrochlorothiazide 50-12.5 mg per tablet  Commonly known as: HYZAAR  TAKE 2 TABLETS BY MOUTH EVERY DAY     metFORMIN 1,000 mg tablet  Commonly known as: GLUCOPHAGE  TAKE 1 TABLET BY MOUTH TWICE A DAY WITH MEALS  Dose: 1,000 mg            Review of Systems  Pertinent items are noted in HPI.    Objective   Labs  I have reviewed the patient's labs  Lab Results   Component Value Date    WBC 19.69 (H) 01/31/2025    HGB 13.2 (L) 01/31/2025    HCT 39.2 (L) 01/31/2025    MCV 88.7 01/31/2025     (H) 01/31/2025     Lab Results   Component Value Date    GLUCOSE 264 (H) 01/31/2025    CALCIUM 10.6 (H) 01/31/2025     (L) 01/31/2025    K 3.7 01/31/2025    CO2 27 01/31/2025    CL 94 (L) 01/31/2025    BUN 21 01/31/2025    CREATININE 1.4 (H) 01/31/2025     Lab Results   Component Value Date    .10 (H) 01/31/2025         Imaging  Right knee x-ray is pending    Physical Exam  Pt is awake, alert and orientedx3  In new onset a.fib in the ED  Right knee skin is intact, no redness, bruising or warmth  +TTP right knee with mild swelling/effusion  Limited ROM right knee due to pain otherwise motor exam is intact +DF/PF/EHL  +DP/PT pulses  Compartments are soft and compressible  Calves are soft and nontender  SILT  BCR    Assessment   59 y.o. male being consulted for right knee native joint infection with Dr. Osuna     Plan     Admit to medicine  Plan for OR tomorrow for I&D washout (if cleared)  ID consulted for abx recs- okay to hold on abx for now  Cardiology consulted for a.fib- will need cardiac clearance prior to OR  Will make NPO at [facility]   Medical management pre-op risk stratt per [facility]  Right knee x-ray pending         [1]   Current Inpatient  Medications   Medication Dose Route Frequency Provider Last Rate Last Admin    dilTIAZem HCl in sodium chloride (CARDIZEM) 125 mg/125 mL (1 mg/mL) infusion  0-15 mg/hr intravenous Titrated Edwar Bridges MD 10 mL/hr at 01/31/25 2216 10 mg/hr at 01/31/25 2216    heparin (porcine) bolus from bag 4,250-8,500 Units  40-80 Units/kg (Adjusted) intravenous q6h PRN Edwar Bridges MD        heparin infusion in D5W 100 units/mL  100-4,000 Units/hr intravenous Titrated Edwar Bridges MD 16 mL/hr at 01/31/25 2159 1,600 Units/hr at 01/31/25 2159

## 2025-02-01 NOTE — PROGRESS NOTES
Patient is s/p R knee I&D arthroscopically. Resting in PACU. Pain controlled.    Palpable DP pulse. PT/DF at ankle and fires EHL. Sensation intact in R foot.    Will keep patient on Vanc/Cefepime until ID provides recommendations. Follow OR cultures. WBAT RLE, activity/ROM as tolerated. DVT prophylaxis per medicine team

## 2025-02-01 NOTE — CONSULTS
"   Brief Nutrition Assessment Reason for consult:  MD cx for \"infection\"     Nutrition Interventions/Recommendations:   Diet when able - recommend 1800 DM. Needs tight glycemic control, cx endo if necessary. Goal <140 mg/dl  Weekly weights  Add daily multivitamin   Ensure max daily - 150 kcal, 30 g pro, 6 g CHO  Bowel regimen prn  6. Record % PO Intake at all meals (0%, 25%, 50%, 75%, or 100%)    Monitor:  % PO intake  Diet tolerance  Weight changes  Labs-replete prn  Skin integrity  Bowel function    Goals:         1. To consume and tolerate >/= 75% of estimated needs via goal diet    Subjective: pt in OR having I&D and washout of rt knee. Chart reviewed.     Clinical Course: Patient is a 59 y.o. 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     Hx DM noted with strep + knee infection.   Wt 290#, was 307# on 12/6/24, a 5.5% loss in 2 months is not significant.   , K 3.1.     BMI 35 is obese class II.        Dietary Orders   (From admission, onward)                 Start     Ordered    02/01/25 1018  NPO Diet  Diet effective now         02/01/25 1017                  Weights (last 7 days)       Date/Time Weight    02/01/25 0035 132 kg (290 lb 8 oz)    01/31/25 1734 134 kg (295 lb)          Wt Readings from Last 3 Encounters:   02/01/25 132 kg (290 lb 8 oz)   12/06/24 (!) 140 kg (307 lb 12.8 oz)   09/13/24 (!) 140 kg (308 lb)       Reason for Assessment  Reason For Assessment: physician consult  Diagnosis: infection/sepsis    MST Nutrition Screen Tool  Has patient lost weight without trying?: 0-->No  Has patient been eating poorly due to decreased appetite?: 0-->No  MST Nutrition Screen Score: 0         Physical Findings  Last Bowel Movement: 01/31/25  Skin: surgical incision (knee I&D " "today)    Last Bowel Movement: 01/31/25    Nutrition Order  Nutrition Order: does not meet nutritional requirements  Nutrition Order Comments: NPO    Anthropometrics  Height: 193 cm (6' 4\")      Admit Weight  Admit Weight: 132 kg (290 lb)    Current Weight  Weight Method: Bed scale  Weight: 132 kg (290 lb 8 oz)    Ideal Body Weight (IBW)  Ideal Body Weight (IBW) (kg): 93.24  % Ideal Body Weight: 143.51    Usual Body Weight (UBW)  Usual Body Weight: 139 kg (307 lb) (12/6/24)  Weight Loss: unintentional  Time Frame: Other (comments) (5.5% in 2 months)  Body Mass Index (BMI)  BMI (Calculated): 35.4  BMI Assessment: BMI 35-39.9: obesity grade II  Nutritional Status/Malnutrition: Does not meet criteria for malnutrition                        Labs/Procedures/Meds  Lab Results Reviewed: reviewed, pertinent  Lab Results Comments: K 3.1, Na 135, BUN 28, creat 1.5, ,     Results from last 7 days   Lab Units 02/01/25  0411 01/31/25  1754   SODIUM mEQ/L 135* 133*   POTASSIUM mEQ/L 3.1* 3.7   CHLORIDE mEQ/L 95* 94*   CO2 mEQ/L 30 27   BUN mg/dL 28* 21   CREATININE mg/dL 1.5* 1.4*   GLUCOSE mg/dL 247* 264*   CALCIUM mg/dL 9.5 10.6*      Results from last 7 days   Lab Units 01/31/25  1754   ALK PHOS IU/L 71   BILIRUBIN TOTAL mg/dL 1.6*   ALBUMIN g/dL 3.2*  3.2*   ALT IU/L 10   AST IU/L 16          Lab Results   Component Value Date    HGBA1C 10.0 (H) 08/02/2023    HGBA1C 6.7 (H) 03/05/2021    HGBA1C 9.4 (H) 12/31/2019     No results found for: \"GIRGRKQP82\"  Lab Results   Component Value Date    CALCIUM 9.5 02/01/2025     Results from last 7 days   Lab Units 02/01/25  0411 01/31/25  1754   WBC K/uL 14.97* 19.69*   HEMOGLOBIN g/dL 11.7* 13.2*   HEMATOCRIT % 35.1* 39.2*   PLATELETS K/uL 358* 463*                           Medications  Pertinent Medications Reviewed: reviewed, pertinent  Pertinent Medications Comments: humalog, lac ringers   acetaminophen  975 mg oral Once    ceFAZolin  2 g intravenous Once    [Provider " Managed Hold] losartan  100 mg oral Daily    And    [Provider Managed Hold] hydrochlorothiazide  25 mg oral Daily    [Transfer Hold] insulin lispro U-100  4-12 Units subcutaneous TID with meals    povidone-iodine  1 Application Each Nostril 60 min Pre-Op    vancomycin  1,500 mg intravenous Once      dilTIAZem  0-15 mg/hr 10 mg/hr (02/01/25 0959)    [Provider Managed Hold] heparin infusion - MAR calculator by PTT  100-4,000 Units/hr Stopped (02/01/25 0601)    lactated ringer's        lactated ringer's   80 mL/hr at 02/01/25 0908       Nutritional Needs Met?: No (In OR, NPO)  Nutrition Status Classification: Mild nutritional compromise (infection, wt loss)        Date: 02/01/25  Signature: Anamaria Seaman, RD, LDN

## 2025-02-01 NOTE — PROGRESS NOTES
Department of Hospital Medicine -  Daily Progress Note       SUBJECTIVE   Patient seen and examined earlier this morning.   Resting in bed  For the OR today for septic joint washout  Afebrile  No CP or SOB  Currently in NSR  Discussed with cardiology and orthopedic surgery     OBJECTIVE      Vital signs in last 24 hours:  Temp:  [36.2 °C (97.2 °F)-37.7 °C (99.9 °F)] 36.6 °C (97.9 °F)  Heart Rate:  [] 67  Resp:  [8-20] 11  BP: (106-132)/(60-87) 125/67    PHYSICAL EXAMINATION        General:  no acute distress  HEENT:  PERRL, anicteric sclera   Neck: supple, no JVD  Cardiac: RRR, +S1/S2   Lungs: clear  Abdomen: soft, NT/ND, +BS, no rebound/guarding   Extremities: no edema, distal perfusion intact  Neuro: Awake, alert, oriented, nonfocal.   Skin: no rash   Psych: cooperative     LABS / IMAGING / TELE      Labs (personally reviewed):  Results from last 7 days   Lab Units 02/01/25  0411   SODIUM mEQ/L 135*   POTASSIUM mEQ/L 3.1*   CHLORIDE mEQ/L 95*   CO2 mEQ/L 30   BUN mg/dL 28*   CREATININE mg/dL 1.5*   GLUCOSE mg/dL 247*   CALCIUM mg/dL 9.5          Results from last 7 days   Lab Units 02/01/25  0411   WBC K/uL 14.97*   HEMOGLOBIN g/dL 11.7*   HEMATOCRIT % 35.1*   PLATELETS K/uL 358*       Results from last 7 days   Lab Units 01/31/25  2112   INR  1.3          Microbiology Data (personally reviewed):  Microbiology Results       ** No results found for the last 720 hours. **            Imaging  I have independently reviewed the pertinent imaging from the last 24 hrs.    ECG/Telemetry  Telemetry reviewed    ASSESSMENT AND PLAN           Assessment & Plan  Streptococcal arthritis of right knee (CMS/HCC)  Cultures from outpatient joint aspiration +Strep  Ortho consulted and recommended deferring antibiotics pending washout.  Patient went to OR 2/1 for R knee washout and cultures sent.  IV antibiotics ortho recommends Vanc/cefepime pending ID recommendations/cultures  DVT prophylaxis post op per ortho  Bowel  regimen, IS, PT   Diabetes mellitus (CMS/HCC)  ADA diet, POCT glucose qACHS  SSI coverage (PRN), hold Metformin  Primary hypertension  Hold losartan/hctz  On diltiazem gtt  New onset atrial fibrillation (CMS/HCC)  Patient found to be in rapid Afib (HR to 140s) during ER course  No history of Afib, patient asymptomatic  Cardizem gtt + heparin gtt initiated by ER provider, will continue  TTE pending  TFT's  Monitor on telemetry, cardiology consulted  Cardiology recommends continued diltiazem perioperatively  Patient in and out of afib  Heparin gtt can be resumed 2/2 per discussion with ortho    Hypokalemia  Monitor and replete prn      VTE Prophylaxis Plan: Current anticoagulants:  [Provider Managed Hold] heparin (porcine) bolus from bag 4,250-8,500 Units, intravenous, q6h PRN  [Provider Managed Hold] heparin infusion in D5W 100 units/mL, intravenous, Titrated        Code Status: Full Code    Estimated Discharge Date: 2/4/2025       Scott Kuo DO  2/1/2025  2:57 PM

## 2025-02-01 NOTE — ASSESSMENT & PLAN NOTE
With atrial fibrillation and atrial flutter. Episodes last 5-10 minutes and then in sinus. Continue Cardizem gtt. Acceptable risk for OR if needed. CHADS2-VASC of 2 and recommend DOAC when no surgical procedures are needed. Can do echocardiogram inpatient or outpatient. Replete KYsabel

## 2025-02-01 NOTE — H&P (VIEW-ONLY)
Orthopedic Consult Note    Subjective     Lake Dandy Martin is a 59 y.o. male who present to the ED with an infected right knee. He states that his right knee began to hurt him suddenly about a week ago. He was unable to bear weight or ambulate so he went to [facility] orthopedic urgent care 3 days ago. They aspirated his right knee and today saw the cultures were positive for strep. They called the patient and instructed him to come to the ED. Pt is currently reporting right knee pain, denies any fevers chills, falls or any other injuries. Denies any numbness or tingling.      Medical History:   Past Medical History:   Diagnosis Date    Arthritis     knees    COVID-19 vaccine series completed     pfizer plus booster    Diabetes (CMS/HCC)     Eye injury     Right eye pressure injury    Hypertension     Migraine     hasnt had one in approx 2 yrs -2019    Pulmonary nodules 12/16/2021    Type 2 diabetes mellitus (CMS/[facility])        Surgical History:   Past Surgical History   Procedure Laterality Date    Colectomy      Colonoscopy  2021    COLONOSCOPY, HOT SNARE POLYPECTOMY, TATOOING N/A 12/29/2021    Performed by Tiarra Martinez MD at  GI    cystoscopy placement rukhsana ureteral catheters Bilateral 12/30/2021    Performed by Nat Payne MD at  OR    LOW ANTERIOR RESECTION LAPAROSCOPIC ROBOTIC Robotic Takedown Splenic Flexure, Primary Colorectal anastimosis EEA # 28 Placement of ADITYA drain and Peritoneal Lavage N/A 12/30/2021    Performed by Tiarra Martinez MD at  OR       Social History:   Social History     Social History Narrative    Not on file       Family History:   Family History   Problem Relation Name Age of Onset    Other Biological Mother          pacemaker    Stroke Biological Father      Diabetes Biological Father      Heart failure Biological Father      No Known Problems Biological Sister      Lung cancer Maternal Grandfather          Smoker 50 years     Colon cancer Neg Hx      Rectal cancer Neg Hx          Allergies: Patient has no known allergies.    Current Medications[1]     Medication List        ASK your doctor about these medications      amLODIPine 5 mg tablet  Commonly known as: NORVASC  TAKE 1 TABLET (5 MG TOTAL) BY MOUTH DAILY.  Dose: 5 mg     losartan-hydrochlorothiazide 50-12.5 mg per tablet  Commonly known as: HYZAAR  TAKE 2 TABLETS BY MOUTH EVERY DAY     metFORMIN 1,000 mg tablet  Commonly known as: GLUCOPHAGE  TAKE 1 TABLET BY MOUTH TWICE A DAY WITH MEALS  Dose: 1,000 mg            Review of Systems  Pertinent items are noted in HPI.    Objective   Labs  I have reviewed the patient's labs  Lab Results   Component Value Date    WBC 19.69 (H) 01/31/2025    HGB 13.2 (L) 01/31/2025    HCT 39.2 (L) 01/31/2025    MCV 88.7 01/31/2025     (H) 01/31/2025     Lab Results   Component Value Date    GLUCOSE 264 (H) 01/31/2025    CALCIUM 10.6 (H) 01/31/2025     (L) 01/31/2025    K 3.7 01/31/2025    CO2 27 01/31/2025    CL 94 (L) 01/31/2025    BUN 21 01/31/2025    CREATININE 1.4 (H) 01/31/2025     Lab Results   Component Value Date    .10 (H) 01/31/2025         Imaging  Right knee x-ray is pending    Physical Exam  Pt is awake, alert and orientedx3  In new onset a.fib in the ED  Right knee skin is intact, no redness, bruising or warmth  +TTP right knee with mild swelling/effusion  Limited ROM right knee due to pain otherwise motor exam is intact +DF/PF/EHL  +DP/PT pulses  Compartments are soft and compressible  Calves are soft and nontender  SILT  BCR    Assessment   59 y.o. male being consulted for right knee native joint infection with Dr. Osuna     Plan     Admit to medicine  Plan for OR tomorrow for I&D washout (if cleared)  ID consulted for abx recs- okay to hold on abx for now  Cardiology consulted for a.fib- will need cardiac clearance prior to OR  Will make NPO at [facility]   Medical management pre-op risk stratt per [facility]  Right knee x-ray pending         [1]   Current Inpatient  Medications   Medication Dose Route Frequency Provider Last Rate Last Admin    dilTIAZem HCl in sodium chloride (CARDIZEM) 125 mg/125 mL (1 mg/mL) infusion  0-15 mg/hr intravenous Titrated Edwar Bridges MD 10 mL/hr at 01/31/25 2216 10 mg/hr at 01/31/25 2216    heparin (porcine) bolus from bag 4,250-8,500 Units  40-80 Units/kg (Adjusted) intravenous q6h PRN Edwar Bridges MD        heparin infusion in D5W 100 units/mL  100-4,000 Units/hr intravenous Titrated Edwar Bridges MD 16 mL/hr at 01/31/25 2159 1,600 Units/hr at 01/31/25 2159

## 2025-02-01 NOTE — PROGRESS NOTES
"Vancomycin Dosing by Pharmacy Consult Initiated    Lake Dandy Martin is a 59 y.o. male who has been consulted for vancomycin dosing for bone/joint infection prescribed by Dr Kuo .    Reviewed relevant clinical data including weight, renal function, previous vancomycin doses, and vancomycin levels:  Creatinine   Date/Time Value Ref Range Status   02/01/2025 0411 1.5 (H) 0.7 - 1.3 mg/dL Final   01/31/2025 1754 1.4 (H) 0.7 - 1.3 mg/dL Final     No results found for: \"VANCORANDOM\", \"VANCOTROUGH\", \"VANCOPEAK\"      Vancomycin Administrations (last 96 hours)       Date/Time Action Medication Dose    02/01/25 1156 Given    vancomycin 1.5 g/500 mL IVPB in NSS 1,500 mg            Assessment/Plan  The patient is ordered vancomycin dosing by pharmacy.    The dose will be based on:         Wt Readings from Last 1 Encounters:   02/01/25 132 kg (290 lb 8 oz)         Estimated Creatinine Clearance: 77.8 mL/min by (C-G formula)    A loading dose of 1500mg was given in the ED.   Will add on 500mg to the first maintenance dose to complete the load.  Will initiate a  maintenance dose of 1250 mg IV every 8 hours.    Target a trough of 15-20 ug/mL per vancomycin dosing per pharmacy order.  Pharmacy will continue to follow the patient's vancomycin dosing daily.      Please call vancomycin levels to the pharmacy.  Terrell Patel [facility]    "

## 2025-02-01 NOTE — PLAN OF CARE
Plan of Care Review  Plan of Care Reviewed With: patient  Progress: improving  Outcome Evaluation: Pt received from ED, aaox4, pleasant/cooperative.  RIght knee swollen, unable to bear weight.  Cardizem gtt @15, heparin gtt running.  Able to use urinal.  NPO at midnight, TTE today and possible I&D.  VSS, NAD, bed alarm active and call bell within reach.

## 2025-02-01 NOTE — PROGRESS NOTES
Orthopaedics Progress Note    [S]  NPO since midnight for R knee I&D. + OSH cultures growing GBS. Heparin drip held.    [O]   Vitals:    02/01/25 0827   BP: 106/60   Pulse: 74   Resp: 18   Temp: 36.6 °C (97.9 °F)   SpO2: 94%       Physical Exam:  Swelling of R knee  +DP palpable  +EHL/PF/DF  Sensation in R foot intact      [A/P]   59 y.o. male presenting with native R knee septic arthritis. He is also in Afib and has been on heparin drip, which is currently being held in preparation for OR.    - NPO for OR, I&D of R knee  - Appreciate cardiology and medical clearance    Diaz Leon Jr, MD

## 2025-02-01 NOTE — ED PROVIDER NOTES
Emergency Medicine Note  HPI   HISTORY OF PRESENT ILLNESS     Patient had arthrocentesis at [facility] urgent care 2 days ago.  Was called today because culture grew staph      History provided by:  Patient  Knee Pain  Location:  Knee  Time since incident:  1 week  Injury: no    Knee location:  R knee  Pain details:     Quality:  Throbbing    Radiates to:  Does not radiate    Severity:  Moderate    Onset quality:  Gradual    Timing:  Constant    Progression:  Worsening  Prior injury to area:  No  Relieved by:  None tried  Worsened by:  Bearing weight and flexion  Associated symptoms: decreased ROM, stiffness and swelling    Associated symptoms: no back pain, no fever and no neck pain          Patient History   PAST HISTORY     Reviewed from Nursing Triage:       Past Medical History:   Diagnosis Date    Arthritis     knees    COVID-19 vaccine series completed     pfizer plus booster    Diabetes (CMS/HCC)     Eye injury     Right eye pressure injury    Hypertension     Migraine     hasnt had one in approx 2 yrs -2019    Pulmonary nodules 12/16/2021    Type 2 diabetes mellitus (CMS/HCC)        Past Surgical History   Procedure Laterality Date    Colectomy      Colonoscopy  2021    COLONOSCOPY, HOT SNARE POLYPECTOMY, TATOOING N/A 12/29/2021    Performed by Tiarra Martinez MD at  GI    cystoscopy placement rukhsana ureteral catheters Bilateral 12/30/2021    Performed by Nat Payne MD at  OR    LOW ANTERIOR RESECTION LAPAROSCOPIC ROBOTIC Robotic Takedown Splenic Flexure, Primary Colorectal anastimosis EEA # 28 Placement of ADITYA drain and Peritoneal Lavage N/A 12/30/2021    Performed by Tairra Martinez MD at  OR       Family History   Problem Relation Name Age of Onset    Other Biological Mother          pacemaker    Stroke Biological Father      Diabetes Biological Father      Heart failure Biological Father      No Known Problems Biological Sister      Lung cancer Maternal Grandfather          Smoker 50 years      Colon cancer Neg Hx      Rectal cancer Neg Hx         Social History     Tobacco Use    Smoking status: Never    Smokeless tobacco: Never   Vaping Use    Vaping status: Never Used   Substance Use Topics    Alcohol use: Not Currently    Drug use: Never         Review of Systems   REVIEW OF SYSTEMS     Review of Systems   Constitutional:  Negative for chills and fever.   Respiratory:  Negative for chest tightness and shortness of breath.    Cardiovascular:  Negative for chest pain and palpitations.   Genitourinary:  Negative for flank pain.   Musculoskeletal:  Positive for stiffness. Negative for back pain, neck pain and neck stiffness.   Skin:  Negative for color change, pallor and rash.   Neurological:  Negative for numbness.         VITALS     ED Vitals      Date/Time Temp Pulse Resp BP SpO2 [facility]   01/31/25 2216 -- 110 -- 123/87 -- BG   01/31/25 2203 -- 124 16 123/87 95 % BG   01/31/25 2154 -- 137 -- 109/82 -- CLK   01/31/25 2044 37.3 °C (99.1 °F) 87 16 121/69 97 % CLK   01/31/25 1734 37.7 °C (99.9 °F) 78 18 126/62 97 % BG          Pulse Ox %: 97 % (01/31/25 2036)  Pulse Ox Interpretation: Normal (01/31/25 2036)  Heart Rate: 78 (01/31/25 2036)  Rhythm Strip Interpretation: Normal Sinus Rhythm (01/31/25 2036)     Physical Exam   PHYSICAL EXAM     Physical Exam  Vitals and nursing note reviewed.   Constitutional:       Appearance: Normal appearance.   HENT:      Head: Normocephalic.   Cardiovascular:      Rate and Rhythm: Normal rate and regular rhythm.      Pulses: Normal pulses.      Heart sounds: No murmur heard.  Pulmonary:      Effort: Pulmonary effort is normal.      Breath sounds: Normal breath sounds.   Abdominal:      Palpations: Abdomen is soft.      Tenderness: There is no abdominal tenderness.   Musculoskeletal:         General: Swelling (Large right knee effusion.  Increased warmth and tenderness) and tenderness present. No deformity or signs of injury.      Cervical back: Neck supple. No rigidity.       Right lower leg: No edema.      Left lower leg: No edema.   Skin:     Capillary Refill: Capillary refill takes less than 2 seconds.   Neurological:      General: No focal deficit present.      Mental Status: He is alert.           PROCEDURES     Critical Care    Performed by: Edwar Bridges MD  Authorized by: Edwar Bridges MD    Critical care provider statement:     Critical care time (minutes):  35    Critical care time was exclusive of:  Separately billable procedures and treating other patients    Critical care was necessary to treat or prevent imminent or life-threatening deterioration of the following conditions:  Circulatory failure and cardiac failure    Critical care was time spent personally by me on the following activities:  Ordering and performing treatments and interventions, ordering and review of laboratory studies, ordering and review of radiographic studies, pulse oximetry, re-evaluation of patient's condition, examination of patient, evaluation of patient's response to treatment, development of treatment plan with patient or surrogate and discussions with consultants       DATA     Results       Procedure Component Value Units Date/Time    [place] [facility] [place] [[id number]]  (Abnormal) Collected: 01/31/25 2101    Specimen: Blood, Venous Updated: 01/31/25 2246     Sed Rate 118 mm/hr     HS Troponin (with 2 hour reflex) [[id number]]  (Abnormal) Collected: 01/31/25 2152    Specimen: Blood, Venous Updated: 01/31/25 2237     High Sens Troponin I 15.6 pg/mL     Lactate, w/ reflex repeat if > 2.0 [[id number]]  (Normal) Collected: 01/31/25 2101    Specimen: Blood, Venous Updated: 01/31/25 2112     Lactate 1.8 mmol/L     Blood Culture Blood, Venous [[id number]] Collected: 01/31/25 2101    Specimen: Blood, Venous Updated: 01/31/25 2105    Blood Culture Blood, Venous [[id number]] Collected: 01/31/25 2101    Specimen: Blood, Venous Updated: 01/31/25 2105    C-reactive protein [[id number]]  (Abnormal)  Collected: 01/31/25 1754    Specimen: Blood, Venous Updated: 01/31/25 2058     .00 mg/L     Comprehensive metabolic panel [[id number]]  (Abnormal) Collected: 01/31/25 1754    Specimen: Blood, Venous Updated: 01/31/25 1845     Sodium 133 mEQ/L      Potassium 3.7 mEQ/L      Comment: Results obtained on plasma. Plasma Potassium values may be up to 0.4 mEQ/L less than serum values. The differences may be greater for patients with high platelet or white cell counts.        Chloride 94 mEQ/L      CO2 27 mEQ/L      BUN 21 mg/dL      Creatinine 1.4 mg/dL      Glucose 264 mg/dL      Calcium 10.6 mg/dL      AST (SGOT) 16 IU/L      ALT (SGPT) 10 IU/L      Alkaline Phosphatase 71 IU/L      Total Protein 8.4 g/dL      Comment: Test performed on plasma which typically contains approximately 0.4 g/dL more protein than serum.        Albumin 3.2 g/dL      Bilirubin, Total 1.6 mg/dL      eGFR 57.5 mL/min/1.73m*2      Comment: Calculation based on the Chronic Kidney Disease Epidemiology Collaboration (CKD-EPI) equation refit without adjustment for race.        Anion Gap 12 mEQ/L     CBC and differential [[id number]]  (Abnormal) Collected: 01/31/25 1754    Specimen: Blood, Venous Updated: 01/31/25 1823     WBC 19.69 K/uL      RBC 4.42 M/uL      Hemoglobin 13.2 g/dL      Hematocrit 39.2 %      MCV 88.7 fL      MCH 29.9 pg      MCHC 33.7 g/dL      RDW 12.8 %      Platelets 463 K/uL      MPV 10.2 fL      Differential Type Auto     nRBC 0.0 %      Immature Granulocytes 0.7 %      Neutrophils 89.3 %      Lymphocytes 4.8 %      Monocytes 4.9 %      Eosinophils 0.1 %      Basophils 0.2 %      Immature Granulocytes, Absolute 0.14 K/uL      Neutrophils, Absolute 17.59 K/uL      Lymphocytes, Absolute 0.95 K/uL      Monocytes, Absolute 0.96 K/uL      Eosinophils, Absolute 0.01 K/uL      Basophils, Absolute 0.04 K/uL             Imaging Results    None         ECG 12 lead   Independent Interpretation by ED Provider   A-fib 148  Nonspecific  ST-T wave abnormality          Scoring tools                                  ED Course & MDM   MDM / ED COURSE / CLINICAL IMPRESSION / DISPO     Medical Decision Making  Problems Addressed:  Atrial fibrillation with rapid ventricular response (CMS/HCC): acute illness or injury  New onset atrial fibrillation (CMS/HCC): acute illness or injury  Staphylococcal arthritis of right knee (CMS/HCC): acute illness or injury    Amount and/or Complexity of Data Reviewed  Independent Historian: inocente  Labs: ordered. Decision-making details documented in ED Course.  Radiology: ordered and independent interpretation performed. Decision-making details documented in ED Course.  ECG/medicine tests: independent interpretation performed. Decision-making details documented in ED Course.    Risk  Prescription drug management.  Decision regarding hospitalization.        ED Course as of 01/31/25 2258 Fri Jan 31, 2025 2039 WBC(!): 19.69  All labs reviewed.  Leukocytosis consistent with infection.  Blood cultures, lactate CRP, and ESR ordered [JK]   2051 Discussed with orthopedics who will come to evaluate.  They are consulting with infectious disease on decision for antibiotics.  Okay to hold on antibiotics until that time per Ortho.  They recommend admitting to medicine for medical management and they will take to the OR tomorrow for washout [JK]   2140 Patient with new onset A-fib with RVR.  Asymptomatic.  No chest pain shortness of breath or palpitations.  To start IV Cardizem and heparin and consult cardiology [JK]   2221 Heart rate improving on Cardizem [JK]      ED Course User Index  [JK] Edwar Bridges MD     Clinical Impression      Staphylococcal arthritis of right knee (CMS/HCC)   New onset atrial fibrillation (CMS/HCC)   Atrial fibrillation with rapid ventricular response (CMS/HCC)     _________________       ED Disposition   Admit / Observation                       Edwar Bridges MD  01/31/25 4629

## 2025-02-01 NOTE — PROGRESS NOTES
CHIEF COMPLAINTS:    Right knee pain.     HPI:  The patient presents with 1 week of pain of the right knee which is severe in intensity and the restriction of function that is intolerable.  It began on Thursday, January 23.  He denies any traumatic event or denies any recent trauma or surgeries.  He has not had any recent dental work.  Of note he had a colonoscopy on Monday, January 27 in which they found a small polyp and it was removed.  However his right knee pain started before the colonoscopy he states out of nowhere his right knee became swollen and painful and he just tried to manage it with over-the-counter medications.  He then presented to the [facility] urgent care on Tuesday, January 28 in which his knee was aspirated.  I received a call last night as I am currently on-call from another physician stating that his aspiration results came back at 64,000 nucleated cells and cultures were growing group B strep.  I told physician to immediately send the patient to the emergency department as he would need a irrigation and debridement of his native septic knee.  The patient denies any IV drug use and smoking.        Conservative Management: The patient has tried non-steroidal anti-inflammatory medications, ice and/or heat, activity modification, and he has been on antibiotics since his admission last night of Vanco and cefepime.  Duration of Conservative management: 2 weeks  The patient is unable to tolerate further conservative measures, including physical therapy, due to the severity of pain and current infection.       PMH includes a history of colon cancer in which she had a partial colon resection approximately 3 years ago.  His last colonoscopy was Monday.  He also has type 2 diabetes his last A1c was about a year ago and he states it was 6.7.  He also has a history of atrial fibrillation.  Denies any prior MI or stroke. No history of DVT / PE or previous joint infection. Denies kidney, liver,  lung disease. Review of systems is negative for other musculoskeletal complaints, rapidly progressive neurological disorder, chest pain, shortness of breath, fevers, chills, or any signs of active or persistent local or systemic infection.     Risk Factor Assessment:  Type 2 diabetes, history of colon cancer     REVIEW OF SYSTEMS:  Review of Systems:  GENERAL: negative    SKIN: negative except as above and as per PMH.    NEUROLOGIC: negative except as above and as per PMH.  ENT:  negative except as above and as per PMH.  RESPIRATORY: negative except as above and as per PMH.  CARDIOVASCULAR:  negative except as above and as per PMH.  GI: negative except as above and as per PMH.  : negative except as above and as per PMH.  MUSCULO-SKELETAL: As Above  HEMATOLOGIC: negative except as above and as per PMH.  PSYCHIATRIC: negative except as above and as per PMH.     All other systems reviewed and are negative except as above.     EXAMINATION:            General: Awake, Alert and oriented x3, well developed, well nourished. No acute distress Psychiatric: Cooperative, appropriate mood and affect. Cardiac: Regular rate. Pulmonary: Non-labored respiration. Abdominal: Non-distended.  Musculoskeletal:     Inspection: Knee appears unremarkable.  Skin changes: no skin abnormalities noted  Significant effusion present     Palpation: Diffusely tender to palpation. No calf tenderness.     Gait evaluation not tested     Right knee motion is reduced and does cause significant pain.  Crepitus is present. The right knee range of motion is as follows: 10-30 limited by severe pain, any passive range of motion causes significant pain.  The right knee is stable within that range-of-motion.  The left knee is stable within that range-of-motion.  Left knee range of motion is 5-100 no pain with knee range of motion both passive or active.  Knee muscle strength is normal bilaterally with the skin intact.  5/5 strength in hip flexion, knee  extension, knee flexion, dorsiflexion, plantarflexion, EHL, FHL, and Peroneals. 2/2 SILT L2-S1. Pedal pulses are palpable.  Hip examination, including flexion and internal rotation, was negative in that groin pain was not produced. The patient had a negative straight leg raise bilaterally.     RADIOGRAPHIC EXAMINATION:  I have conducted independent visualization and review of all images and reports. Standing AP knee, standing flexed knee, lateral knee, and sunrise views of the right knee were ordered and taken in the office and demonstrate degenerative joint disease of the knee with definite joint space narrowing, osteophyte formation, and subchondral sclerosis.  No fractures are noted.     CBC with Diff  WBC   Date Value Ref Range Status   02/01/2025 14.97 (H) 3.80 - 10.50 K/uL Final     RBC   Date Value Ref Range Status   02/01/2025 3.94 (L) 4.50 - 5.80 M/uL Final     HGB   Date Value Ref Range Status   02/01/2025 11.7 (L) 13.7 - 17.5 g/dL Final     HCT   Date Value Ref Range Status   02/01/2025 35.1 (L) 40.1 - 51.0 % Final     MCV   Date Value Ref Range Status   02/01/2025 89.1 83.0 - 98.0 fL Final     MCH   Date Value Ref Range Status   02/01/2025 29.7 28.0 - 33.2 pg Final     MCHC   Date Value Ref Range Status   02/01/2025 33.3 32.2 - 36.5 g/dL Final     RDW   Date Value Ref Range Status   02/01/2025 12.8 11.6 - 14.4 % Final     PLT   Date Value Ref Range Status   02/01/2025 358 (H) 150 - 350 K/uL Final     MPV   Date Value Ref Range Status   02/01/2025 10.1 9.4 - 12.4 fL Final     NEUTROABS   Date Value Ref Range Status   01/31/2025 17.59 (H) 1.70 - 7.00 K/uL Final     MONOABS   Date Value Ref Range Status   01/31/2025 0.96 0.30 - 1.00 K/uL Final     EOSABS   Date Value Ref Range Status   01/31/2025 0.01 (L) 0.04 - 0.54 K/uL Final     BASOABS   Date Value Ref Range Status   01/31/2025 0.04 0.01 - 0.10 K/uL Final     NEUTROPHILS   Date Value Ref Range Status   01/31/2025 89.3 % Final     LYMPHOCYTES   Date  Value Ref Range Status   01/31/2025 4.8 % Final     MONOCYTES   Date Value Ref Range Status   01/31/2025 4.9 % Final     EOSINOPHILS   Date Value Ref Range Status   01/31/2025 0.1 % Final     BASOPHILS   Date Value Ref Range Status   01/31/2025 0.2 % Final        BMP Results         02/01/25 01/31/25 08/02/23     0411 1754 0707     133 138    K 3.1 3.7 4.2    Cl 95 94 95    CO2 30 27 28    Glucose 247 264 258    BUN 28 21 15    Creatinine 1.5 1.4 0.91    Calcium 9.5 10.6 --    Anion Gap 10 12 --    EGFR 53.0 57.5 98           Comment for K at 1754 on 01/31/25: Results obtained on plasma. Plasma Potassium values may be up to 0.4 mEQ/L less than serum values. The differences may be greater for patients with high platelet or white cell counts.    Comment for EGFR at 0411 on 02/01/25: Calculation based on the Chronic Kidney Disease Epidemiology Collaboration (CKD-EPI) equation refit without adjustment for race.    Comment for EGFR at 1754 on 01/31/25: Calculation based on the Chronic Kidney Disease Epidemiology Collaboration (CKD-EPI) equation refit without adjustment for race.             Aspiration from January 28 64,000 white cells, cultures positive for group B strep.  CRP is 242.  ESR is 118.  Albumin is 3.2.    ASSESSMENT NOTES:    Right septic knee growing group B strep.  I reviewed the diagnosis and treatment options.       TREATMENT NOTES:  I had a long discussion with the patient regarding etiology and treatment options.  An extensive discussion was conducted of the natural history of the disease, which typically can include continued infection and possible spreading of infection and sepsis.  It can also cause joint destruction and further development of arthritis.  However it can also sometimes be treated with antibiotics and serial aspirations, however this is usually not curative but it is an option for nonoperative management. Conservative treatment measures may not work for all patients. The benefit  of non-operative management is generally fewer complications compared to surgery. There’s no risk of  anesthesia complications, or post-operative rehabilitation issues. Risks of non-operative management include the possibilities of continued infection, spreading of infection, joint destruction. The patient has stated the non-operative management has not sufficiently relived their pain and they would like the best chance of clearing this infection.  I have also counseled the patient that since he has likely had a septic arthritis for over a week at this point, there is a good chance of rapid progression of his osteoarthritis due to the cartilage damage by the bacteria.  The patient understands this.     I also discussed surgical options with the patient. At this point, based on the patient’s history of septic arthritis growing group B strep he is a candidate for an irrigation and debridement of the right knee, possible arthroscopic, possible open procedure.  A risk/benefit analysis was discussed with the patient reviewing the advantages and disadvantages of surgical intervention at this time.  A full explanation was given of the nature and the purpose of the procedure and anesthesia, its benefits, possible alternative methods of diagnosis of treatment, the risks involved, the possibility of complications, the foreseeable consequences of the procedure and the possible results of the non-treatment, as aforementioned.     No guarantee or assurance was made as to the results that may be obtained.  Specifically, the risks were identified to include, but are not limited, to the following: Continued infection, spreading of infection, phlebitis, blood clots including pulmonary embolism, death, paralysis, dislocation, continued pain, stiffness, instability, limp, weakness, intra-operative and post-operative fracture, leg-length inequality, uncontrolled bleeding, nerve injury, blood vessel injury, pressure sores, anesthetic  risks, delayed healing of wound and bone.  Complications may require additional operations and we next expanded on the following complications specifically related to his septic knee arthritis and procedure which can include delayed wound healing, continued infection, need for subsequent surgeries, systemic spreading of infection, iatrogenic cartilage injury.  We discussed the post-operative timeline including the hospital course, medications, pain, activity, weightbearing and rehab protocol and likely course of IV antibiotics.      The patient also understands he is at increased risk of continued infection due to his current history of diabetes.  He is also at risk of stroke and blood clots including increased chance of mortality due to his atrial fibrillation.  However he has been optimized by both medical services and cardiology for this procedure.  His atrial fibrillation and atrial flutter episodes per cardiology last 5 to 10 minutes and then he is in sinus rhythm so they have optimized him with a Cardizem gtt.        The patient understands the risks and benefits of both non-operative and operative management of their septic knee and they are electing for operative management in the form of right knee irrigation and debridement. The patient was instructed to ask questions and all questions were answered today to their satisfaction. The patient has been consented for right knee irrigation debridement, possible arthroscopic possible open.      Pre-operative Planning:  Date of Surgery & Location: [facility] February 1  Anticipated Length of Stay: Inpatient stay  Implants: No implants needed  Antibiotics: Broad-spectrum, continue bank cefepime postoperatively, ID consult  DVT Prophylaxis: Per medicine, we recommend aspirin 81 twice daily  Special Equipment: 9 L saline, arthroscopy tower  Pending Workup: Postoperatively patient will need a nutrition consult due to his albumin of 3.2, continue replacement of  potassium, new A1c and endocrinology consult, strict glycemic control

## 2025-02-01 NOTE — ASSESSMENT & PLAN NOTE
Patient found to be in rapid Afib (HR to 140s) during ER course  No history of Afib, patient asymptomatic  Cardizem gtt + heparin gtt initiated by ER provider, will continue  TTE ordered for AM along with TFTs  Monitor on telemetry, cardiology consulted

## 2025-02-01 NOTE — OR SURGEON
Pre-Procedure patient identification:  I am the primary operating surgeon/proceduralist and I have reviewed the applicable pathology reports and radiology studies for this procedure. I have identified the patient and confirmed laterality is right on 02/01/25 at 10:38 AM Star Osuna MD  Phone Number: [phone]

## 2025-02-01 NOTE — CONSULTS
Cardiology Consult Note    HISTORY OF PRESENT ILLNESS      [place] Martin is a 59 y.o. male who is referred for consultation on 2/1/2025 for new onset atrial fibrillation. He has a PMH of DM and HTN. He reports he was at a colonoscopy earlier this week and was told his heart rate was fast for 10 minutes and they proceeded to do the colonoscopy. He does not know if he has sleep apena but his wife thinks so. No chest pain or palpitations. He presents with a right knee infection and symptoms occurred 1 week ago and he was found to have strep in his knee. Heart rate went up to 150s in the ER and cardizem and heparin gtt ordered. He is going in an out of atrial fibrillation, atrial flutter and sinus rhythm. Never seen a cardiologist before         PAST MEDICAL / SURGICAL / SOCIAL / FAMILY HISTORY     Past Medical History:   Diagnosis Date   • Arthritis     knees   • COVID-19 vaccine series completed     pfizer plus booster   • Diabetes (CMS/HCC)    • Eye injury     Right eye pressure injury   • Hypertension    • Migraine     hasnt had one in approx 2 yrs -2019   • Pulmonary nodules 12/16/2021   • Type 2 diabetes mellitus (CMS/HCC)        Past Surgical History   Procedure Laterality Date   • Colectomy     • Colonoscopy  2021   • COLONOSCOPY, HOT SNARE POLYPECTOMY, TATOOING N/A 12/29/2021    Performed by Tiarra Martinez MD at  GI   • cystoscopy placement rukhsana ureteral catheters Bilateral 12/30/2021    Performed by Nat Payne MD at  OR   • LOW ANTERIOR RESECTION LAPAROSCOPIC ROBOTIC Robotic Takedown Splenic Flexure, Primary Colorectal anastimosis EEA # 28 Placement of ADITYA drain and Peritoneal Lavage N/A 12/30/2021    Performed by Tiarra Martinez MD at  OR       Social History     Socioeconomic History   • Marital status:      Spouse name: Christi    • Number of children: 1   • Years of education: None   • Highest education level: None   Occupational History   • Occupation: Electorical consultant     Tobacco Use   • Smoking status: Never   • Smokeless tobacco: Never   Vaping Use   • Vaping status: Never Used   Substance and Sexual Activity   • Alcohol use: Not Currently   • Drug use: Never   • Sexual activity: Defer     Social Drivers of Health     Food Insecurity: No Food Insecurity (1/31/2025)    Hunger Vital Sign    • Worried About Running Out of Food in the Last Year: Never true    • Ran Out of Food in the Last Year: Never true       Family History   Problem Relation Name Age of Onset   • Other Biological Mother          pacemaker   • Stroke Biological Father     • Diabetes Biological Father     • Heart failure Biological Father     • No Known Problems Biological Sister     • Lung cancer Maternal Grandfather          Smoker 50 years    • Colon cancer Neg Hx     • Rectal cancer Neg Hx         MEDICATIONS     Home Medications:  Prior to Admission medications    Medication Sig Start Date End Date Taking? Authorizing Provider   amLODIPine (NORVASC) 5 mg tablet TAKE 1 TABLET (5 MG TOTAL) BY MOUTH DAILY. 9/26/24 12/25/24  Leland Fishman MD   losartan-hydrochlorothiazide (HYZAAR) 50-12.5 mg per tablet TAKE 2 TABLETS BY MOUTH EVERY DAY 12/2/24   Leland Fishman MD   metFORMIN (GLUCOPHAGE) 1,000 mg tablet TAKE 1 TABLET BY MOUTH TWICE A DAY WITH MEALS 12/4/24   Leland Fishman MD       Current Inpatient Medications:  • [Provider Managed Hold] losartan  100 mg oral Daily    And   • [Provider Managed Hold] hydrochlorothiazide  25 mg oral Daily   • insulin lispro U-100  4-12 Units subcutaneous TID with meals     • dilTIAZem  0-15 mg/hr 15 mg/hr (02/01/25 0346)   • [Provider Managed Hold] heparin infusion - MAR calculator by PTT  100-4,000 Units/hr Stopped (02/01/25 0601)       ALLERGIES     Allergies:  Patient has no known allergies.    REVIEW OF SYSTEMS     Review of Systems:  Review of Systems   HENT: Negative.     Eyes: Negative.    Cardiovascular: Negative.    Respiratory: Negative.      Endocrine: Negative.    Skin: Negative.    Musculoskeletal:  Positive for joint pain.   Gastrointestinal: Negative.    Genitourinary: Negative.    Neurological: Negative.    Psychiatric/Behavioral: Negative.     All other systems reviewed and are negative.      PHYSICAL EXAMINATION     Vital Signs Last 24 Hours:  Temp:  [36.5 °C (97.7 °F)-37.7 °C (99.9 °F)] 36.5 °C (97.7 °F)  Heart Rate:  [] 117  Resp:  [16-18] 18  BP: (109-131)/(62-87) 128/70  No intake or output data in the 24 hours ending 02/01/25 0736    Physical Exam:  Physical Exam  Constitutional:       Appearance: Normal appearance.   HENT:      Head: Normocephalic and atraumatic.      Nose: Nose normal.      Mouth/Throat:      Mouth: Mucous membranes are dry.   Eyes:      Conjunctiva/sclera: Conjunctivae normal.   Cardiovascular:      Rate and Rhythm: Tachycardia present. Rhythm irregularly irregular.      Pulses: Normal pulses.      Heart sounds: Normal heart sounds, S1 normal and S2 normal.   Pulmonary:      Effort: Pulmonary effort is normal.      Breath sounds: Normal breath sounds.   Abdominal:      Palpations: Abdomen is soft.   Musculoskeletal:      Cervical back: Neck supple.      Right lower leg: No edema.      Left lower leg: No edema.      Comments: Right knee swollen    Skin:     General: Skin is warm and dry.   Neurological:      General: No focal deficit present.      Mental Status: He is alert and oriented to person, place, and time.   Psychiatric:         Attention and Perception: Attention and perception normal.         Mood and Affect: Mood normal.         LABS / IMAGING / ECG / TELEMETRY     Labs:  Results from last 7 days   Lab Units 02/01/25  0411 01/31/25  1754   SODIUM mEQ/L 135* 133*   POTASSIUM mEQ/L 3.1* 3.7   CHLORIDE mEQ/L 95* 94*   CO2 mEQ/L 30 27   BUN mg/dL 28* 21   CREATININE mg/dL 1.5* 1.4*   GLUCOSE mg/dL 247* 264*   CALCIUM mg/dL 9.5 10.6*       Results from last 7 days   Lab Units 01/31/25  1754   AST IU/L 16      Results from last 7 days   Lab Units 01/31/25  1754   ALT IU/L 10     Results from last 7 days   Lab Units 02/01/25  0411 01/31/25  1754   WBC K/uL 14.97* 19.69*   HEMOGLOBIN g/dL 11.7* 13.2*   HEMATOCRIT % 35.1* 39.2*   PLATELETS K/uL 358* 463*       Results from last 7 days   Lab Units 02/01/25  0411 01/31/25  2152   HIGH SENSITIVE TROPONIN I pg/mL 12.9 15.6*     PT/PTT Results         02/01/25 01/31/25 01/26/22     0411 2112 0805    PT -- 16.3 13.2    INR -- 1.3 1.0    PTT 31 27 --           Comment for INR at 2112 on 01/31/25: Moderate Intensity Anticoagulation = 2.0 to 3.0, High Intensity = 2.5 to 3.5    Comment for INR at 0805 on 01/26/22: INR has no defined significance when PT is within Reference Range.          Lab Results   Component Value Date    CHOL 217 (H) 08/02/2023    TRIG 172 (H) 08/02/2023    HDL 53 08/02/2023    LDLCALC 133 (H) 08/02/2023    TSH 0.77 02/01/2025    HGBA1C 10.0 (H) 08/02/2023       Imaging:  No results found.      ECG: first 4 beats are sinus then atrial fibrillation with RVR     Telemetry: sinus rhythm, atrial fibrillation with RVR, atrial flutter- personally reviewed by me         ASSESSMENT AND PLAN     Assessment & Plan  Streptococcal arthritis of right knee (CMS/HCC)  Per orthopedics. Acceptable risk for OR   Diabetes mellitus (CMS/HCC)  Per primary team   Primary hypertension  Continue home medications. Replete K.   New onset atrial fibrillation (CMS/HCC)  With atrial fibrillation and atrial flutter. Episodes last 5-10 minutes and then in sinus. Continue Cardizem gtt. Acceptable risk for OR if needed. CHADS2-VASC of 2 and recommend DOAC when no surgical procedures are needed. Can do echocardiogram inpatient or outpatient. Replete K.       Discussed with [facility]     Scott Soliz, DO  2/1/2025

## 2025-02-01 NOTE — ANESTHESIA PREPROCEDURE EVALUATION
Relevant Problems   CARDIOVASCULAR   (+) New onset atrial fibrillation (CMS/HCC)   (+) Primary hypertension      MUSCULOSKELETAL   (+) Streptococcal arthritis of right knee (CMS/HCC)      URINARY SYSTEM   (+) Kidney stone      Other   (+) Diabetes mellitus (CMS/HCC)   (+) Streptococcal arthritis of right knee (CMS/HCC)       Anesthesia ROS/MED HX    Anesthesia History    Previous anesthetics  Pulmonary - neg  Neuro/Psych    Headaches  Cardiovascular   hypertension   Covid19 Test Reviewed and ECG reviewed  Hematological - neg  GI/Hepatic- neg  Musculoskeletal- neg  Renal Disease- neg  Endo/Other   Diabetes  History of cancer and colon cancer  Body Habitus: Obese       Past Surgical History   Procedure Laterality Date    Colectomy      Colonoscopy  2021    COLONOSCOPY, HOT SNARE POLYPECTOMY, TATOOING N/A 12/29/2021    Performed by Tiarra Martinez MD at  GI    cystoscopy placement rukhsana ureteral catheters Bilateral 12/30/2021    Performed by Nat Payne MD at  OR    LOW ANTERIOR RESECTION LAPAROSCOPIC ROBOTIC Robotic Takedown Splenic Flexure, Primary Colorectal anastimosis EEA # 28 Placement of ADITYA drain and Peritoneal Lavage N/A 12/30/2021    Performed by Tiarra Martinez MD at  OR       Physical Exam    Airway   Mallampati: III   TM distance: >3 FB   Neck ROM: full  Cardiovascular    Rhythm: regular   Rate: normalPulmonary - normal   clear to auscultation  Dental - normal        Anesthesia Plan    Plan: general       3 ASA  Blood Products:     Use of Blood Products Discussed: Yes     Consented to blood products  Anesthetic plan and risks discussed with: patient  Induction:    intravenous   Comments:    Plan: Risk of cardiac arrest/aspiration/airway trauma/prolonged intubation/bleeding/nerve injury discussed. Patient would like to proceed.

## 2025-02-01 NOTE — ASSESSMENT & PLAN NOTE
Cultures from outpatient joint aspiration +Strep  Ortho consulted and recommended deferring antibiotics pending washout.  Patient went to OR 2/1 for R knee washout and cultures sent.  IV antibiotics ortho recommends Vanc/cefepime pending ID recommendations/cultures  DVT prophylaxis post op per ortho  Bowel regimen, IS, PT

## 2025-02-01 NOTE — BRIEF OP NOTE
RIGHT KNEE ARTHROSCOPIC WASH OUT (R) Procedure Note    Procedures:    * RIGHT KNEE ARTHROSCOPIC WASH OUT    Pre-op Diagnosis      * Infection of prosthetic knee joint, initial encounter  (CMS/[facility]) [T84.59XA, Z96.659]       Post-op Diagnosis     * Infection of prosthetic knee joint, initial encounter  (CMS/[facility]) [T84.59XA, Z96.659]    Surgeons and Role:     * Star Osuna MD - Primary    Anesthesia: General    Staff:   Circulator: Becca Bailey RN  Scrub Person: Edwar Cruz    Procedure Details   Significant purulent fluid in the joint, I&D    PATOS (Infection Present at Time of Surgery):        Estimated Blood Loss: 5 mL    Specimens:                Order Name Source Comment Collection Info Order Time   ANAEROBIC CULTURE / SMEAR (INCLUDES AEROBIC CULTURE) Knee, Right Pre-op diagnosis:  Infection of prosthetic knee joint, initial encounter (CMS/[facility]) [T84.59XA, Z96.659] Collected By: Star Osuna MD 2/1/2025 12:46 PM     Release to patient   Immediate        SYNOVIAL FLUID CELL COUNT (W/ DIFF) Knee, Right Pre-op diagnosis:  Infection of prosthetic knee joint, initial encounter (CMS/[facility]) [T84.59XA, Z96.659] Collected By: Star Osuna MD 2/1/2025 12:34 PM     Release to patient   Immediate              Drains: * No LDAs found *    Implants: * No implants in log *           Complications:  None; patient tolerated the procedure well.           Disposition: PACU - hemodynamically stable.           Condition: stable    Star Osuna MD  Phone Number: [phone]

## 2025-02-01 NOTE — OP NOTE
Operative Note    Preop Diagnosis: right septic knee   Postop Diagnosis: Same    Procedures: Right knee arthroscopic irrigation and debridement    Surgeon: Star Osuna MD    Assistant(s): Diaz Leon MD    Anesthesia: general    Estimated Blood Loss: minimal    Drains: N/a    Specimens: cell count and culture    Implants: n/a     Complications: N/a    Tourniquet time: 33 minutes    Indications:  [place] Martin is a 59 y.o. male who presents with native  right knee septic arthritis with a cell count of 64,000 and cultures positive for group B strep. Patient indicated for irrigation and debridement right knee.    I had a long discussion with the patient regarding etiology and treatment options.  An extensive discussion was conducted of the natural history of the disease, which typically can include continued infection and possible spreading of infection and sepsis.  It can also cause joint destruction and further development of arthritis.  However it can also sometimes be treated with antibiotics and serial aspirations, however this is usually not curative but it is an option for nonoperative management. Conservative treatment measures may not work for all patients. The benefit of non-operative management is generally fewer complications compared to surgery. There’s no risk of  anesthesia complications, or post-operative rehabilitation issues. Risks of non-operative management include the possibilities of continued infection, spreading of infection, joint destruction. The patient has stated the non-operative management has not sufficiently relived their pain and they would like the best chance of clearing this infection.  I have also counseled the patient that since he has likely had a septic arthritis for over a week at this point, there is a good chance of rapid progression of his osteoarthritis due to the cartilage damage by the bacteria.  The patient understands this.     I also discussed surgical  options with the patient. At this point, based on the patient’s history of septic arthritis growing group B strep he is a candidate for an irrigation and debridement of the right knee, possible arthroscopic, possible open procedure.  A risk/benefit analysis was discussed with the patient reviewing the advantages and disadvantages of surgical intervention at this time.  A full explanation was given of the nature and the purpose of the procedure and anesthesia, its benefits, possible alternative methods of diagnosis of treatment, the risks involved, the possibility of complications, the foreseeable consequences of the procedure and the possible results of the non-treatment, as aforementioned.     No guarantee or assurance was made as to the results that may be obtained.  Specifically, the risks were identified to include, but are not limited, to the following: Continued infection, spreading of infection, phlebitis, blood clots including pulmonary embolism, death, paralysis, dislocation, continued pain, stiffness, instability, limp, weakness, intra-operative and post-operative fracture, leg-length inequality, uncontrolled bleeding, nerve injury, blood vessel injury, pressure sores, anesthetic risks, delayed healing of wound and bone.  Complications may require additional operations and we next expanded on the following complications specifically related to his septic knee arthritis and procedure which can include delayed wound healing, continued infection, need for subsequent surgeries, systemic spreading of infection, iatrogenic cartilage injury.  We discussed the post-operative timeline including the hospital course, medications, pain, activity, weightbearing and rehab protocol and likely course of IV antibiotics.      The patient also understands he is at increased risk of continued infection due to his current history of diabetes.  He is also at risk of stroke and blood clots including increased chance of mortality  due to his atrial fibrillation.  However he has been optimized by both medical services and cardiology for this procedure.  His atrial fibrillation and atrial flutter episodes per cardiology last 5 to 10 minutes and then he is in sinus rhythm so they have optimized him with a Cardizem gtt.          The patient understands the risks and benefits of both non-operative and operative management of their septic knee and they are electing for operative management in the form of right knee irrigation and debridement. The patient was instructed to ask questions and all questions were answered today to their satisfaction. The patient has been consented for right knee irrigation debridement, possible arthroscopic possible open.         Procedure: The patient was identified in preoperative holding area. The operative site was marked informed consent was confirmed.  After transfer to the operating room, appropriate anesthesia and preoperative antibiotics Ancef and vancomycin were administered.  Standard prep and drape of operative limb over well-padded nonsterile thigh tourniquet.  I performed a time out with the operative and anesthesia teams confirming the correct patient, procedure, laterality, as well as intraoperative and post-operative plan.  Limb not exsanguinated but elevated and tourniquet inflated.      A superior medial portal was established and 100 cc gross purulence evacuated and sent for cell count and culture.  This was established as an outflow portal.  A standard anterior lateral portal was established and the arthroscope was introduced into the knee joint.  The joint was systematically examined revealing significant synovial inflammation diffusely throughout knee.  A standard anteromedial portal was then created.  This was used for debridement of the inflamed synovial tissue with a shaver.    Lavage of the knee joint was performed using copious amounts of normal saline to thoroughly wash out the joint.  9 L  of normal saline was utilized.  All compartments of the knee including the suprapatellar pouch, medial lateral gutters, and intercondylar notch, were irrigated.  All inflamed synovial tissue from these compartments were debrided.    The lavage was repeated until the complete 9 L was utilized and evacuated.  Through inspection confirmed that no loose bodies or significant pathological degrees remained.  The synovium now appeared to have no evidence of purulent material after debridement.  The tourniquet was deflated and all bleeders were coagulated.    The arthroscope and instruments were removed, and the portals were closed with #3 Monocryl.  Sterile dressing was applied.  The patient tolerated the procedure well and was transferred to the recovery room in stable condition.        POSTOPERATIVE PLAN TKA:  1.  WBAT the right leg.    2.  DVT ppx: aspirin, and SCDs  3.  Vancomycin and cefepime, ID consult.  Follow-up cultures and follow-up cell count  4.  PT/OT  5.  Nutrition consult        Disposition: PACU stable

## 2025-02-01 NOTE — ANESTHESIA POSTPROCEDURE EVALUATION
Patient: [place] Martin    Procedure Summary       Date: 02/01/25 Room / Location:  [facility] OR 01 /  [facility]    Anesthesia Start: 1152 Anesthesia Stop: 1327    Procedure: RIGHT KNEE ARTHROSCOPIC WASH OUT (Right: Knee) Diagnosis:       Infection of prosthetic knee joint, initial encounter  (CMS/[facility])      (Infection of prosthetic knee joint, initial encounter (CMS/[facility]) [T84.59XA, Z96.659])    Surgeons: Star Osuna MD Responsible Provider: Lenny Morales MD    Anesthesia Type: general ASA Status: 3            Anesthesia Type: general  PACU Vitals    No data found in the last 10 encounters.           Anesthesia Post Evaluation    Pain management: adequate  Patient location during evaluation: PACU  Patient participation: complete - patient participated  Level of consciousness: awake and alert  Cardiovascular status: acceptable  Airway Patency: adequate  Respiratory status: acceptable  Hydration status: acceptable  Anesthetic complications: no

## 2025-02-01 NOTE — H&P
Hospital Medicine    History & Physical        CHIEF COMPLAINT   R knee infection     HISTORY OF PRESENT ILLNESS      L[place] Dandy Martin is a 59 y.o. male with a past medical history of colon cancer (in remission x 3 years), HTN, migraines, T2DM and pulmonary nodules who presents with R knee infection.  Symptoms began 1 week ago with acute onset of R knee swelling without injury.  He was seen at [facility] earlier this week and joint aspiration was performed and the fluid sent for culture. In the time since, the culture has growth Strep. He was referred to the ER for admission.     ER workup notable for leukocytosis, elevated CRP, elevated troponin.  During ER course, patient found to be in rapid Afib with HR to 150s.  Cardizem and Heparin gtt were initiated. Ortho was consulted.    His wife (Christi) is his surrogate decision maker.     PAST MEDICAL AND SURGICAL HISTORY      Past Medical History:   Diagnosis Date    Arthritis     knees    COVID-19 vaccine series completed     pfizer plus booster    Diabetes (CMS/HCC)     Eye injury     Right eye pressure injury    Hypertension     Migraine     hasnt had one in approx 2 yrs -2019    Pulmonary nodules 12/16/2021    Type 2 diabetes mellitus (CMS/HCC)        Past Surgical History   Procedure Laterality Date    Colectomy      Colonoscopy  2021    COLONOSCOPY, HOT SNARE POLYPECTOMY, TATOOING N/A 12/29/2021    Performed by Tiarra Martinez MD at  GI    cystoscopy placement rukhsana ureteral catheters Bilateral 12/30/2021    Performed by Nat Payne MD at  OR    LOW ANTERIOR RESECTION LAPAROSCOPIC ROBOTIC Robotic Takedown Splenic Flexure, Primary Colorectal anastimosis EEA # 28 Placement of ADITYA drain and Peritoneal Lavage N/A 12/30/2021    Performed by Tiarra Martinez MD at  OR       PCP: Leland Fishman MD    MEDICATIONS      Prior to Admission medications    Medication Sig Start Date End Date Taking? Authorizing Provider   amLODIPine (NORVASC) 5  mg tablet TAKE 1 TABLET (5 MG TOTAL) BY MOUTH DAILY. 9/26/24 12/25/24  Leland Fishman MD   losartan-hydrochlorothiazide (HYZAAR) 50-12.5 mg per tablet TAKE 2 TABLETS BY MOUTH EVERY DAY 12/2/24   Leland Fishman MD   metFORMIN (GLUCOPHAGE) 1,000 mg tablet TAKE 1 TABLET BY MOUTH TWICE A DAY WITH MEALS 12/4/24   Leland Fishman MD       ALLERGIES      Patient has no known allergies.    FAMILY HISTORY      Family History   Problem Relation Name Age of Onset    Other Biological Mother          pacemaker    Stroke Biological Father      Diabetes Biological Father      Heart failure Biological Father      No Known Problems Biological Sister      Lung cancer Maternal Grandfather          Smoker 50 years     Colon cancer Neg Hx      Rectal cancer Neg Hx         SOCIAL HISTORY      Social History     Substance and Sexual Activity   Drug Use Never     Tobacco Use: Low Risk  (1/31/2025)    Patient History     Smoking Tobacco Use: Never     Smokeless Tobacco Use: Never     Passive Exposure: Not on file     Alcohol Use: Not At Risk (12/30/2021)    AUDIT-C     Frequency of Alcohol Consumption: Monthly or less     Average Number of Drinks: 1 or 2     Frequency of Binge Drinking: Never     Within the past week have you used heroin or used opioid medications other than as prescribed? (OxyContin, Fentanyl, Subutex): No  Do you get sick if you cannot use heroin, methadone, or opioid medications?: No         REVIEW OF SYSTEMS      All other systems reviewed and negative except as noted in HPI    PHYSICAL EXAMINATION      Temp:  [37.3 °C (99.1 °F)-37.7 °C (99.9 °F)] 37.3 °C (99.1 °F)  Heart Rate:  [] 131  Resp:  [16-18] 16  BP: (109-126)/(62-87) 123/87  Body mass index is 35.36 kg/m².    Physical Exam  Vitals reviewed.   Constitutional:       General: He is not in acute distress.     Appearance: He is obese. He is not ill-appearing.   HENT:      Head: Normocephalic.   Eyes:      Extraocular Movements: Extraocular  movements intact.      Pupils: Pupils are equal, round, and reactive to light.   Cardiovascular:      Rate and Rhythm: Tachycardia present. Rhythm irregularly irregular.   Pulmonary:      Effort: Pulmonary effort is normal.      Breath sounds: Normal breath sounds.   Abdominal:      General: Bowel sounds are normal.      Palpations: Abdomen is soft.      Tenderness: There is no abdominal tenderness.   Musculoskeletal:      Right knee: Swelling present. No erythema. No tenderness.   Skin:     General: Skin is warm.      Capillary Refill: Capillary refill takes less than 2 seconds.      Findings: No erythema.   Neurological:      General: No focal deficit present.      Mental Status: He is alert and oriented to person, place, and time.   Psychiatric:         Mood and Affect: Mood normal.         LABS / IMAGING / EKG        Labs  I have reviewed the patient's pertinent labs.  Significant abnormals are glucose 264, calcium 10.6, troponin 15.6, troponin 247 --> 242.10, WBC 19.69, sed rate 118.    Imaging      ECG/Telemetry  Afib w/RVR @ 148bpm    ASSESSMENT AND PLAN              Assessment & Plan  Streptococcal arthritis of right knee (CMS/HCC)  Cultures from outpatient joint aspiration +Strep  Ortho consulted and recommends deferring antibiotics for now  Diabetes mellitus (CMS/HCC)  ADA diet, POCT glucose qACHS  SSI coverage (PRN), hold Metformin  Primary hypertension  BP remains stable, continue to monitor  New onset atrial fibrillation (CMS/HCC)  Patient found to be in rapid Afib (HR to 140s) during ER course  No history of Afib, patient asymptomatic  Cardizem gtt + heparin gtt initiated by ER provider, will continue  TTE ordered for AM along with TFTs  Monitor on telemetry, cardiology consulted    VTE Assessment: Padua VTE Score: 2  VTE Prophylaxis: Current anticoagulants:  heparin (porcine) bolus from bag 4,250-8,500 Units, intravenous, q6h PRN  heparin infusion in D5W 100 units/mL, intravenous, Titrated      Code  Status: Full Code  Palliative Care Screening Score: 0   Discussed advanced care planning. Lake has an ACP and Lake's surrogate decision maker is Christi.  Estimated Discharge Date: 2/4/2025  Disposition Planning: pending clinical response     Elaina Ceja MD  2/1/2025

## 2025-02-01 NOTE — ANESTHESIOLOGIST PRE-PROCEDURE ATTESTATION
Pre-Procedure Patient Identification:  I am the Primary Anesthesiologist and have identified the patient on 02/01/25 at 11:01 AM.   I have confirmed the procedure(s) will be performed by the following surgeon/proceduralist Star sOuna MD.

## 2025-02-01 NOTE — ASSESSMENT & PLAN NOTE
Patient found to be in rapid Afib (HR to 140s) during ER course  No history of Afib, patient asymptomatic  Cardizem gtt + heparin gtt initiated by ER provider, will continue  TTE pending  TFT's  Monitor on telemetry, cardiology consulted  Cardiology recommends continued diltiazem perioperatively  Patient in and out of afib  Heparin gtt can be resumed 2/2 per discussion with ortho

## 2025-02-01 NOTE — ANESTHESIA PROCEDURE NOTES
Airway  Urgency: elective    Start Time: 2/1/2025 11:59 AM    General Information and Staff    Patient location during procedure: OR  Anesthesiologist: Lenny Morales MD  Resident/CRNA: Kristel Saucedo CRNA  Performed by: Kristel Saucedo CRNA  Authorized by: Lenny Morales MD      Indications and Patient Condition  Indications for airway management: anesthesia  Sedation level: general  Preoxygenated: yes  Patient position: sniffing  Mask difficulty assessment: 0 - not attempted    Final Airway Details  Final airway type: supraglottic airway      Successful airway: iGel  Size 5     Number of attempts at approach: 1  Number of other approaches attempted: 0  Atraumatic airway insertion

## 2025-02-02 LAB
ANION GAP SERPL CALC-SCNC: 9 MEQ/L (ref 3–15)
BUN SERPL-MCNC: 27 MG/DL (ref 7–25)
CALCIUM SERPL-MCNC: 9.1 MG/DL (ref 8.6–10.3)
CHLORIDE SERPL-SCNC: 97 MEQ/L (ref 98–107)
CO2 SERPL-SCNC: 30 MEQ/L (ref 21–31)
CREAT SERPL-MCNC: 1.1 MG/DL (ref 0.7–1.3)
DATE+TIME DOSE: 400
DATE+TIME DOSE: ABNORMAL
EGFRCR SERPLBLD CKD-EPI 2021: >60 ML/MIN/1.73M*2
ERYTHROCYTE [DISTWIDTH] IN BLOOD BY AUTOMATED COUNT: 13.2 % (ref 11.6–14.4)
GLUCOSE BLD-MCNC: 221 MG/DL (ref 70–99)
GLUCOSE BLD-MCNC: 223 MG/DL (ref 70–99)
GLUCOSE BLD-MCNC: 228 MG/DL (ref 70–99)
GLUCOSE BLD-MCNC: 304 MG/DL (ref 70–99)
GLUCOSE SERPL-MCNC: 216 MG/DL (ref 70–99)
HCT VFR BLD AUTO: 32.3 % (ref 40.1–51)
HGB BLD-MCNC: 10.4 G/DL (ref 13.7–17.5)
MCH RBC QN AUTO: 30 PG (ref 28–33.2)
MCHC RBC AUTO-ENTMCNC: 32.2 G/DL (ref 32.2–36.5)
MCV RBC AUTO: 93.1 FL (ref 83–98)
PLATELET # BLD AUTO: 329 K/UL (ref 150–350)
PMV BLD AUTO: 10.5 FL (ref 9.4–12.4)
POCT TEST: ABNORMAL
POTASSIUM SERPL-SCNC: 3.6 MEQ/L (ref 3.5–5.1)
RBC # BLD AUTO: 3.47 M/UL (ref 4.5–5.8)
SODIUM SERPL-SCNC: 136 MEQ/L (ref 136–145)
VANCOMYCIN TROUGH SERPL-MCNC: 29.1 UG/ML (ref 10–15)
WBC # BLD AUTO: 12.63 K/UL (ref 3.8–10.5)

## 2025-02-02 PROCEDURE — 63700000 HC SELF-ADMINISTRABLE DRUG: Performed by: HOSPITALIST

## 2025-02-02 PROCEDURE — 36415 COLL VENOUS BLD VENIPUNCTURE: CPT | Performed by: HOSPITALIST

## 2025-02-02 PROCEDURE — 20600000 HC ROOM AND CARE INTERMEDIATE/TELEMETRY

## 2025-02-02 PROCEDURE — 63600000 HC DRUGS/DETAIL CODE: Mod: JZ,TB | Performed by: HOSPITALIST

## 2025-02-02 PROCEDURE — 25800000 HC PHARMACY IV SOLUTIONS: Performed by: HOSPITALIST

## 2025-02-02 PROCEDURE — 97166 OT EVAL MOD COMPLEX 45 MIN: CPT | Mod: GO

## 2025-02-02 PROCEDURE — 25000000 HC PHARMACY GENERAL: Performed by: HOSPITALIST

## 2025-02-02 PROCEDURE — 97116 GAIT TRAINING THERAPY: CPT | Mod: GP

## 2025-02-02 PROCEDURE — 99233 SBSQ HOSP IP/OBS HIGH 50: CPT | Performed by: HOSPITALIST

## 2025-02-02 PROCEDURE — 80048 BASIC METABOLIC PNL TOTAL CA: CPT | Performed by: HOSPITALIST

## 2025-02-02 PROCEDURE — 80202 ASSAY OF VANCOMYCIN: CPT | Performed by: HOSPITALIST

## 2025-02-02 PROCEDURE — 97162 PT EVAL MOD COMPLEX 30 MIN: CPT | Mod: GP

## 2025-02-02 PROCEDURE — 97530 THERAPEUTIC ACTIVITIES: CPT | Mod: GO

## 2025-02-02 PROCEDURE — 85027 COMPLETE CBC AUTOMATED: CPT | Performed by: HOSPITALIST

## 2025-02-02 PROCEDURE — 25800000 HC PHARMACY IV SOLUTIONS: Performed by: INTERNAL MEDICINE

## 2025-02-02 PROCEDURE — 63600000 HC DRUGS/DETAIL CODE: Mod: JZ | Performed by: INTERNAL MEDICINE

## 2025-02-02 RX ORDER — INSULIN LISPRO 100 [IU]/ML
3 INJECTION, SOLUTION INTRAVENOUS; SUBCUTANEOUS
Status: DISCONTINUED | OUTPATIENT
Start: 2025-02-02 | End: 2025-02-03

## 2025-02-02 RX ORDER — ASPIRIN 81 MG/1
81 TABLET ORAL 2 TIMES DAILY
Status: DISCONTINUED | OUTPATIENT
Start: 2025-02-02 | End: 2025-02-04

## 2025-02-02 RX ORDER — INSULIN GLARGINE 100 [IU]/ML
10 INJECTION, SOLUTION SUBCUTANEOUS DAILY
Status: DISCONTINUED | OUTPATIENT
Start: 2025-02-02 | End: 2025-02-03

## 2025-02-02 RX ADMIN — CEFTAZIDIME 1 G: 1 INJECTION, POWDER, FOR SOLUTION INTRAMUSCULAR; INTRAVENOUS at 08:36

## 2025-02-02 RX ADMIN — INSULIN LISPRO 10 UNITS: 100 INJECTION, SOLUTION INTRAVENOUS; SUBCUTANEOUS at 13:17

## 2025-02-02 RX ADMIN — VANCOMYCIN HYDROCHLORIDE 1250 MG: 500 INJECTION, POWDER, LYOPHILIZED, FOR SOLUTION INTRAVENOUS at 11:50

## 2025-02-02 RX ADMIN — OXYCODONE 5 MG: 5 TABLET ORAL at 08:31

## 2025-02-02 RX ADMIN — INSULIN LISPRO 3 UNITS: 100 INJECTION, SOLUTION INTRAVENOUS; SUBCUTANEOUS at 13:17

## 2025-02-02 RX ADMIN — CEFTAZIDIME 1 G: 1 INJECTION, POWDER, FOR SOLUTION INTRAMUSCULAR; INTRAVENOUS at 16:11

## 2025-02-02 RX ADMIN — INSULIN LISPRO 6 UNITS: 100 INJECTION, SOLUTION INTRAVENOUS; SUBCUTANEOUS at 09:43

## 2025-02-02 RX ADMIN — ASPIRIN 81 MG: 81 TABLET, COATED ORAL at 08:31

## 2025-02-02 RX ADMIN — INSULIN LISPRO 6 UNITS: 100 INJECTION, SOLUTION INTRAVENOUS; SUBCUTANEOUS at 18:22

## 2025-02-02 RX ADMIN — ASPIRIN 81 MG: 81 TABLET, COATED ORAL at 14:37

## 2025-02-02 RX ADMIN — VANCOMYCIN HYDROCHLORIDE 1250 MG: 500 INJECTION, POWDER, LYOPHILIZED, FOR SOLUTION INTRAVENOUS at 03:32

## 2025-02-02 RX ADMIN — HYDROMORPHONE HYDROCHLORIDE 0.5 MG: 1 INJECTION, SOLUTION INTRAMUSCULAR; INTRAVENOUS; SUBCUTANEOUS at 19:35

## 2025-02-02 RX ADMIN — ACETAMINOPHEN 650 MG: 325 TABLET ORAL at 08:31

## 2025-02-02 RX ADMIN — INSULIN LISPRO 3 UNITS: 100 INJECTION, SOLUTION INTRAVENOUS; SUBCUTANEOUS at 18:21

## 2025-02-02 RX ADMIN — INSULIN GLARGINE 10 UNITS: 100 INJECTION, SOLUTION SUBCUTANEOUS at 10:16

## 2025-02-02 RX ADMIN — CEFAZOLIN 2 G: 2 INJECTION, POWDER, FOR SOLUTION INTRAMUSCULAR; INTRAVENOUS at 18:26

## 2025-02-02 ASSESSMENT — COGNITIVE AND FUNCTIONAL STATUS - GENERAL
MOVING TO AND FROM BED TO CHAIR: 2 - A LOT
DRESSING REGULAR UPPER BODY CLOTHING: 4 - NONE
MOVING TO AND FROM BED TO CHAIR: 2 - A LOT
HELP NEEDED FOR PERSONAL GROOMING: 4 - NONE
DRESSING REGULAR LOWER BODY CLOTHING: 2 - A LOT
WALKING IN HOSPITAL ROOM: 3 - A LITTLE
AFFECT: WFL
WALKING IN HOSPITAL ROOM: 3 - A LITTLE
STANDING UP FROM CHAIR USING ARMS: 2 - A LOT
EATING MEALS: 4 - NONE
TOILETING: 4 - NONE
AFFECT: WFL
CLIMB 3 TO 5 STEPS WITH RAILING: 2 - A LOT
CLIMB 3 TO 5 STEPS WITH RAILING: 2 - A LOT
STANDING UP FROM CHAIR USING ARMS: 2 - A LOT
HELP NEEDED FOR BATHING: 3 - A LITTLE

## 2025-02-02 NOTE — PLAN OF CARE
Plan of Care Review  Progress: improving  Outcome Evaluation: Patient is AAOx4, normal sinus on the monitor, right knee swelling, WBAT. Patient continent of bowel and bladder clear on room air LR running at 80 ml/hr. Call bell within reach and bed in the lowest position.

## 2025-02-02 NOTE — HOSPITAL COURSE
Lake is a 59 y.o. male admitted on 1/31/2025 with New onset atrial fibrillation (CMS/[facility]) [I48.91]  Pre-op evaluation [Z01.818]  Atrial fibrillation with rapid ventricular response (CMS/[facility]) [I48.91]  Staphylococcal arthritis of right knee (CMS/[facility]) [M00.061]. Principal problem is Streptococcal arthritis of right knee (CMS/[facility]).    Past Medical History  Lake has a past medical history of Arthritis, COVID-19 vaccine series completed, Diabetes (CMS/[facility]), Eye injury, Hypertension, Migraine, Pulmonary nodules (12/16/2021), and Type 2 diabetes mellitus (CMS/[facility]).    History of Present Illness   Pt presents with R knee infection.  Symptoms began 1 week ago with acute onset of R knee swelling without injury.  He was seen at [facility] earlier this week and joint aspiration was performed and the fluid sent for culture. In the time since, the culture has growth Strep. He was referred to the ER for admission.     S/p wash out of R knee, performed by Dr. Osuna on 2/1/25

## 2025-02-02 NOTE — PROGRESS NOTES
Vancomycin Dosing by Pharmacy Consult Discontinued    IV Vancomycin Dosing by Pharmacy Protocol was discontinued.  Pharmacy will sign off and no longer dose vancomycin for this patient.    Terrell Patel, h

## 2025-02-02 NOTE — ASSESSMENT & PLAN NOTE
With atrial fibrillation and atrial flutter. Episodes last 5-10 minutes and then in sinus. Continue Cardizem gtt. Acceptable risk for OR if needed. CHADS2-VASC of 2 and recommend DOAC when no surgical procedures are needed. Can do echocardiogram inpatient or outpatient. Replete K.     2/2-in sinus now.It was likely a stress response to infection. Would recommend DOAC when able

## 2025-02-02 NOTE — PROGRESS NOTES
Cardiology Daily Progress Note    SUBJECTIVE      Lake Dandy Martin is a 59 y.o. male who was admitted for New onset atrial fibrillation (CMS/HCC) [I48.91]  Pre-op evaluation [Z01.818]  Atrial fibrillation with rapid ventricular response (CMS/HCC) [I48.91]  Staphylococcal arthritis of right knee (CMS/HCC) [M00.061].    INTERVAL HISTORY:  Patient seen and examined     MEDICATIONS     CURRENT IP MEDS:  • aspirin  81 mg oral BID   • cefTAZidime  1 g intravenous q8h INT   • [Provider Managed Hold] losartan  100 mg oral Daily    And   • [Provider Managed Hold] hydrochlorothiazide  25 mg oral Daily   • insulin glargine U-100  10 Units subcutaneous Daily   • insulin lispro U-100  3 Units subcutaneous TID with meals   • insulin lispro U-100  4-12 Units subcutaneous TID with meals   • vancomycin  1,250 mg intravenous q8h INT     • dilTIAZem  0-15 mg/hr Stopped (02/01/25 1525)   • [Provider Managed Hold] heparin infusion - MAR calculator by PTT  100-4,000 Units/hr Stopped (02/01/25 0601)       OBJECTIVE     Vital signs in last 24 hours:  Temp:  [36.3 °C (97.3 °F)-37.2 °C (99 °F)] 37.1 °C (98.7 °F)  Heart Rate:  [66-86] 70  Resp:  [10-20] 16  BP: (112-134)/(58-77) 132/76    No intake or output data in the 24 hours ending 02/02/25 1354      WEIGHTS  Wt Readings from Last 3 Encounters:   02/01/25 132 kg (290 lb 8 oz)   12/06/24 (!) 140 kg (307 lb 12.8 oz)   09/13/24 (!) 140 kg (308 lb)       PHYSICAL EXAM:  Physical Exam  Constitutional:       Appearance: Normal appearance.   HENT:      Head: Normocephalic and atraumatic.      Nose: Nose normal.      Mouth/Throat:      Mouth: Mucous membranes are dry.   Eyes:      Conjunctiva/sclera: Conjunctivae normal.   Cardiovascular:      Rate and Rhythm: Normal rate and regular rhythm.      Pulses: Normal pulses.      Heart sounds: Normal heart sounds, S1 normal and S2 normal.   Pulmonary:      Effort: Pulmonary effort is normal.      Breath sounds: Normal breath sounds.   Abdominal:       Palpations: Abdomen is soft.   Musculoskeletal:      Cervical back: Neck supple.      Right lower leg: No edema.      Left lower leg: No edema.      Comments: Right knee bandaged    Skin:     General: Skin is warm and dry.   Neurological:      General: No focal deficit present.      Mental Status: He is alert and oriented to person, place, and time.   Psychiatric:         Attention and Perception: Attention and perception normal.         Mood and Affect: Mood normal.         LABS / IMAGING / EKG / TELEMETRY     LABS:   Results from last 7 days   Lab Units 02/02/25  0544 02/01/25  0411 01/31/25  1754   SODIUM mEQ/L 136 135* 133*   POTASSIUM mEQ/L 3.6 3.1* 3.7   CHLORIDE mEQ/L 97* 95* 94*   CO2 mEQ/L 30 30 27   BUN mg/dL 27* 28* 21   CREATININE mg/dL 1.1 1.5* 1.4*   GLUCOSE mg/dL 216* 247* 264*   CALCIUM mg/dL 9.1 9.5 10.6*       Results from last 7 days   Lab Units 01/31/25  1754   AST IU/L 16     Results from last 7 days   Lab Units 01/31/25  1754   ALT IU/L 10     Results from last 7 days   Lab Units 02/02/25  0544 02/01/25  0411 01/31/25  1754   WBC K/uL 12.63* 14.97* 19.69*   HEMOGLOBIN g/dL 10.4* 11.7* 13.2*   HEMATOCRIT % 32.3* 35.1* 39.2*   PLATELETS K/uL 329 358* 463*       Results from last 7 days   Lab Units 02/01/25  0411 01/31/25  2152   HIGH SENSITIVE TROPONIN I pg/mL 12.9 15.6*     PT/PTT Results         02/01/25 01/31/25 01/26/22 0411 2112 0805    PT -- 16.3 13.2    INR -- 1.3 1.0    PTT 31 27 --           Comment for INR at 2112 on 01/31/25: Moderate Intensity Anticoagulation = 2.0 to 3.0, High Intensity = 2.5 to 3.5    Comment for INR at 0805 on 01/26/22: INR has no defined significance when PT is within Reference Range.          Lab Results   Component Value Date    CHOL 217 (H) 08/02/2023    TRIG 172 (H) 08/02/2023    HDL 53 08/02/2023    LDLCALC 133 (H) 08/02/2023    TSH 0.77 02/01/2025    HGBA1C 9.6 (H) 02/01/2025       Imaging  No results found.      ECG   No new ECG.    TELEMETRY sinus  rhythm. Reviewed       ASSESSMENT AND PLAN     Assessment & Plan  Streptococcal arthritis of right knee (CMS/HCC)  Per orthopedics. S/p I and D in the OR  Diabetes mellitus (CMS/[facility])  Per primary team   Primary hypertension  Continue home medications. Replete K.   New onset atrial fibrillation (CMS/[facility])  With atrial fibrillation and atrial flutter. Episodes last 5-10 minutes and then in sinus. Continue Cardizem gtt. Acceptable risk for OR if needed. CHADS2-VASC of 2 and recommend DOAC when no surgical procedures are needed. Can do echocardiogram inpatient or outpatient. Replete K.     2/2-in sinus now.It was likely a stress response to infection. Would recommend DOAC when able   Hypokalemia  Replete     Discussed with [facility] [place]     Scott Soliz, DO  2/2/2025

## 2025-02-02 NOTE — PROGRESS NOTES
Occupational Therapy -  Initial Evaluation     Patient: Lake Martin  Location: [address]  MRN: [id number]  Today's date: 2/2/2025    HISTORY OF PRESENT ILLNESS     Lake is a 59 y.o. male admitted on 1/31/2025 with New onset atrial fibrillation (CMS/HCC) [I48.91]  Pre-op evaluation [Z01.818]  Atrial fibrillation with rapid ventricular response (CMS/HCC) [I48.91]  Staphylococcal arthritis of right knee (CMS/HCC) [M00.061]. Principal problem is Streptococcal arthritis of right knee (CMS/HCC).    Past Medical History  Lake has a past medical history of Arthritis, COVID-19 vaccine series completed, Diabetes (CMS/[facility]), Eye injury, Hypertension, Migraine, Pulmonary nodules (12/16/2021), and Type 2 diabetes mellitus (CMS/[facility]).    History of Present Illness   Pt presents with R knee infection.  Symptoms began 1 week ago with acute onset of R knee swelling without injury.  He was seen at [facility] earlier this week and joint aspiration was performed and the fluid sent for culture. In the time since, the culture has growth Strep. He was referred to the ER for admission.     S/p wash out of R knee, performed by Dr. Osuna on 2/1/25  PRIOR LEVEL OF FUNCTION AND LIVING ENVIRONMENT     Prior Level of Function      Flowsheet Row Most Recent Value   Dominant Hand right   Ambulation assistive equipment   Transferring independent   Toileting independent   Bathing independent   Dressing independent   Eating independent   IADLs independent   Driving/Transportation    Communication understands/communicates without difficulty   Prior Level of Function Comment Pt reports mod I with SPC. Independent with ADLs/IADLs. +Drives.    Assistive Device Currently Used at Home cane, straight             Prior Living Environment      Flowsheet Row Most Recent Value   People in Home spouse, child(beni), adult   Current Living Arrangements home   Home Accessibility not wheelchair accessible    Living Environment Comment 2SH, 1STE, [place], FF to 2nd floor, WIS without DME. Standard height toilet. FFSU available          Occupational Profile      Flowsheet Row Most Recent Value   Environmental Supports and Barriers supportive spouse          VITALS AND PAIN     OT Vitals      Date/Time Pulse HR Source Resp SpO2 Pt Activity O2 Therapy BP BP Location BP Method Pt Position Observations [facility]   02/02/25 1445 79 Monitor 17 99 % At rest None (Room air) 129/77 Left upper arm Automatic Lying -- DB   02/02/25 1449 79 -- -- 98 % At rest None (Room air) 126/74 Right upper arm Automatic Sitting -- SG   02/02/25 1508 83 -- -- 100 % At rest None (Room air) 156/92 Left upper arm Automatic Sitting post amb SG          OT Pain      Date/Time Pain Type Side/Orientation Location Rating: Rest Rating: Activity Interventions [facility]   02/02/25 1449 Pain Assessment knee right 2 - mild pain 2 - mild pain position adjusted SG             Objective   OBJECTIVE     Start time:  1445  End time:  1511  Session Length: 26 min  Mode of Treatment: co-treatment  Reason for co-treatment: d/c planning    General Observations  Patient received upright, in chair. He was agreeable to therapy. pleasant and cooperative    Precautions: fall, weight bearing  Extremity Weight-Bearing Status: right lower extremity  Right LE Weight-Bearing Status: weight-bearing as tolerated (WBAT)     Services  Do You Speak a Language Other Than English at Home?: no  Is an  Needed/Used?: No      OT Eval and Treat - 02/02/25 1445          Cognition    Orientation Status oriented x 4     Affect/Mental Status WFL     Follows Commands WFL     Cognitive Function WFL        Vision Assessment/Intervention    Vision Assessment corrective lenses full-time        Hearing Assessment    Hearing Status WFL        Sensory Assessment    Sensory Assessment sensation intact, upper extremities        Upper Extremity Assessment    UE Assessment ROM and Strength WFL         Bed Mobility    [place] not tested     Comment OOB in recliner        Mobility Belt    Mobility Belt Used During Session yes        Sit/Stand Transfer    Surface chair with arm rests     [place] minimum assist (75% or more patient effort);2 person assist     Safety/Cues increased time to complete;verbal cues     Assistive Device walker, front-wheeled        Toilet Transfer    [place] not tested     Comment declined        Functional Mobility    Distance in room/bathroom;hallway     Functional Mobility [place] close supervision     Safety/Cues increased time to complete;verbal cues;tactile cues     Assistive Device walker, front-wheeled        Lower Body Dressing    Tasks don;socks;doff     [place] minimum assist (75% or more patient effort)     Comment patient said his spouse can assist with ADLs        Balance    Static Sitting Balance WFL     Dynamic Sitting Balance mild impairment     Sit to Stand Dynamic Balance moderate impairment     Static Standing Balance mild impairment     Dynamic Standing Balance mild impairment                                              Education Documentation  Self-Care, taught by Jo Ann Hernandez OT at 2/2/2025  4:18 PM.  Learner: Patient  Readiness: Acceptance  Method: Explanation  Response: Verbalizes Understanding  Comment: OT transfer safety        Session Outcome  Patient upright, in chair at end of session, all needs met, call light in reach, personal items in reach. Nursing notified about patient's performance.    AM-PAC™ - ADL (Current Function)     Putting on/taking off regular lower body clothing 2 - A Lot   Bathing 3 - A Little   Toileting 4 - None   Putting on/taking off regular upper body clothing 4 - None   Help for taking care of personal grooming 4 - None   Eating meals 4 - None   AM-PAC™ ADL Score 21      ASSESSMENT AND PLAN     Progress Summary  Patient seen for OT IE. OOB in chair. Min assist x2 for transfers, patient rocks and does  struggle with transfers partially due to height. Close supervision for mobility with RW. Declined toilet transfer. Patient's spouse can assist with ADLs. Continue OT services to address limitations with ADLs and mobility.    Patient/Family Therapy Goal Statement: to go home    OT Plan      Flowsheet Row Most Recent Value   Rehab Potential good, to achieve stated therapy goals at 02/02/2025 1445   Therapy Frequency 3 times/wk at 02/02/2025 1445   Planned Therapy Interventions activity tolerance training, transfer/mobility retraining, occupation/activity based interventions, functional balance retraining, BADL retraining at 02/02/2025 1445            OT Discharge Recommendations      Flowsheet Row Most Recent Value   OT Recommended Discharge Disposition home with assistance at 02/02/2025 1445   Anticipated Equipment Needs if Discharged Home (OT) shower chair, walker, front-wheeled, commode, 3-in-1 at 02/02/2025 1445                 OT Goals      Flowsheet Row Most Recent Value   Bed Mobility Goal 1    Activity/Assistive Device bed mobility activities, all at 02/02/2025 1445   [place] modified independence at 02/02/2025 1445   Time Frame by discharge at 02/02/2025 1445   Progress/Outcome continuing progress toward goal at 02/02/2025 1445   Transfer Goal 1    Activity/Assistive Device all transfers at 02/02/2025 1445   [place] modified independence at 02/02/2025 1445   Time Frame 1 week at 02/02/2025 1445   Progress/Outcome continuing progress toward goal at 02/02/2025 1445   Dressing Goal 1    Activity/Adaptive Equipment dressing skills, all at 02/02/2025 1445   [place] modified independence at 02/02/2025 1445   Time Frame by discharge at 02/02/2025 1445   Progress/Outcome continuing progress toward goal at 02/02/2025 1445

## 2025-02-02 NOTE — PROGRESS NOTES
Department of Hospital Medicine -  Daily Progress Note       SUBJECTIVE   Patient seen and examined earlier this morning.   Resting in bed  OR yesterday for septic joint washout  Afebrile  No CP or SOB  Pain controlled  Discussed with cardiology and orthopedic surgery     OBJECTIVE      Vital signs in last 24 hours:  Temp:  [36.3 °C (97.3 °F)-37.2 °C (99 °F)] 37.1 °C (98.7 °F)  Heart Rate:  [66-86] 70  Resp:  [10-20] 16  BP: (112-134)/(58-77) 132/76    PHYSICAL EXAMINATION        General:  no acute distress  HEENT:  PERRL, anicteric sclera   Neck: supple, no JVD  Cardiac: RRR, +S1/S2   Lungs: clear  Abdomen: soft, NT/ND, +BS, no rebound/guarding   Extremities: no edema, distal perfusion intact  Neuro: Awake, alert, oriented, nonfocal.   Skin: no rash   Psych: cooperative     LABS / IMAGING / TELE      Labs (personally reviewed):  Results from last 7 days   Lab Units 02/02/25  0544   SODIUM mEQ/L 136   POTASSIUM mEQ/L 3.6   CHLORIDE mEQ/L 97*   CO2 mEQ/L 30   BUN mg/dL 27*   CREATININE mg/dL 1.1   GLUCOSE mg/dL 216*   CALCIUM mg/dL 9.1          Results from last 7 days   Lab Units 02/02/25  0544   WBC K/uL 12.63*   HEMOGLOBIN g/dL 10.4*   HEMATOCRIT % 32.3*   PLATELETS K/uL 329       Results from last 7 days   Lab Units 01/31/25  2112   INR  1.3          Microbiology Data (personally reviewed):  Microbiology Results       ** No results found for the last 720 hours. **            Imaging  I have independently reviewed the pertinent imaging from the last 24 hrs.    ECG/Telemetry  Telemetry reviewed    ASSESSMENT AND PLAN           Assessment & Plan  Streptococcal arthritis of right knee (CMS/HCC)  Cultures from outpatient joint aspiration +Strep  Ortho consulted; patient went to OR 2/1 for R knee washout and cultures sent.  IV antibiotics ortho recommends Vanc/cefepime pending ID recommendations/cultures  ID consult pending  DVT prophylaxis post op; ASA 81mg bid per ortho  Bowel regimen, IS, PT   Diabetes mellitus  (CMS/[facility])  ADA diet, POCT glucose qACHS  SSI coverage (PRN), hold Metformin  Control suboptimal. Insulin regimen adjusted, added basal insulin  Primary hypertension  Hold losartan/hctz  New onset atrial fibrillation (CMS/HCC)  Patient found to be in rapid Afib (HR to 140s) during ER course  No history of Afib, patient asymptomatic  Cardizem gtt + heparin gtt initiated by ER provider  TTE pending  TFT's  Monitor on telemetry, cardiology consulted  Cardiology recommends starting a DOAC when able, when no further procedure anticipated. No need for heparin gtt at this time post op per discussion with cardiology.    Hypokalemia  Monitor and replete prn      VTE Prophylaxis Plan: Current anticoagulants:  [Provider Managed Hold] heparin (porcine) bolus from bag 4,250-8,500 Units, intravenous, q6h PRN  [Provider Managed Hold] heparin infusion in D5W 100 units/mL, intravenous, Titrated    ASA 81mg bid    Code Status: Full Code    Estimated Discharge Date: 2/4/2025       Scott Kuo DO  2/2/2025  1:42 PM

## 2025-02-02 NOTE — PLAN OF CARE
Care Coordination Admission Assessment Note    General Information:  Readmission Within the last 30 days: no previous admission in last 30 days  Does patient have a : No  Patient-Specific Goals (include timeframe): To go home    Living Arrangements:  Arrived From: home  Current Living Arrangements: home  People in Home: spouse, child(beni), adult  Home Accessibility: not wheelchair accessible  Living Arrangement Comments: Patient lives in a multi-story home with spouse and 18 year old son.  Independent at home.    Housing Stability and Utility Access (SDOH):  In the last 12 months, was there a time when you were not able to pay the mortgage or rent on time?: No  In the past 12 months, how many times have you moved?: 0  At any time in the past 12 months, were you homeless or living in a shelter (including now)?: No  In the past 12 months has the electric, gas, oil, or water company threatened to shut off services in your home?: No    Functional Status Prior to Admission:   Assistive Device/Animal Currently Used at Home: cane, straight  Functional Status Comments: Independent at home  IADL Comments: Independent     Supports and Services:  Current Outpatient/Agency/Support Group: none  Type of Current Home Care Services:    History of home care episode or rehab stay: None noted/identified    Discharge Needs Assessment:   Concerns to be Addressed: no discharge needs identified  Current Discharge Risk:    Anticipated Changes Related to Illness: none    Patient/Family Anticipated Discharge Plan:  Patient/Family Anticipates Transition To: home  Patient/Family Anticipated Services at Transition: none    Connection to Community  Not applicable    Patient Choice:   Offered/Gave Vendor List: no (not applicable)       Anticipated Discharge Plan:  Met with patient. Provided education and contact information for Care Coordination services.: yes  Anticipated Discharge Disposition: home without assistance or  services     Transportation Needs (SDOH):  Transportation Concerns: none  Transportation Anticipated: family or friend will provide  Is Out of Hospital DNR needed at discharge?: no    In the past 12 months, has lack of transportation kept you from medical appointments or from getting medications?: No  In the past 12 months, has lack of transportation kept you from meetings, work, or from getting things needed for daily living?: No    Concerns - comments: Patient admitted with Strep infection in right knee. Patient is alert, oriented, independent at home. Patient lives with his spouse, 19 y/o son. He works full time as an .  Patient plans on returning home at d/c.  He currently is on IV antibx, but states that he does not think he will be discharged on these. His pharmacy is [facility].  CC will follow for any d/c needs that may arise.

## 2025-02-02 NOTE — ASSESSMENT & PLAN NOTE
ADA diet, POCT glucose qACHS  SSI coverage (PRN), hold Metformin  Control suboptimal. Insulin regimen adjusted, added basal insulin

## 2025-02-02 NOTE — PROGRESS NOTES
[S]  Patient is POD 1 S/P right knee I&D for native septic arthritis that is growing Grp B strep  Not in acute distress, resting comfortably in bed  Denies fever, chills, nausea, vomiting, chest pain, shortness of breath. Passing gas      [O]  Vitals:    02/02/25 0745   BP: 132/75   Pulse: 86   Resp: 16   Temp: 36.9 °C (98.4 °F)   SpO2: 98%        Labs:  CBC with Diff  WBC   Date Value Ref Range Status   02/02/2025 12.63 (H) 3.80 - 10.50 K/uL Final     RBC   Date Value Ref Range Status   02/02/2025 3.47 (L) 4.50 - 5.80 M/uL Final     HGB   Date Value Ref Range Status   02/02/2025 10.4 (L) 13.7 - 17.5 g/dL Final     HCT   Date Value Ref Range Status   02/02/2025 32.3 (L) 40.1 - 51.0 % Final     MCV   Date Value Ref Range Status   02/02/2025 93.1 83.0 - 98.0 fL Final     MCH   Date Value Ref Range Status   02/02/2025 30.0 28.0 - 33.2 pg Final     MCHC   Date Value Ref Range Status   02/02/2025 32.2 32.2 - 36.5 g/dL Final     RDW   Date Value Ref Range Status   02/02/2025 13.2 11.6 - 14.4 % Final     PLT   Date Value Ref Range Status   02/02/2025 329 150 - 350 K/uL Final     MPV   Date Value Ref Range Status   02/02/2025 10.5 9.4 - 12.4 fL Final     NEUTROABS   Date Value Ref Range Status   01/31/2025 17.59 (H) 1.70 - 7.00 K/uL Final     MONOABS   Date Value Ref Range Status   01/31/2025 0.96 0.30 - 1.00 K/uL Final     EOSABS   Date Value Ref Range Status   01/31/2025 0.01 (L) 0.04 - 0.54 K/uL Final     BASOABS   Date Value Ref Range Status   01/31/2025 0.04 0.01 - 0.10 K/uL Final     NEUTROPHILS   Date Value Ref Range Status   01/31/2025 89.3 % Final     LYMPHOCYTES   Date Value Ref Range Status   01/31/2025 4.8 % Final     MONOCYTES   Date Value Ref Range Status   01/31/2025 4.9 % Final     EOSINOPHILS   Date Value Ref Range Status   01/31/2025 0.1 % Final     BASOPHILS   Date Value Ref Range Status   01/31/2025 0.2 % Final        CMP Results         02/02/25 02/01/25 01/31/25     0544 0411 1754     135 133     K 3.6 3.1 3.7    Cl 97 95 94    CO2 30 30 27    Glucose 216 247 264    BUN 27 28 21    Creatinine 1.1 1.5 1.4    Calcium 9.1 9.5 10.6    Anion Gap 9 10 12    AST -- -- 16    ALT -- -- 10    Albumin -- -- 3.2       3.2    EGFR >60.0 53.0 57.5           Comment for K at 1754 on 01/31/25: Results obtained on plasma. Plasma Potassium values may be up to 0.4 mEQ/L less than serum values. The differences may be greater for patients with high platelet or white cell counts.    Comment for EGFR at 0544 on 02/02/25: Calculation based on the Chronic Kidney Disease Epidemiology Collaboration (CKD-EPI) equation refit without adjustment for race.    Comment for EGFR at 0411 on 02/01/25: Calculation based on the Chronic Kidney Disease Epidemiology Collaboration (CKD-EPI) equation refit without adjustment for race.    Comment for EGFR at 1754 on 01/31/25: Calculation based on the Chronic Kidney Disease Epidemiology Collaboration (CKD-EPI) equation refit without adjustment for race.               RLE:  Dressing clean dry and intact  Sensation intact in superficial peroneal nerve, deep peroneal nerve, saphenous, sural and plantar aspect  Fires tibialis anterior, gastroc-soleus, extensor hallucis longus, flexor hallucis longus  Palpable DP pulse      [A/P]  59 y.o. male  1 Day Post-Op status post Procedure(s):  RIGHT KNEE ARTHROSCOPIC WASH OUT    - Weight bearing: wbat  - DVT prophylaxis: heparin per medicine  - PT/OT  - ABX vanc/cefepime, f/u cultures. Appreciate ID recs. Cultures NGTD  - Pain control  - Bowel Regimen  - ISS  - albumin 3.2. nutrition consult needed  - A1c 9.6 - need endocrinology consult for uncontrolled diabetes  - Dispo pending             Star Osuna MD  Orthopaedic Surgery

## 2025-02-02 NOTE — ASSESSMENT & PLAN NOTE
Cultures from outpatient joint aspiration +Strep  Ortho consulted; patient went to OR 2/1 for R knee washout and cultures sent.  IV antibiotics ortho recommends Vanc/cefepime pending ID recommendations/cultures  ID consult pending  DVT prophylaxis post op; ASA 81mg bid per ortho  Bowel regimen, IS, PT

## 2025-02-02 NOTE — NURSING NOTE
Pt returned from OR with positive pulses and ace wrap over right knee dressing is clean, dry, & intact. Denies numbness/tingling and no discoloration to right leg. Trace edema to RLE. IV antibiotics administered.

## 2025-02-02 NOTE — PLAN OF CARE
Problem: Adult Inpatient Plan of Care  Goal: Plan of Care Review  Outcome: Progressing  Flowsheets (Taken 2/2/2025 1617)  Progress: improving  Outcome Evaluation: OT IE complete  Plan of Care Reviewed With: patient     Problem: Self-Care Deficit  Goal: Improved Ability to Complete Activities of Daily Living  Outcome: Progressing

## 2025-02-02 NOTE — CONSULTS
Infectious Disease Consult Note    Patient Name: Lake Martin  MR#: [id number]  : 1965  Admission Date: 2025  Consult Date: 25 4:46 PM   Consultant: nAtwon Todd MD    Reason for Consult: right knee native joint infection (strep group B)  Referring Provider: hemperly      History of Present Illness     Lake Martin is a 59 y.o. male who was admitted on 2025 for evaluation of atraumatic right knee swelling warmth of 7 days duration.  Patient carries a diagnosis of colonic carcinoma requiring resection and chemotherapy 2 years ago.  Patient's status post colonoscopy 5 days ago.  Patient was observed in out patient orthopedic clinic prior to admission and underwent an arthrocentesis.  Culture of the synovial fluid revealed growth of beta-hemolytic group B Streptococcus.  Yesterday patient underwent a right knee arthroscopic I&D.  Presentation to hospital complicated by new onset atrial fibrillation.  Today patient alert oriented no history of trauma new animal or sick contact.  No history of recurrent articular process.  No antimicrobial therapy prior to admission.  No headache visual changes sore throat recent dental manipulations no chest pain dyspnea cough no nausea vomiting abdominal discomfort no diarrhea or urinary symptoms.  Patient last underwent CT imaging study September of last year.    Outside records were reviewed.    Allergies: No Known Allergies    Medical History:   Past Medical History:   Diagnosis Date    Arthritis     knees    COVID-19 vaccine series completed     pfizer plus booster    Diabetes (CMS/HCC)     Eye injury     Right eye pressure injury    Hypertension     Migraine     hasnt had one in approx 2 yrs -2019    Pulmonary nodules 2021    Type 2 diabetes mellitus (CMS/HCC)        Surgical History:   Past Surgical History   Procedure Laterality Date    Colectomy      Colonoscopy      COLONOSCOPY, HOT SNARE POLYPECTOMY, TATOOING N/A 2021     Performed by Tiarra Martinez MD at  GI    cystoscopy placement rukhsana ureteral catheters Bilateral 12/30/2021    Performed by Nat Payne MD at  OR    LOW ANTERIOR RESECTION LAPAROSCOPIC ROBOTIC Robotic Takedown Splenic Flexure, Primary Colorectal anastimosis EEA # 28 Placement of ADITYA drain and Peritoneal Lavage N/A 12/30/2021    Performed by Tiarra Martinez MD at  OR       Social History:   Social History     Socioeconomic History    Marital status:      Spouse name: Christi     Number of children: 1    Years of education: None    Highest education level: None   Occupational History    Occupation: Electorical consultant    Tobacco Use    Smoking status: Never    Smokeless tobacco: Never   Vaping Use    Vaping status: Never Used   Substance and Sexual Activity    Alcohol use: Not Currently    Drug use: Never    Sexual activity: Defer     Social Drivers of Health     Food Insecurity: No Food Insecurity (1/31/2025)    Hunger Vital Sign     Worried About Running Out of Food in the Last Year: Never true     Ran Out of Food in the Last Year: Never true   Transportation Needs: No Transportation Needs (2/2/2025)    PRAPARE - Transportation     Lack of Transportation (Medical): No     Lack of Transportation (Non-Medical): No   Housing Stability: Low Risk  (2/2/2025)    Housing Stability Vital Sign     Unable to Pay for Housing in the Last Year: No     Number of Times Moved in the Last Year: 0     Homeless in the Last Year: No          Travel Exposure:   Travel and Exposure Screening      Flowsheet Row Most Recent Value   Travel Screening    Overnight hospitalization outside the [place] in the last year? No Filed On: 02/01/2025 1014   Exposure Screening    Symptoms             Animal Exposure: hpi    Other Exposure: hpi    Family History:   Family History   Problem Relation Name Age of Onset    Other Biological Mother          pacemaker    Stroke Biological Father      Diabetes Biological Father      Heart  "failure Biological Father      No Known Problems Biological Sister      Lung cancer Maternal Grandfather          Smoker 50 years     Colon cancer Neg Hx      Rectal cancer Neg Hx         Review of Systems    Pertinent items are noted in HPI.    Medications:    Current IP Meds (From admission, onward)          Frequency     aspirin enteric coated tablet 81 mg         2 times daily     insulin lispro U-100 (HumaLOG) pen 3 Units         3 times daily with meals     insulin glargine U-100 (LANTUS/BASAGLAR) pen 10 Units         Daily     vancomycin 1.25 gram/250 mL IVPB in NSS         Every 8 hours interval     aspirin enteric coated tablet 81 mg  Status:  Discontinued         2 times daily     vancomycin 1.75 gram/500 mL IVPB in NSS         Once     cefTAZidime (FORTAZ) 1 g/100 mL NSS IVPB 1 g         Every 8 hours interval     vancomycin Therapy by Pharmacy Protocol 1 each  (Vancomycin IV therapy by Pharmacy Protocol)         evaluate daily     senna (SENOKOT) tablet 2 tablet         Daily PRN     polyethylene glycol (MIRALAX) 17 gram packet 17 g         Daily PRN     HYDROmorphone (DILAUDID) injection 0.5 mg         Every 4 hours PRN     acetaminophen (TYLENOL) tablet 650 mg         Every 4 hours PRN     oxyCODONE (ROXICODONE) immediate release tablet 5 mg         Every 4 hours PRN     HYDROmorphone (DILAUDID) 0.5 mg/0.5 mL injection  - Pyxis Override Pull        Note to Pharmacy: Created by cabinet override         [Provider Managed Hold]  losartan (COZAAR) tablet 100 mg  (losartan (COZAAR) tablet PLUS hydrochlorothiazide (HCTZ) tablet)        (On hold since yesterday at 0734 until manually unheld; held by Scott Kuo DOHold Reason: Other (see comment)Hold Comments: .)   On hold since yesterday at 0734 until manually unheld (Needs Review)   Hold reason: Other (see comment), Hold comment: .   Placed in \"And\" Linked Group    Daily     [Provider Managed Hold]  hydrochlorothiazide (HYDRODIURIL) tablet 25 mg  " "(losartan (COZAAR) tablet PLUS hydrochlorothiazide (HCTZ) tablet)        (On hold since yesterday at 0734 until manually unheld; held by Scott Kuo DOHold Reason: Other (see comment)Hold Comments: .)   On hold since yesterday at 0734 until manually unheld (Needs Review)   Hold reason: Other (see comment), Hold comment: .   Placed in \"And\" Linked Group    Daily     insulin lispro U-100 (HumaLOG) pen 4-12 Units         3 times daily with meals     lactated ringer's infusion  Status:  Discontinued         Continuous     glucose chewable tablet 16-32 g of dextrose  (Hypoglycemia Treatment Protocol and Hyperglycemia Validation Protocol)        Placed in \"Or\" Linked Group    As needed     dextrose 40 % oral gel 15-30 g of dextrose  (Hypoglycemia Treatment Protocol and Hyperglycemia Validation Protocol)        Placed in \"Or\" Linked Group    As needed     glucagon (GLUCAGEN) injection 1 mg  (Hypoglycemia Treatment Protocol and Hyperglycemia Validation Protocol)        Placed in \"Or\" Linked Group    As needed     dextrose 50 % in water (D50) injection 12.5 g  (Hypoglycemia Treatment Protocol and Hyperglycemia Validation Protocol)        Placed in \"Or\" Linked Group    As needed     dilTIAZem HCl in sodium chloride (CARDIZEM) 125 mg/125 mL (1 mg/mL) infusion  (ED dilTIAZem (CARDIZEM) orders)         Titrated     [Provider Managed Hold]  heparin infusion in D5W 100 units/mL  (heparin infusion High Intensity; Atrial Fibrillation)        (On hold since today at 1340 until manually unheld; held by Scott Kuo DOHold Reason: Other (see comment)Hold Comments: .)   On hold since today at 1340 until manually unheld   Hold reason: Other (see comment), Hold comment: .    Titrated     [Provider Managed Hold]  heparin (porcine) bolus from bag 4,250-8,500 Units  (heparin infusion High Intensity; Atrial Fibrillation)        (On hold since today at 1340 until manually unheld; held by Scott Kuo DOHold Reason: " Other (see comment)Hold Comments: .)   On hold since today at 1340 until manually unheld   Hold reason: Other (see comment), Hold comment: .    Every 6 hours PRN            Anti-infectives (From admission, onward)      Start     Dose/Rate Route Frequency Ordered Stop    02/02/25 0400  vancomycin 1.25 gram/250 mL IVPB in NSS         1,250 mg  166.7 mL/hr over 90 Minutes intravenous Every 8 hours interval 02/01/25 1528      02/01/25 1615  cefTAZidime (FORTAZ) 1 g/100 mL NSS IVPB 1 g         1 g  200 mL/hr over 30 Minutes intravenous Every 8 hours interval 02/01/25 1522      02/01/25 1521  vancomycin Therapy by Pharmacy Protocol 1 each         1 each Other evaluate daily 02/01/25 1522                Objective     Vital Signs:    Patient Vitals for the past 72 hrs:   BP Temp Temp src Pulse Resp SpO2 Height Weight   02/02/25 1508 (!) 156/92 -- -- 83 -- 100 % -- --   02/02/25 1449 126/74 -- -- 79 -- 98 % -- --   02/02/25 1445 129/77 37 °C (98.6 °F) Oral 79 17 99 % -- --   02/02/25 1130 132/76 37.1 °C (98.7 °F) Oral 70 16 97 % -- --   02/02/25 0745 132/75 36.9 °C (98.4 °F) Oral 86 16 98 % -- --   02/02/25 0714 -- -- -- 75 -- -- -- --   02/02/25 0400 126/67 37.2 °C (99 °F) Oral 79 18 100 % -- --   02/01/25 2359 -- -- -- 74 -- -- -- --   02/01/25 2345 134/77 36.8 °C (98.2 °F) Oral 73 18 100 % -- --   02/01/25 1952 122/67 36.3 °C (97.3 °F) Temporal 70 16 97 % -- --   02/01/25 1859 -- -- -- 69 -- -- -- --   02/01/25 1710 (!) 112/58 -- -- 67 -- -- -- --   02/01/25 1544 -- -- -- 69 -- -- -- --   02/01/25 1540 116/62 36.7 °C (98 °F) Temporal 71 16 95 % -- --   02/01/25 1502 116/62 36.4 °C (97.6 °F) Oral 72 14 94 % -- --   02/01/25 1440 125/67 36.6 °C (97.9 °F) Temporal 67 (!) 11 96 % -- --   02/01/25 1430 120/62 -- -- 67 (!) 10 96 % -- --   02/01/25 1420 125/66 -- -- 68 17 93 % -- --   02/01/25 1410 121/64 -- -- 66 12 97 % -- --   02/01/25 1400 131/70 -- -- 69 20 95 % -- --   02/01/25 1350 132/71 -- -- 69 13 95 % -- --   02/01/25  "1340 130/70 -- -- 68 (!) 8 92 % -- --   25 1335 120/67 -- -- 69 -- -- -- --   25 1330 120/67 -- -- 69 17 93 % -- --   25 1325 121/60 36.2 °C (97.2 °F) Temporal 68 16 -- -- --   25 1015 116/61 36.3 °C (97.4 °F) Temporal 72 18 97 % -- --   25 0959 125/63 -- -- 70 -- -- -- --   25 0827 106/60 36.6 °C (97.9 °F) Temporal 74 18 94 % -- --   25 0714 -- -- -- 61 -- -- -- --   25 0431 -- -- -- (!) 117 -- -- -- --   25 0400 128/70 36.5 °C (97.7 °F) Temporal (!) 124 18 97 % -- --   25 0039 -- -- -- (!) 131 -- -- -- --   25 0035 131/71 36.6 °C (97.8 °F) Temporal (!) 125 18 97 % -- 132 kg (290 lb 8 oz)   25 123/87 -- -- (!) 110 -- -- -- --   25 123/87 -- -- (!) 124 16 95 % -- --   25 2154 109/82 -- -- (!) 137 -- -- -- --   25 2044 121/69 37.3 °C (99.1 °F) Temporal 87 16 97 % -- --   25 1734 126/62 37.7 °C (99.9 °F) Oral 78 18 97 % 1.93 m (6' 4\") 134 kg (295 lb)       Temp (72hrs), Av.8 °C (98.2 °F), Min:36.2 °C (97.2 °F), Max:37.7 °C (99.9 °F)      Physical Exam:    Alert  No facial swelling or otorhinorrhea  Oropharynx without exudate or injection  Anicteric sclera conjunctiva clear pupils reactive EOMI  No stridor or meningismus  Lungs: No respiratory distress  Sinus rhythm  Abdomen: Active bowel sounds no distention tenderness guarding pulsation, no suprapubic discomfort  Right lower extremity: Operative site without ascending erythema or tenderness upper thigh, distal neurovascular intact  No acute joint effusions  Neuro: Cranial nerves intact muscle strength 5/5 upper lower      Lines, Drains, Airways, Wounds:  Peripheral IV (Adult) 25 Left Antecubital (Active)   Number of days: 2       Peripheral IV (Adult) 25 Right Antecubital (Active)   Number of days: 2       Surgical Incision Knee Anterior;Right (Active)   Number of days: 1       Labs:    CBC Results         25     0544 0411 " 1754    WBC 12.63 14.97 19.69    RBC 3.47 3.94 4.42    HGB 10.4 11.7 13.2    HCT 32.3 35.1 39.2    MCV 93.1 89.1 88.7    MCH 30.0 29.7 29.9    MCHC 32.2 33.3 33.7     358 463          BMP Results         02/02/25 02/01/25 01/31/25     0544 0411 1754     135 133    K 3.6 3.1 3.7    Cl 97 95 94    CO2 30 30 27    Glucose 216 247 264    BUN 27 28 21    Creatinine 1.1 1.5 1.4    Calcium 9.1 9.5 10.6    Anion Gap 9 10 12    EGFR >60.0 53.0 57.5           Comment for K at 1754 on 01/31/25: Results obtained on plasma. Plasma Potassium values may be up to 0.4 mEQ/L less than serum values. The differences may be greater for patients with high platelet or white cell counts.    Comment for EGFR at 0544 on 02/02/25: Calculation based on the Chronic Kidney Disease Epidemiology Collaboration (CKD-EPI) equation refit without adjustment for race.    Comment for EGFR at 0411 on 02/01/25: Calculation based on the Chronic Kidney Disease Epidemiology Collaboration (CKD-EPI) equation refit without adjustment for race.    Comment for EGFR at 1754 on 01/31/25: Calculation based on the Chronic Kidney Disease Epidemiology Collaboration (CKD-EPI) equation refit without adjustment for race.          PT/PTT Results         02/01/25 01/31/25 01/26/22     0411 2112 0805    PT -- 16.3 13.2    INR -- 1.3 1.0    PTT 31 27 --           Comment for INR at 2112 on 01/31/25: Moderate Intensity Anticoagulation = 2.0 to 3.0, High Intensity = 2.5 to 3.5    Comment for INR at 0805 on 01/26/22: INR has no defined significance when PT is within Reference Range.          UA Results         01/02/22 1915    Color Yellow    Clarity Clear    Glucose Negative    Bilirubin Negative    Ketones +2    Sp Grav 1.010    Blood +2    Ph 6.0    Protein Trace    Urobilinogen 0.2    Nitrite Negative    Leuk Est Trace    WBC 4 TO 10    RBC 0 TO 4    Bacteria None Seen           Comment for Ketones at 1915 on 01/02/22: Free sulfhydryl drugs such as Mesna,  Capoten, and Acetylcysteine (Mucomyst) may cause false positive ketonuria.    Comment for Blood at 1915 on 01/02/22: The sensitivity of the occult blood test is equivalent to approximately 4 intact RBC/HPF.    Comment for Protein at 1915 on 01/02/22: Trace False Positive Protein can be seen with alkaline or highly buffered urines or urine with high specific gravity. Suggest clinical correlation.          Lactate Results         01/31/25 2101    Lactate 1.8            Microbiology Results       Procedure Component Value Units Date/Time    Anaerobic Culture / Smear (includes Aerobic Culture) Knee, Right [[id number]]  (Abnormal) Collected: 02/01/25 1246    Specimen: Tissue from Knee, Right Updated: 02/02/25 1417     Culture **Positive Culture**      3+ Streptococcus, beta hemolytic Group B     Gram Stain Result 4+ WBC      1+ Gram positive cocci    Blood Culture Blood, Venous [[id number]]  (Normal) Collected: 01/31/25 2101    Specimen: Blood, Venous Updated: 02/01/25 2300     Culture No growth at 18-24 hours    Blood Culture Blood, Venous [[id number]]  (Normal) Collected: 01/31/25 2101    Specimen: Blood, Venous Updated: 02/01/25 2300     Culture No growth at 18-24 hours            Pathology Results       ** No results found for the last 720 hours. **            Echo:         Imaging:    Radiology Imaging    XR CHEST 2 VW    Narrative  CLINICAL HISTORY:   cough, wheeze    COMMENT:    VIEWS: two - frontal and lateral.    Comparison date: 1/3/2022.    The heart and mediastinal contours are within    normal limits.  The trachea is midline.  The lungs are clear without discrete  infiltrate.  There are no pleural effusions.  The osseous structures are intact.    Impression  IMPRESSION:  No active disease.      Assessment     Bacterial arthritis: Right knee, beta-hemolytic group B Streptococcus, status post I&D yesterday.  Blood cultures and echocardiogram pending    Recommend repeat contrast-enhanced CT of abdomen and  pelvis.    Diabetes mellitus: Follow response intervention        Plan     Antimicrobial management: Recommend cefazolin pending additional culture analysis.    Discussed clinical presentation with patient.    NOTE: Some or all of the note above was created with the use of dictation software, please note this dictation software can have anomalies in its ability to transcribe. Please contact me directly for anything that seems abnormal for clarification.

## 2025-02-02 NOTE — PROGRESS NOTES
Physical Therapy -  Initial Evaluation     Patient: Lake Martin  Location: [address]  MRN: [id number]  Today's date: 2/2/2025    HISTORY OF PRESENT ILLNESS     Lake is a 59 y.o. male admitted on 1/31/2025 with New onset atrial fibrillation (CMS/HCC) [I48.91]  Pre-op evaluation [Z01.818]  Atrial fibrillation with rapid ventricular response (CMS/HCC) [I48.91]  Staphylococcal arthritis of right knee (CMS/HCC) [M00.061]. Principal problem is Streptococcal arthritis of right knee (CMS/HCC).    Past Medical History  Lake has a past medical history of Arthritis, COVID-19 vaccine series completed, Diabetes (CMS/[facility]), Eye injury, Hypertension, Migraine, Pulmonary nodules (12/16/2021), and Type 2 diabetes mellitus (CMS/[facility]).    History of Present Illness   Pt presents with R knee infection.  Symptoms began 1 week ago with acute onset of R knee swelling without injury.  He was seen at [facility] earlier this week and joint aspiration was performed and the fluid sent for culture. In the time since, the culture has growth Strep. He was referred to the ER for admission.     S/p wash out of R knee, performed by Dr. Osuna on 2/1/25  PRIOR LEVEL OF FUNCTION AND LIVING ENVIRONMENT     Prior Level of Function      Flowsheet Row Most Recent Value   Ambulation assistive equipment   Transferring independent   Toileting independent   Bathing independent   Dressing independent   Eating independent   IADLs independent   Driving/Transportation    Communication understands/communicates without difficulty   Prior Level of Function Comment Pt reports mod I with SPC. Independent with ADLs/IADLs. +Drives.    Assistive Device Currently Used at Home cane, straight             Prior Living Environment      Flowsheet Row Most Recent Value   People in Home spouse, child(beni), adult   Current Living Arrangements home   Home Accessibility not wheelchair accessible   Living Environment Comment  2SH, 1STE, MI, FF to 2nd floor, WIS without DME. Standard height toilet. FFSU available          VITALS AND PAIN     PT Vitals      Date/Time Pulse HR Source Resp SpO2 Pt Activity O2 Therapy BP BP Location BP Method Pt Position Observations [facility]   02/02/25 1445 79 Monitor 17 99 % At rest None (Room air) 129/77 Left upper arm Automatic Lying -- DB   02/02/25 1449 79 -- -- 98 % At rest None (Room air) 126/74 Right upper arm Automatic Sitting -- SG   02/02/25 1508 83 -- -- 100 % At rest None (Room air) 156/92 Left upper arm Automatic Sitting post amb SG          PT Pain      Date/Time Pain Type Side/Orientation Location Rating: Rest Rating: Activity Interventions [facility]   02/02/25 1449 Pain Assessment right knee 2 - mild pain 2 - mild pain position adjusted SG             Objective   OBJECTIVE     Start time:  1444  End time:  1510  Session Length: 26 min  Mode of Treatment: co-treatment  Reason for co-treatment: d/c planning    General Observations  Patient received upright, in chair, leg(s) elevated, heel(s) elevated. He was no issues or concerns identified by nurse prior to session, agreeable to therapy. R knee wrapped    Precautions: fall, weight bearing  Extremity Weight-Bearing Status: right lower extremity  Right LE Weight-Bearing Status: weight-bearing as tolerated (WBAT)     Services  Do You Speak a Language Other Than English at Home?: no  Is an  Needed/Used?: No      PT Eval and Treat - 02/02/25 1444          Cognition    Orientation Status oriented x 4     Affect/Mental Status WFL     Follows Commands WFL     Cognitive Function WFL     Comment, Cognition pleasant, cooperative        Vision Assessment/Intervention    Vision Assessment corrective lenses full-time        Hearing Assessment    Hearing Status WFL        Sensory Assessment    Sensory Assessment sensation intact, lower extremities        Lower Extremity Assessment    LE Assessment ROM and Strength WFL except        Lower  Extremity Range of Motion    Knee, Right (ROM) moderately limited        Bed Mobility    [place] not tested     Comment OOB throughout session        Mobility Belt    Mobility Belt Used During Session yes        Sit/Stand Transfer    Surface chair with arm rests     [place] minimum assist (75% or more patient effort);2 person assist     Safety/Cues verbal cues;hand placement;technique;preparatory posture     Assistive Device walker, front-wheeled     Transfer Comments Significant unsteadiness during STS. Increased time to transition hands to RW. Assist for posterior boost. Assist to slide R foot forward while descending        Gait Training    [place], Gait minimum assist (75% or more patient effort);1 person assist     Safety/Cues increased time to complete;gait deviations;verbal cues;technique     Assistive Device walker, front-wheeled     Distance in Feet 30 feet     Pattern step-to     Deviations/Abnormal Patterns gait speed decreased;antalgic;step length decreased     Comment Assist for balance. Gait mechanics improved with distance. R knee slightly flexed throughout     Advanced Gait Activity curb negotiation        Curb Negotiation    Comment Demonstrated how to navigate a curb with RW        Balance    Static Sitting Balance WFL     Dynamic Sitting Balance mild impairment     Sit to Stand Dynamic Balance moderate impairment     Static Standing Balance mild impairment     Dynamic Standing Balance mild impairment        Impairments/Safety Issues    Impairments Affecting Function balance;endurance/activity tolerance;range of motion (ROM);pain;strength                                              Education Documentation  Assistive/Adaptive Devices, taught by Roger Johnson PT at 2/2/2025  3:38 PM.  Learner: Patient  Readiness: Acceptance  Method: Explanation  Response: Verbalizes Understanding  Comment: PT POC, safety during mobility, WBAT    Risk Factors, taught by Roger Johnson PT at 2/2/2025   3:38 PM.  Learner: Patient  Readiness: Acceptance  Method: Explanation  Response: Verbalizes Understanding  Comment: PT POC, safety during mobility, WBAT    Call Light Use, taught by Roger Johnson PT at 2/2/2025  3:38 PM.  Learner: Patient  Readiness: Acceptance  Method: Explanation  Response: Verbalizes Understanding  Comment: PT POC, safety during mobility, WBAT        Session Outcome  Patient upright, in chair, heel(s) elevated, leg(s) elevated at end of session, all needs met, call light in reach, personal items in reach (Received and left unalarmed, RN aware). Nursing notified about patient's position and patient's performance.    AM-PAC™ - Mobility (Current Function)     Turning form your back to your side while in flat bed without using bedrails 3 - A Little   Moving from lying on your back to sitting on the side of a flat bed without using bedrails 2 - A Lot   Moving to and from a bed to a chair 2 - A Lot   Standing up from a chair using your arms 2 - A Lot   To walk in a hospital room 3 - A Little   Climbing 3-5 steps with a railing 2 - A Lot   AM-PAC™ Mobility Score 14      ASSESSMENT AND PLAN     Progress Summary  [facility] 14. Pt requires min Ax2 for transfers and min Ax1 to ambulate with RW. Pt has significant deficits in his RLE ROM and strength. Pt reports he plans to have a FFSU when he's discharged. Rec d/c home with assist +     Patient/Family Therapy Goals Statement: to get better    PT Plan      Flowsheet Row Most Recent Value   Rehab Potential good, to achieve stated therapy goals at 02/02/2025 1444   Therapy Frequency 4 times/wk at 02/02/2025 1444   Planned Therapy Interventions balance training, bed mobility training, gait training, patient/family education, stair training, strengthening, transfer training, ROM (range of motion) at 02/02/2025 1444            PT Discharge Recommendations      Flowsheet Row Most Recent Value   PT Recommended Discharge Disposition home with home health, home with  assistance at 02/02/2025 1444   Anticipated Equipment Needs if Discharged Home (PT) walker, front-wheeled  at 02/02/2025 1444                 PT Goals      Flowsheet Row Most Recent Value   Bed Mobility Goal 1    Activity/Assistive Device bed mobility activities, all at 02/02/2025 1444   [place] modified independence at 02/02/2025 1444   Time Frame by discharge at 02/02/2025 1444   Progress/Outcome goal ongoing at 02/02/2025 1444   Transfer Goal 1    Activity/Assistive Device all transfers, walker, front-wheeled at 02/02/2025 1444   [place] modified independence at 02/02/2025 1444   Time Frame by discharge at 02/02/2025 1444   Progress/Outcome goal ongoing at 02/02/2025 1444   Gait Training Goal 1    Activity/Assistive Device gait (walking locomotion), walker, front-wheeled at 02/02/2025 1444   [place] modified independence at 02/02/2025 1444   Distance 45 at 02/02/2025 1444   Time Frame by discharge at 02/02/2025 1444   Progress/Outcome goal ongoing at 02/02/2025 1444   Stairs Goal 1    Activity/Assistive Device stairs, all skills, walker, front-wheeled  [curb] at 02/02/2025 1444   [place] supervision required at 02/02/2025 1444   Number of Stairs 1 at 02/02/2025 1444   Time Frame by discharge at 02/02/2025 1444   Progress/Outcome goal ongoing at 02/02/2025 1444

## 2025-02-02 NOTE — PLAN OF CARE
Problem: Adult Inpatient Plan of Care  Goal: Plan of Care Review  Outcome: Progressing  Flowsheets (Taken 2/2/2025 1538)  Progress: improving  Outcome Evaluation: PT Eval complete  Plan of Care Reviewed With: patient  Goal: Patient-Specific Goal (Individualized)  Outcome: Progressing  Flowsheets (Taken 2/2/2025 1538)  Patient/Family-Specific Goals (Include Timeframe): to get better     Problem: Mobility Impairment  Goal: Optimal Mobility  Outcome: Progressing

## 2025-02-02 NOTE — ASSESSMENT & PLAN NOTE
Patient found to be in rapid Afib (HR to 140s) during ER course  No history of Afib, patient asymptomatic  Cardizem gtt + heparin gtt initiated by ER provider  TTE pending  TFT's  Monitor on telemetry, cardiology consulted  Cardiology recommends starting a DOAC when able, when no further procedure anticipated. No need for heparin gtt at this time post op per discussion with cardiology.

## 2025-02-03 ENCOUNTER — APPOINTMENT (INPATIENT)
Dept: RADIOLOGY | Facility: HOSPITAL | Age: 60
DRG: 854 | End: 2025-02-03
Attending: NURSE PRACTITIONER
Payer: COMMERCIAL

## 2025-02-03 LAB
ANION GAP SERPL CALC-SCNC: 7 MEQ/L (ref 3–15)
BUN SERPL-MCNC: 18 MG/DL (ref 7–25)
CALCIUM SERPL-MCNC: 9.2 MG/DL (ref 8.6–10.3)
CHLORIDE SERPL-SCNC: 100 MEQ/L (ref 98–107)
CO2 SERPL-SCNC: 29 MEQ/L (ref 21–31)
CREAT SERPL-MCNC: 0.9 MG/DL (ref 0.7–1.3)
EGFRCR SERPLBLD CKD-EPI 2021: >60 ML/MIN/1.73M*2
ERYTHROCYTE [DISTWIDTH] IN BLOOD BY AUTOMATED COUNT: 13 % (ref 11.6–14.4)
FERRITIN SERPL-MCNC: 755 NG/ML (ref 24–250)
GLUCOSE BLD-MCNC: 251 MG/DL (ref 70–99)
GLUCOSE BLD-MCNC: 279 MG/DL (ref 70–99)
GLUCOSE SERPL-MCNC: 251 MG/DL (ref 70–99)
HCT VFR BLD AUTO: 30 % (ref 40.1–51)
HGB BLD-MCNC: 10 G/DL (ref 13.7–17.5)
IRON SATN MFR SERPL: 12 % (ref 15–45)
IRON SERPL-MCNC: 20 UG/DL (ref 35–150)
MCH RBC QN AUTO: 30.6 PG (ref 28–33.2)
MCHC RBC AUTO-ENTMCNC: 33.3 G/DL (ref 32.2–36.5)
MCV RBC AUTO: 91.7 FL (ref 83–98)
PLATELET # BLD AUTO: 306 K/UL (ref 150–350)
PMV BLD AUTO: 10.2 FL (ref 9.4–12.4)
POCT TEST: ABNORMAL
POCT TEST: ABNORMAL
POTASSIUM SERPL-SCNC: 3.8 MEQ/L (ref 3.5–5.1)
RBC # BLD AUTO: 3.27 M/UL (ref 4.5–5.8)
SODIUM SERPL-SCNC: 136 MEQ/L (ref 136–145)
TIBC SERPL-MCNC: 165 UG/DL (ref 270–460)
UIBC SERPL-MCNC: 145 UG/DL (ref 180–360)
WBC # BLD AUTO: 9.7 K/UL (ref 3.8–10.5)

## 2025-02-03 PROCEDURE — 20600000 HC ROOM AND CARE INTERMEDIATE/TELEMETRY

## 2025-02-03 PROCEDURE — 99233 SBSQ HOSP IP/OBS HIGH 50: CPT | Mod: FS | Performed by: HOSPITALIST

## 2025-02-03 PROCEDURE — 63600000 HC DRUGS/DETAIL CODE: Mod: JZ,TB | Performed by: INTERNAL MEDICINE

## 2025-02-03 PROCEDURE — 36415 COLL VENOUS BLD VENIPUNCTURE: CPT | Performed by: HOSPITALIST

## 2025-02-03 PROCEDURE — 83550 IRON BINDING TEST: CPT | Performed by: NURSE PRACTITIONER

## 2025-02-03 PROCEDURE — 85027 COMPLETE CBC AUTOMATED: CPT | Performed by: HOSPITALIST

## 2025-02-03 PROCEDURE — 25800000 HC PHARMACY IV SOLUTIONS: Performed by: INTERNAL MEDICINE

## 2025-02-03 PROCEDURE — 80048 BASIC METABOLIC PNL TOTAL CA: CPT | Performed by: HOSPITALIST

## 2025-02-03 PROCEDURE — 25000000 HC PHARMACY GENERAL: Performed by: HOSPITALIST

## 2025-02-03 PROCEDURE — 63600105 HC IODINE BASED CONTRAST: Mod: JZ | Performed by: NURSE PRACTITIONER

## 2025-02-03 PROCEDURE — 63600000 HC DRUGS/DETAIL CODE: Mod: JZ,TB | Performed by: HOSPITALIST

## 2025-02-03 PROCEDURE — 82728 ASSAY OF FERRITIN: CPT | Performed by: NURSE PRACTITIONER

## 2025-02-03 PROCEDURE — 63700000 HC SELF-ADMINISTRABLE DRUG: Performed by: INTERNAL MEDICINE

## 2025-02-03 PROCEDURE — 25800000 HC PHARMACY IV SOLUTIONS: Performed by: HOSPITALIST

## 2025-02-03 PROCEDURE — 63600105 HC IODINE BASED CONTRAST: Mod: JZ | Performed by: HOSPITALIST

## 2025-02-03 PROCEDURE — 63700000 HC SELF-ADMINISTRABLE DRUG: Performed by: HOSPITALIST

## 2025-02-03 PROCEDURE — 74177 CT ABD & PELVIS W/CONTRAST: CPT

## 2025-02-03 RX ORDER — LOPERAMIDE HYDROCHLORIDE 2 MG/1
2 CAPSULE ORAL 4 TIMES DAILY PRN
COMMUNITY

## 2025-02-03 RX ORDER — SENNOSIDES 8.6 MG/1
2 TABLET ORAL DAILY
Status: DISCONTINUED | OUTPATIENT
Start: 2025-02-04 | End: 2025-02-05 | Stop reason: HOSPADM

## 2025-02-03 RX ORDER — SENNOSIDES 8.6 MG/1
2 TABLET ORAL NIGHTLY
Status: DISCONTINUED | OUTPATIENT
Start: 2025-02-03 | End: 2025-02-03

## 2025-02-03 RX ORDER — AMOXICILLIN 250 MG
2 CAPSULE ORAL DAILY
COMMUNITY

## 2025-02-03 RX ORDER — METFORMIN HYDROCHLORIDE 1000 MG/1
1000 TABLET ORAL 2 TIMES DAILY WITH MEALS
COMMUNITY
End: 2025-02-24

## 2025-02-03 RX ORDER — INSULIN LISPRO 100 [IU]/ML
10 INJECTION, SOLUTION INTRAVENOUS; SUBCUTANEOUS
Status: DISCONTINUED | OUTPATIENT
Start: 2025-02-04 | End: 2025-02-04

## 2025-02-03 RX ORDER — AMLODIPINE BESYLATE 5 MG/1
5 TABLET ORAL DAILY
COMMUNITY
End: 2025-02-05 | Stop reason: HOSPADM

## 2025-02-03 RX ORDER — LOSARTAN POTASSIUM AND HYDROCHLOROTHIAZIDE 12.5; 5 MG/1; MG/1
2 TABLET ORAL DAILY
COMMUNITY

## 2025-02-03 RX ORDER — INSULIN GLARGINE 100 [IU]/ML
20 INJECTION, SOLUTION SUBCUTANEOUS DAILY
Status: DISCONTINUED | OUTPATIENT
Start: 2025-02-04 | End: 2025-02-04

## 2025-02-03 RX ORDER — INSULIN GLARGINE 100 [IU]/ML
17 INJECTION, SOLUTION SUBCUTANEOUS DAILY
Status: DISCONTINUED | OUTPATIENT
Start: 2025-02-04 | End: 2025-02-03

## 2025-02-03 RX ORDER — INSULIN LISPRO 100 [IU]/ML
0-16 INJECTION, SOLUTION INTRAVENOUS; SUBCUTANEOUS
Status: DISCONTINUED | OUTPATIENT
Start: 2025-02-03 | End: 2025-02-05 | Stop reason: HOSPADM

## 2025-02-03 RX ORDER — IOPAMIDOL 755 MG/ML
100 INJECTION, SOLUTION INTRAVASCULAR
Status: COMPLETED | OUTPATIENT
Start: 2025-02-03 | End: 2025-02-03

## 2025-02-03 RX ORDER — INSULIN LISPRO 100 [IU]/ML
5 INJECTION, SOLUTION INTRAVENOUS; SUBCUTANEOUS
Status: DISCONTINUED | OUTPATIENT
Start: 2025-02-03 | End: 2025-02-03

## 2025-02-03 RX ORDER — ASPIRIN/ACETAMINOPHEN/CAFFEINE 250-250-65
1 TABLET ORAL DAILY
COMMUNITY

## 2025-02-03 RX ORDER — POLYETHYLENE GLYCOL 3350 17 G/17G
17 POWDER, FOR SOLUTION ORAL DAILY
Status: DISCONTINUED | OUTPATIENT
Start: 2025-02-03 | End: 2025-02-05 | Stop reason: HOSPADM

## 2025-02-03 RX ORDER — CHOLECALCIFEROL (VITAMIN D3) 125 MCG
440 CAPSULE ORAL DAILY PRN
COMMUNITY
End: 2025-02-05 | Stop reason: HOSPADM

## 2025-02-03 RX ORDER — SODIUM CHLORIDE 9 MG/ML
INJECTION, SOLUTION INTRAVENOUS CONTINUOUS
Status: ACTIVE | OUTPATIENT
Start: 2025-02-03 | End: 2025-02-04

## 2025-02-03 RX ORDER — DILTIAZEM HYDROCHLORIDE 120 MG/1
120 CAPSULE, COATED, EXTENDED RELEASE ORAL DAILY
Status: DISCONTINUED | OUTPATIENT
Start: 2025-02-03 | End: 2025-02-05 | Stop reason: HOSPADM

## 2025-02-03 RX ADMIN — INSULIN LISPRO 3 UNITS: 100 INJECTION, SOLUTION INTRAVENOUS; SUBCUTANEOUS at 12:27

## 2025-02-03 RX ADMIN — ASPIRIN 81 MG: 81 TABLET, COATED ORAL at 08:35

## 2025-02-03 RX ADMIN — INSULIN LISPRO 8 UNITS: 100 INJECTION, SOLUTION INTRAVENOUS; SUBCUTANEOUS at 12:27

## 2025-02-03 RX ADMIN — CEFAZOLIN 2 G: 2 INJECTION, POWDER, FOR SOLUTION INTRAMUSCULAR; INTRAVENOUS at 17:42

## 2025-02-03 RX ADMIN — DILTIAZEM HYDROCHLORIDE 120 MG: 120 CAPSULE, EXTENDED RELEASE ORAL at 15:47

## 2025-02-03 RX ADMIN — SODIUM CHLORIDE: 9 INJECTION, SOLUTION INTRAVENOUS at 17:42

## 2025-02-03 RX ADMIN — IOHEXOL 1000 ML: 300 INJECTION, SOLUTION INTRAVENOUS at 13:38

## 2025-02-03 RX ADMIN — CEFAZOLIN 2 G: 2 INJECTION, POWDER, FOR SOLUTION INTRAMUSCULAR; INTRAVENOUS at 08:36

## 2025-02-03 RX ADMIN — CEFAZOLIN 2 G: 2 INJECTION, POWDER, FOR SOLUTION INTRAMUSCULAR; INTRAVENOUS at 01:41

## 2025-02-03 RX ADMIN — HYDROMORPHONE HYDROCHLORIDE 0.5 MG: 1 INJECTION, SOLUTION INTRAMUSCULAR; INTRAVENOUS; SUBCUTANEOUS at 08:37

## 2025-02-03 RX ADMIN — HYDROMORPHONE HYDROCHLORIDE 0.5 MG: 1 INJECTION, SOLUTION INTRAMUSCULAR; INTRAVENOUS; SUBCUTANEOUS at 03:13

## 2025-02-03 RX ADMIN — INSULIN LISPRO 10 UNITS: 100 INJECTION, SOLUTION INTRAVENOUS; SUBCUTANEOUS at 21:24

## 2025-02-03 RX ADMIN — ASPIRIN 81 MG: 81 TABLET, COATED ORAL at 19:56

## 2025-02-03 RX ADMIN — HYDROMORPHONE HYDROCHLORIDE 0.5 MG: 1 INJECTION, SOLUTION INTRAMUSCULAR; INTRAVENOUS; SUBCUTANEOUS at 15:47

## 2025-02-03 RX ADMIN — IOPAMIDOL 100 ML: 755 INJECTION, SOLUTION INTRAVENOUS at 16:36

## 2025-02-03 RX ADMIN — INSULIN LISPRO 3 UNITS: 100 INJECTION, SOLUTION INTRAVENOUS; SUBCUTANEOUS at 08:37

## 2025-02-03 ASSESSMENT — COGNITIVE AND FUNCTIONAL STATUS - GENERAL
STANDING UP FROM CHAIR USING ARMS: 2 - A LOT
MOVING TO AND FROM BED TO CHAIR: 3 - A LITTLE
STANDING UP FROM CHAIR USING ARMS: 2 - A LOT
CLIMB 3 TO 5 STEPS WITH RAILING: 2 - A LOT
MOVING TO AND FROM BED TO CHAIR: 2 - A LOT
WALKING IN HOSPITAL ROOM: 2 - A LOT
WALKING IN HOSPITAL ROOM: 2 - A LOT
CLIMB 3 TO 5 STEPS WITH RAILING: 2 - A LOT

## 2025-02-03 NOTE — PROGRESS NOTES
[S]  Patient is POD 2 S/P right knee I&D for native septic arthritis that is growing Grp B strep  Not in acute distress, resting comfortably in bed  Denies fever, chills, nausea, vomiting, chest pain, shortness of breath. Passing gas and having bowel movement      [O]  Vitals:    02/03/25 0831   BP: (!) 167/85   Pulse: 76   Resp: 18   Temp: 36.8 °C (98.3 °F)   SpO2: 97%        Labs:  CBC with Diff  WBC   Date Value Ref Range Status   02/03/2025 9.70 3.80 - 10.50 K/uL Final     RBC   Date Value Ref Range Status   02/03/2025 3.27 (L) 4.50 - 5.80 M/uL Final     HGB   Date Value Ref Range Status   02/03/2025 10.0 (L) 13.7 - 17.5 g/dL Final     HCT   Date Value Ref Range Status   02/03/2025 30.0 (L) 40.1 - 51.0 % Final     MCV   Date Value Ref Range Status   02/03/2025 91.7 83.0 - 98.0 fL Final     MCH   Date Value Ref Range Status   02/03/2025 30.6 28.0 - 33.2 pg Final     MCHC   Date Value Ref Range Status   02/03/2025 33.3 32.2 - 36.5 g/dL Final     RDW   Date Value Ref Range Status   02/03/2025 13.0 11.6 - 14.4 % Final     PLT   Date Value Ref Range Status   02/03/2025 306 150 - 350 K/uL Final     MPV   Date Value Ref Range Status   02/03/2025 10.2 9.4 - 12.4 fL Final     NEUTROABS   Date Value Ref Range Status   01/31/2025 17.59 (H) 1.70 - 7.00 K/uL Final     MONOABS   Date Value Ref Range Status   01/31/2025 0.96 0.30 - 1.00 K/uL Final     EOSABS   Date Value Ref Range Status   01/31/2025 0.01 (L) 0.04 - 0.54 K/uL Final     BASOABS   Date Value Ref Range Status   01/31/2025 0.04 0.01 - 0.10 K/uL Final     NEUTROPHILS   Date Value Ref Range Status   01/31/2025 89.3 % Final     LYMPHOCYTES   Date Value Ref Range Status   01/31/2025 4.8 % Final     MONOCYTES   Date Value Ref Range Status   01/31/2025 4.9 % Final     EOSINOPHILS   Date Value Ref Range Status   01/31/2025 0.1 % Final     BASOPHILS   Date Value Ref Range Status   01/31/2025 0.2 % Final        CMP Results         02/02/25 02/01/25 01/31/25     0544 0411  1754     135 133    K 3.6 3.1 3.7    Cl 97 95 94    CO2 30 30 27    Glucose 216 247 264    BUN 27 28 21    Creatinine 1.1 1.5 1.4    Calcium 9.1 9.5 10.6    Anion Gap 9 10 12    AST -- -- 16    ALT -- -- 10    Albumin -- -- 3.2       3.2    EGFR >60.0 53.0 57.5           Comment for K at 1754 on 01/31/25: Results obtained on plasma. Plasma Potassium values may be up to 0.4 mEQ/L less than serum values. The differences may be greater for patients with high platelet or white cell counts.    Comment for EGFR at 0544 on 02/02/25: Calculation based on the Chronic Kidney Disease Epidemiology Collaboration (CKD-EPI) equation refit without adjustment for race.    Comment for EGFR at 0411 on 02/01/25: Calculation based on the Chronic Kidney Disease Epidemiology Collaboration (CKD-EPI) equation refit without adjustment for race.    Comment for EGFR at 1754 on 01/31/25: Calculation based on the Chronic Kidney Disease Epidemiology Collaboration (CKD-EPI) equation refit without adjustment for race.               RLE:  Dressing clean dry and intact  Sensation intact in superficial peroneal nerve, deep peroneal nerve, saphenous, sural and plantar aspect  Fires tibialis anterior, gastroc-soleus, extensor hallucis longus, flexor hallucis longus  Palpable DP pulse      [A/P]  59 y.o. male  2 Days Post-Op status post Procedure(s):  RIGHT KNEE ARTHROSCOPIC WASH OUT    - Weight bearing: wbat  - DVT prophylaxis: heparin per medicine  - PT/OT  - ABX cefazolin per ID. Cultures growing beta hemolytic group B strep. CT abd per ID  - Pain control  - Bowel Regimen  - ISS  - albumin 3.2. nutrition consult needed  - A1c 9.6 - need endocrinology consult for uncontrolled diabetes  - Dispo pending             Star Osuna MD  Orthopaedic Surgery

## 2025-02-03 NOTE — ASSESSMENT & PLAN NOTE
With atrial fibrillation and atrial flutter. Episodes last 5-10 minutes and then in sinus. Continue Cardizem gtt. Acceptable risk for OR if needed. CHADS2-VASC of 2 and recommend DOAC when no surgical procedures are needed. Can do echocardiogram inpatient or outpatient. Replete K.     2/2-in sinus now.It was likely a stress response to infection. Would recommend DOAC when able. Can use lose dose Cardizem 120mg daily.

## 2025-02-03 NOTE — PROGRESS NOTES
Cardiology Daily Progress Note    SUBJECTIVE      Lake Dandy Martin is a 59 y.o. male who was admitted for New onset atrial fibrillation (CMS/HCC) [I48.91]  Pre-op evaluation [Z01.818]  Atrial fibrillation with rapid ventricular response (CMS/HCC) [I48.91]  Staphylococcal arthritis of right knee (CMS/HCC) [M00.061].    INTERVAL HISTORY:  Patient seen and examined. No further atrial fibrillation     MEDICATIONS     CURRENT IP MEDS:  • aspirin  81 mg oral BID   • ceFAZolin  2 g intravenous q8h INT   • [Provider Managed Hold] losartan  100 mg oral Daily    And   • [Provider Managed Hold] hydrochlorothiazide  25 mg oral Daily   • insulin glargine U-100  10 Units subcutaneous Daily   • insulin lispro U-100  3 Units subcutaneous TID with meals   • insulin lispro U-100  4-12 Units subcutaneous TID with meals   • iohexoL  1,000 mL oral Once   • polyethylene glycol  17 g oral Daily   • senna  2 tablet oral Nightly     • [Provider Managed Hold] heparin infusion - MAR calculator by PTT  100-4,000 Units/hr Stopped (02/01/25 0601)       OBJECTIVE     Vital signs in last 24 hours:  Temp:  [36.4 °C (97.6 °F)-37.2 °C (98.9 °F)] 36.8 °C (98.3 °F)  Heart Rate:  [72-83] 72  Resp:  [17-18] 18  BP: (126-167)/(74-93) 157/93      Intake/Output Summary (Last 24 hours) at 2/3/2025 1243  Last data filed at 2/3/2025 0833  Gross per 24 hour   Intake 10 ml   Output 300 ml   Net -290 ml       WEIGHTS  Wt Readings from Last 3 Encounters:   02/01/25 132 kg (290 lb 8 oz)   12/06/24 (!) 140 kg (307 lb 12.8 oz)   09/13/24 (!) 140 kg (308 lb)       PHYSICAL EXAM:  Physical Exam  Constitutional:       Appearance: Normal appearance.   HENT:      Head: Normocephalic and atraumatic.      Nose: Nose normal.      Mouth/Throat:      Mouth: Mucous membranes are dry.   Eyes:      Conjunctiva/sclera: Conjunctivae normal.   Cardiovascular:      Rate and Rhythm: Normal rate and regular rhythm.      Pulses: Normal pulses.      Heart sounds: Normal heart  sounds, S1 normal and S2 normal.   Pulmonary:      Effort: Pulmonary effort is normal.      Breath sounds: Normal breath sounds.   Abdominal:      Palpations: Abdomen is soft.   Musculoskeletal:      Cervical back: Neck supple.      Right lower leg: No edema.      Left lower leg: No edema.      Comments: Right knee wrapped    Skin:     General: Skin is warm and dry.   Neurological:      General: No focal deficit present.      Mental Status: He is alert and oriented to person, place, and time.   Psychiatric:         Attention and Perception: Attention and perception normal.         Mood and Affect: Mood normal.         LABS / IMAGING / EKG / TELEMETRY     LABS:   Results from last 7 days   Lab Units 02/03/25  0559 02/02/25  0544 02/01/25  0411   SODIUM mEQ/L 136 136 135*   POTASSIUM mEQ/L 3.8 3.6 3.1*   CHLORIDE mEQ/L 100 97* 95*   CO2 mEQ/L 29 30 30   BUN mg/dL 18 27* 28*   CREATININE mg/dL 0.9 1.1 1.5*   GLUCOSE mg/dL 251* 216* 247*   CALCIUM mg/dL 9.2 9.1 9.5       Results from last 7 days   Lab Units 01/31/25  1754   AST IU/L 16     Results from last 7 days   Lab Units 01/31/25  1754   ALT IU/L 10     Results from last 7 days   Lab Units 02/03/25  0559 02/02/25  0544 02/01/25  0411   WBC K/uL 9.70 12.63* 14.97*   HEMOGLOBIN g/dL 10.0* 10.4* 11.7*   HEMATOCRIT % 30.0* 32.3* 35.1*   PLATELETS K/uL 306 329 358*       Results from last 7 days   Lab Units 02/01/25  0411 01/31/25  2152   HIGH SENSITIVE TROPONIN I pg/mL 12.9 15.6*     PT/PTT Results         02/01/25 01/31/25 01/26/22     0411 2112 0805    PT -- 16.3 13.2    INR -- 1.3 1.0    PTT 31 27 --           Comment for INR at 2112 on 01/31/25: Moderate Intensity Anticoagulation = 2.0 to 3.0, High Intensity = 2.5 to 3.5    Comment for INR at 0805 on 01/26/22: INR has no defined significance when PT is within Reference Range.          Lab Results   Component Value Date    CHOL 217 (H) 08/02/2023    TRIG 172 (H) 08/02/2023    HDL 53 08/02/2023    LDLCALC 133 (H)  08/02/2023    TSH 0.77 02/01/2025    HGBA1C 9.6 (H) 02/01/2025       Imaging  No results found.      ECG   No new ECG.    TELEMETRY sinus rhythm       ASSESSMENT AND PLAN     Assessment & Plan  Streptococcal arthritis of right knee (CMS/HCC)  Per orthopedics. S/p I and D in the OR  Diabetes mellitus (CMS/HCC)  Per primary team   Primary hypertension  Continue home medications. Replete K.   New onset atrial fibrillation (CMS/HCC)  With atrial fibrillation and atrial flutter. Episodes last 5-10 minutes and then in sinus. Continue Cardizem gtt. Acceptable risk for OR if needed. CHADS2-VASC of 2 and recommend DOAC when no surgical procedures are needed. Can do echocardiogram inpatient or outpatient. Replete K.     2/2-in sinus now.It was likely a stress response to infection. Would recommend DOAC when able. Can use lose dose Cardizem 120mg daily.   Hypokalemia  Replete     \    Scott Soliz,   2/3/2025

## 2025-02-03 NOTE — PROGRESS NOTES
"Lakepratibha Martin   [id number]    Your doctor has referred you for a   that requires the injection of an iodinated contrast material into your bloodstream. This iodinated contrast material, sometimes referred to as \"x-ray dye\" allows for better interpretation of the x-ray films or CT images and results in a more accurate interpretation of the examination.     Without the use of iodinated contrast (x-ray dye), the examination may be less informative and result in a suboptimal examination, and possibly a delay in diagnosis and, if needed, treatment.     The iodinated contrast material is given through a small needle or catheter placed into a vein, usually on the inside of the elbow, on the back of hand, or in a vein in the foot or lower leg.    The most common, though still rare, potential reaction to an intravenous contrast injection is an allergic-like reaction. Most allergic-like reactions are mild, though a small subset of people can have more severe reactions. Mild reactions include mild / scattered hives, itching, scratchy throat, sneezing and nasal congestion. More severe reactions include facial swelling, severe difficulty breathing, wheezing and anaphylactic shock. Those with a prior history allergic-like reaction to the same class of contrast media (such as CT contrast or MRI contrast) are at the highest risk for an allergic reaction.     If you believe you had an allergic reaction to contrast in the past, please let our staff know. We can determine if this increases your risk for a future reaction and provide steps to decrease the risk. This may delay your examination, but it decreases the risk of having a new and possibly more severe reaction to the contrast injection.    People with a history of prior allergic reactions to other substances (such as unrelated medications and food) and patients with a history of asthma have slightly increased risk for an allergic reaction from contrast material when " compared with the general population, but do not require to be pretreated prior to a contrast injection.    You should notify the physician, nurse or technologist if you have ever had any of these conditions or if you have any questions.

## 2025-02-03 NOTE — CONSULTS
REPORT TYPE: Consult Note    DATE OF CONSULTATION: 02/03/2025    ENDOCRINOLOGY CONSULTATION    HISTORY OF PRESENT ILLNESS:  Mr. Martin is a 59-year-old male with a 3-year history of type 2 diabetes mellitus.  At home, the patient uses METFORMIN 1000 mg twice daily.  His comorbidities include hypertension.  He was in his usual state of health until   he developed pain and swelling in his right knee.  He presented to orthopedic surgery and had his joint aspirated and was admitted for treatment of presumed septic arthritis of his right knee.  Cultures as an outpatient are positive for the   Streptococcus.  The patient has had an episode of rapid atrial fibrillation.  His TSH is normal.  He denies target organ damage from diabetes mellitus.  His present glucose is 251.  He received 10 units of LANTUS this morning along with 3 units of LISPRO   with meals.  The doses have been raised for tomorrow.  His present laboratory studies include sodium 136, potassium 3.8, chloride 100, CO2 of 29, creatinine 0.9, BUN of 18, calcium 9.2. Hemoglobin 10.0, hematocrit 30.0, white count 9.7.    PAST MEDICAL HISTORY, SOCIAL HISTORY, FAMILY HISTORY, REVIEW OF SYSTEMS, MEDICATIONS AND ALLERGIES:  See H and P.    PHYSICAL EXAMINATION:  VITAL SIGNS:  BMI is 35.36.  Temperature is 98.3, pulse 75, blood pressure 148/81.  GENERAL:  The patient appears to be in no acute distress.  SKIN:  Without rashes or lesions.  HEENT:  Without inflammation.  He is anicteric.  Teeth are in adequate repair.  NECK:  Thyroid is not visible.  HEART:  Regular by monitor.  LUNGS:  He is not tachypneic at rest.  ABDOMEN:  Not distended.  EXTREMITIES:  His left lower extremity appears normal.  His right knee is wrapped.  NEUROLOGIC:  Awake, alert, oriented.  No focal neurologic deficits.    IMPRESSION:  Type 2 diabetes mellitus exacerbated by metabolic stress, infected right knee, history of hypertension and paroxysmal atrial fibrillation.    RECOMMENDATIONS:  I have  adjusted the patient's insulin and will monitor his blood glucoses.      Pop Lemon MD    DD: 02/03/2025 17:24  DT: 02/03/2025 18:05  Voice ID: [id number]/Report ID: [id number]  am

## 2025-02-03 NOTE — PROGRESS NOTES
Division of Hospital Medicine -  Daily Progress Note       SUBJECTIVE   Interval History: Resting in bed. No acute complaints. Discussed CT a/p with pt - agreeable to study. CM following up on price check for Eliquis. Discussed pt with ortho - ortho STELLA to discuss w/ attending if pt is stable for DOAC.     Last Bowel Movement: 01/31/25     Offered to call family to provide medical update today. Pt declined.       OBJECTIVE      Vital signs in last 24 hours:  Temp:  [36.4 °C (97.6 °F)-37.2 °C (98.9 °F)] 36.8 °C (98.3 °F)  Heart Rate:  [72-83] 72  Resp:  [17-18] 18  BP: (126-167)/(74-93) 157/93    Intake/Output Summary (Last 24 hours) at 2/3/2025 1422  Last data filed at 2/3/2025 0833  Gross per 24 hour   Intake 10 ml   Output 300 ml   Net -290 ml       PHYSICAL EXAMINATION        General: NAD   Skin: warm  HEENT: Normocephalic, atraumatic  Cardiovascular: RRR  Pulmonary: no wheezing, on room air  Abdomen: soft  Extremities: no pitting edema of b/l LE, R knee is wrapped in ACE dressing  Musculoskeletal: no deformities  Neuro: alert and awake, oriented x3  Psychiatric: no agitation      LINES, CATHETERS, DRAINS, AIRWAYS, AND WOUNDS   Lines, Drains, and Airways:  Wounds (agree with documentation and present on admission):  Peripheral IV (Adult) 02/02/25 Distal;Posterior;Right Forearm (Active)   Number of days: 1       Surgical Incision Knee Anterior;Right (Active)   Number of days: 2       Comments: reviewed     LABS / IMAGING / TELE      Labs: reviewed    Lab Results   Component Value Date    GLUCOSE 251 (H) 02/03/2025    CALCIUM 9.2 02/03/2025     02/03/2025    K 3.8 02/03/2025    CO2 29 02/03/2025     02/03/2025    BUN 18 02/03/2025    CREATININE 0.9 02/03/2025     Lab Results   Component Value Date    WBC 9.70 02/03/2025    HGB 10.0 (L) 02/03/2025    HCT 30.0 (L) 02/03/2025    MCV 91.7 02/03/2025     02/03/2025       Imaging: reviewed    ECG 12 lead   ED Interpretation   A-fib 148  Nonspecific  ST-T wave abnormality      Final Result      CT ABDOMEN PELVIS WITH IV CONTRAST    (Results Pending)        Microbiology Data: reviewed  Microbiology Results       Procedure Component Value Units Date/Time    Anaerobic Culture / Smear (includes Aerobic Culture) Knee, Right [[id number]]  (Abnormal) Collected: 02/01/25 1246    Specimen: Tissue from Knee, Right Updated: 02/03/25 1157     Culture **Positive Culture**      3+ Streptococcus, beta hemolytic Group B     Gram Stain Result 4+ WBC      1+ Gram positive cocci    Blood Culture Blood, Venous [[id number]]  (Normal) Collected: 01/31/25 2101    Specimen: Blood, Venous Updated: 02/02/25 2300     Culture No growth at 48 hours    Blood Culture Blood, Venous [[id number]]  (Normal) Collected: 01/31/25 2101    Specimen: Blood, Venous Updated: 02/02/25 2300     Culture No growth at 48 hours             ECG/Telemetry: reviewed    Continue telemetry    ASSESSMENT AND PLAN        # Streptococcal arthritis of right knee (CMS/HCC)  - s/p OR 2/1 for R knee washout  - 2/1/25 OR cx +group B strep     - ortho following  - ID following - on ancef  - f/u 1/31/25 blood cx - ngtd  - f/u CT a/p w/ po and iv contrast   - WBAT, post op PT rec home with home health, home with assistance  - dvt ppx - on asa bid  - pain control - tylenol prn, dilaudid iv prn, oxy prn  - bowel regimen - senokot prn, miralax prn  - IS     # Sepsis with acute organ dysfunction  - SIRS: HR: 124 (2/1/25 4:00), WBC: 15.0 (2/1/25)  - white blood cell count: 9.7 (2/3/25) down from 12.6 (2/2/25), high 19.7 (1/31/25)  - SOFA score: 2 (2/3/25)  - T: 36.8 ºC / HR: 72 / RR: 18 (2/3/25 12:02), T high: 37.7 ºC (1/31/25 17:34)  - abx reviewed elsewhere     # Paroxysmal atrial fibrillation  - heart rate range: 72-83 (since 2/2/25 14:45)  - [id number] score: 2 (2/3/25), moderate-high risk of stroke (2.2% per year)  - new onset AF, was likely a stress response to infection  - TSH wnl  - s/p diltizem gtt     - sinus on tele  - per  prior discussion w/ cardiology, ok to hold off on resuming heparin gtt post-op, start DOAC when able  - start: diltiazem 120 mg po daily (due 02/03/25)  - tele monitoring  - 2/3/25 - reached out to ortho to discuss if stable for DOAC - ortho STELLA to speak w/ attending  - CM price checking eliquis  - if ok for DOAC per ortho and pt ok with copay, will start eliquis     # Diabetes Mellitus, Type 2: uncontrolled  - hemoglobin A1c: 9.6 % (2/1/25) down from 10.0 % (8/2/23)  - glucose range: 221-251 (since 2/2/25)  - currently on: insulin glargine 10 units sc daily  - currently on: insulin lispro 3 units sc TID  - currently on: insulin lispro 4 - 12 units sc TID  - holding home: metFORMIN tablet po  - added diabetic diet  - endo consult  - nutrition following     # Anemia: acute on chronic, stable, normocytic  - est. baseline hemoglobin: 11.6 g/dL  - hemoglobin: 10 (2/3/25) down from 10.4 (2/2/25)  - pt is post-op  - check iron panel  - trend cbc     # Acute kidney injury  - creatinine: 0.9 (2/3/25) down from 1.1 (2/2/25), high 1.5 (2/1/25)  - est Cr baseline: 0.91 mg/dL  - holding home: hydrochlorothiazide-losartan 12.5 mg-50 mg  - trend bmp  - renally dose meds as indicated  - avoid nephrotoxic agents     # Hypertension  - 24hr BP range: (126-167) / (74-93) (2/3/25)  - holding home: amlodipine 5 mg po, hydrochlorothiazide-losartan 12.5 mg-50 mg  - start: diltiazem 120 mg po daily (due 02/03/25)  - cardiology following  - added cardiac diet    VTE Assessment: Padua VTE Score: 2  VTE Prophylaxis:  Current anticoagulants:  [Provider Managed Hold] heparin (porcine) bolus from bag 4,250-8,500 Units, intravenous, q6h PRN  [Provider Managed Hold] heparin infusion in D5W 100 units/mL, intravenous, Titrated        Code Status: Full Code  Palliative Care Screening Score: 0   Estimated Discharge Date: 2/6/2025     Disposition Planning: clinically active        CARLA Broderick  2/3/2025

## 2025-02-03 NOTE — PROGRESS NOTES
Infectious Disease Progress Note    Patient Name: Lake Martin  MR#: [id number]  : 1965  Admission Date: 2025  Date: 25   Time: 2:02 PM   Author: CARLA Saldana    Major Events:     Antibiotics:    Anti-infectives (From admission, onward)      Start     Dose/Rate Route Frequency Ordered Stop    25 1745  ceFAZolin (ANCEF) 2 g/100 mL NSS         2 g  200 mL/hr over 30 Minutes intravenous Every 8 hours interval 25 1654              Subjective   Patient states he is feeling good and hoping to be able to take oral antibiotics on discharge, tolerating diet    Review of Systems    Pertinent items are noted in HPI.    Objective     Vital Signs:    Patient Vitals for the past 72 hrs:   BP Temp Temp src Pulse Resp SpO2 Height Weight   25 1202 (!) 157/93 36.8 °C (98.3 °F) Oral 72 18 97 % -- --   25 0831 (!) 167/85 36.8 °C (98.3 °F) Oral 76 18 97 % -- --   25 0659 -- -- -- 75 -- -- -- --   25 0551 (!) 142/81 37.2 °C (98.9 °F) Temporal 78 17 95 % -- --   25 (!) 145/79 37 °C (98.6 °F) Temporal 80 17 96 % -- --   25 -- -- -- 78 -- -- -- --   25 (!) 146/75 36.4 °C (97.6 °F) Temporal 79 17 94 % -- --   25 -- -- -- 76 -- -- -- --   25 1515 -- -- -- 76 -- -- -- --   25 1508 (!) 156/92 -- -- 83 -- 100 % -- --   25 1449 126/74 -- -- 79 -- 98 % -- --   25 1445 129/77 37 °C (98.6 °F) Oral 79 17 99 % -- --   25 1130 132/76 37.1 °C (98.7 °F) Oral 70 16 97 % -- --   25 0745 132/75 36.9 °C (98.4 °F) Oral 86 16 98 % -- --   25 0714 -- -- -- 75 -- -- -- --   25 0400 126/67 37.2 °C (99 °F) Oral 79 18 100 % -- --   25 2359 -- -- -- 74 -- -- -- --   25 2345 134/77 36.8 °C (98.2 °F) Oral 73 18 100 % -- --   25 1952 122/67 36.3 °C (97.3 °F) Temporal 70 16 97 % -- --   25 1859 -- -- -- 69 -- -- -- --   25 1710 (!) 112/58 -- -- 67 -- -- -- --   25 1544  "-- -- -- 69 -- -- -- --   25 1540 116/62 36.7 °C (98 °F) Temporal 71 16 95 % -- --   25 1502 116/62 36.4 °C (97.6 °F) Oral 72 14 94 % -- --   25 1440 125/67 36.6 °C (97.9 °F) Temporal 67 (!) 11 96 % -- --   25 1430 120/62 -- -- 67 (!) 10 96 % -- --   25 1420 125/66 -- -- 68 17 93 % -- --   25 1410 121/64 -- -- 66 12 97 % -- --   25 1400 131/70 -- -- 69 20 95 % -- --   25 1350 132/71 -- -- 69 13 95 % -- --   25 1340 130/70 -- -- 68 (!) 8 92 % -- --   25 1335 120/67 -- -- 69 -- -- -- --   25 1330 120/67 -- -- 69 17 93 % -- --   25 1325 121/60 36.2 °C (97.2 °F) Temporal 68 16 -- -- --   25 1015 116/61 36.3 °C (97.4 °F) Temporal 72 18 97 % -- --   25 0959 125/63 -- -- 70 -- -- -- --   25 0827 106/60 36.6 °C (97.9 °F) Temporal 74 18 94 % -- --   25 0714 -- -- -- 61 -- -- -- --   25 0431 -- -- -- (!) 117 -- -- -- --   25 0400 128/70 36.5 °C (97.7 °F) Temporal (!) 124 18 97 % -- --   25 0039 -- -- -- (!) 131 -- -- -- --   25 0035 131/71 36.6 °C (97.8 °F) Temporal (!) 125 18 97 % -- 132 kg (290 lb 8 oz)   256 123/87 -- -- (!) 110 -- -- -- --   25 2203 123/87 -- -- (!) 124 16 95 % -- --   25 2154 109/82 -- -- (!) 137 -- -- -- --   25 2044 121/69 37.3 °C (99.1 °F) Temporal 87 16 97 % -- --   25 1734 126/62 37.7 °C (99.9 °F) Oral 78 18 97 % 1.93 m (6' 4\") 134 kg (295 lb)       Temp (72hrs), Av.8 °C (98.2 °F), Min:36.2 °C (97.2 °F), Max:37.7 °C (99.9 °F)      Physical Exam:    Visit Vitals  BP (!) 157/93 (BP Location: Right upper arm, Patient Position: Lying)   Pulse 72   Temp 36.8 °C (98.3 °F) (Oral)   Resp 18   Ht 1.93 m (6' 4\")   Wt 132 kg (290 lb 8 oz)   SpO2 97%   BMI 35.36 kg/m²     General appearance: alert, appears stated age, cooperative, and no distress  Eyes:  anicteric  Lungs:  nonlabored, no cough  Abdomen: soft, ND, NT, obese  Extremities:  right knee wrapped "   Neurologic: Mental status: Alert, oriented, thought content appropriate    Lines, Drains, Airways, Wounds:  Peripheral IV (Adult) 02/02/25 Distal;Posterior;Right Forearm (Active)   Number of days: 1       Surgical Incision Knee Anterior;Right (Active)   Number of days: 2       Labs:    CBC Results         02/03/25 02/02/25 02/01/25     0559 0544 0411    WBC 9.70 12.63 14.97    RBC 3.27 3.47 3.94    HGB 10.0 10.4 11.7    HCT 30.0 32.3 35.1    MCV 91.7 93.1 89.1    MCH 30.6 30.0 29.7    MCHC 33.3 32.2 33.3     329 358          BMP Results         02/03/25 02/02/25 02/01/25     0559 0544 0411     136 135    K 3.8 3.6 3.1    Cl 100 97 95    CO2 29 30 30    Glucose 251 216 247    BUN 18 27 28    Creatinine 0.9 1.1 1.5    Calcium 9.2 9.1 9.5    Anion Gap 7 9 10    EGFR >60.0 >60.0 53.0           Comment for EGFR at 0559 on 02/03/25: Calculation based on the Chronic Kidney Disease Epidemiology Collaboration (CKD-EPI) equation refit without adjustment for race.    Comment for EGFR at 0544 on 02/02/25: Calculation based on the Chronic Kidney Disease Epidemiology Collaboration (CKD-EPI) equation refit without adjustment for race.    Comment for EGFR at 0411 on 02/01/25: Calculation based on the Chronic Kidney Disease Epidemiology Collaboration (CKD-EPI) equation refit without adjustment for race.          PT/PTT Results         02/01/25 01/31/25 01/26/22     0411 2112 0805    PT -- 16.3 13.2    INR -- 1.3 1.0    PTT 31 27 --           Comment for INR at 2112 on 01/31/25: Moderate Intensity Anticoagulation = 2.0 to 3.0, High Intensity = 2.5 to 3.5    Comment for INR at 0805 on 01/26/22: INR has no defined significance when PT is within Reference Range.          UA Results         01/02/22     1915    Color Yellow    Clarity Clear    Glucose Negative    Bilirubin Negative    Ketones +2    Sp Grav 1.010    Blood +2    Ph 6.0    Protein Trace    Urobilinogen 0.2    Nitrite Negative    Leuk Est Trace    WBC 4 TO 10     RBC 0 TO 4    Bacteria None Seen           Comment for Ketones at 1915 on 01/02/22: Free sulfhydryl drugs such as Mesna, Capoten, and Acetylcysteine (Mucomyst) may cause false positive ketonuria.    Comment for Blood at 1915 on 01/02/22: The sensitivity of the occult blood test is equivalent to approximately 4 intact RBC/HPF.    Comment for Protein at 1915 on 01/02/22: Trace False Positive Protein can be seen with alkaline or highly buffered urines or urine with high specific gravity. Suggest clinical correlation.          Lactate Results         01/31/25 2101    Lactate 1.8            Microbiology Results       Procedure Component Value Units Date/Time    Anaerobic Culture / Smear (includes Aerobic Culture) Knee, Right [[id number]]  (Abnormal) Collected: 02/01/25 1246    Specimen: Tissue from Knee, Right Updated: 02/03/25 1157     Culture **Positive Culture**      3+ Streptococcus, beta hemolytic Group B     Gram Stain Result 4+ WBC      1+ Gram positive cocci    Blood Culture Blood, Venous [[id number]]  (Normal) Collected: 01/31/25 2101    Specimen: Blood, Venous Updated: 02/02/25 2300     Culture No growth at 48 hours    Blood Culture Blood, Venous [[id number]]  (Normal) Collected: 01/31/25 2101    Specimen: Blood, Venous Updated: 02/02/25 2300     Culture No growth at 48 hours            Pathology Results       ** No results found for the last 720 hours. **            Echo:         Imaging:    Radiology Imaging    XR CHEST 2 VW    Narrative  CLINICAL HISTORY:   cough, wheeze    COMMENT:    VIEWS: two - frontal and lateral.    Comparison date: 1/3/2022.    The heart and mediastinal contours are within    normal limits.  The trachea is midline.  The lungs are clear without discrete  infiltrate.  There are no pleural effusions.  The osseous structures are intact.    Impression  IMPRESSION:  No active disease.      Assessment     Right knee native joint septic arthritis  - ortho surgery following, s/p  arthroscopic irrigation and debridement on 2/1/25  - OR c/s with group B Streptococcus  - CT A/P pending  - afebrile  - associated leukocytosis resolved  - blood c/s sterile so far     Diabetes - glucose > 200's, mgmt per primary team            Plan     Continue Ancef pending further c/s analysis, recommend improved glucose control to assist with infection control and wound healing

## 2025-02-03 NOTE — PLAN OF CARE
Plan of Care Review  Progress: improving  Outcome Evaluation: Patient is AAOx4, normal sinus on the monitor, medicated for pain and discomfort with dilaudid. Patient continues to get IV antibiotics, call bell  within rewach and bed in the lowest position.

## 2025-02-03 NOTE — PROGRESS NOTES
S  Pt seen and examined at bedside this morning. Reports pain well controlled. Denies numbness or tingling.    O  Pt is awake, alert and orientedx3  VSS, NAD  Right knee dressing is c/d/i  Motor exam is intact +Q/DF/PF/EHL  DP/PT pulses   SILT  Compartments are soft and compressible    2/1 right knee OR cultures pending +GBS    A  58 y/o male s/p I&D arthroscopic washout right native knee joint with Dr. Osuna on 2/1/2025    P  -Pain control  -WBAT RLE  - DVT prophylaxis: heparin per medicine- OK for DOAC  - PT/OT  - ABX vanc/cefepime, f/u cultures. Appreciate ID recs. Cultures NGTD  - albumin 3.2. nutrition consult needed  - A1c 9.6 - need endocrinology consult for uncontrolled diabetes  -Rec f/u CT abdomen per ID recs

## 2025-02-04 LAB
ANION GAP SERPL CALC-SCNC: 8 MEQ/L (ref 3–15)
BACTERIA BLD CULT: NORMAL
BACTERIA BLD CULT: NORMAL
BACTERIA URNS QL MICRO: ABNORMAL /HPF
BILIRUB UR QL STRIP.AUTO: NEGATIVE MG/DL
BUN SERPL-MCNC: 12 MG/DL (ref 7–25)
CALCIUM SERPL-MCNC: 8.8 MG/DL (ref 8.6–10.3)
CHLORIDE SERPL-SCNC: 100 MEQ/L (ref 98–107)
CLARITY UR REFRACT.AUTO: CLEAR
CO2 SERPL-SCNC: 28 MEQ/L (ref 21–31)
COLOR UR AUTO: COLORLESS
CREAT SERPL-MCNC: 0.8 MG/DL (ref 0.7–1.3)
EGFRCR SERPLBLD CKD-EPI 2021: >60 ML/MIN/1.73M*2
ERYTHROCYTE [DISTWIDTH] IN BLOOD BY AUTOMATED COUNT: 12.8 % (ref 11.6–14.4)
GLUCOSE BLD-MCNC: 161 MG/DL (ref 70–99)
GLUCOSE BLD-MCNC: 215 MG/DL (ref 70–99)
GLUCOSE BLD-MCNC: 232 MG/DL (ref 70–99)
GLUCOSE BLD-MCNC: 286 MG/DL (ref 70–99)
GLUCOSE SERPL-MCNC: 204 MG/DL (ref 70–99)
GLUCOSE UR STRIP.AUTO-MCNC: ABNORMAL MG/DL
HCT VFR BLD AUTO: 30.1 % (ref 40.1–51)
HGB BLD-MCNC: 9.9 G/DL (ref 13.7–17.5)
HGB UR QL STRIP.AUTO: NEGATIVE
HYALINE CASTS #/AREA URNS LPF: ABNORMAL /LPF
KETONES UR STRIP.AUTO-MCNC: NEGATIVE MG/DL
LEUKOCYTE ESTERASE UR QL STRIP.AUTO: 1
MCH RBC QN AUTO: 30.1 PG (ref 28–33.2)
MCHC RBC AUTO-ENTMCNC: 32.9 G/DL (ref 32.2–36.5)
MCV RBC AUTO: 91.5 FL (ref 83–98)
NITRITE UR QL STRIP.AUTO: NEGATIVE
PH UR STRIP.AUTO: 7 [PH]
PLATELET # BLD AUTO: 358 K/UL (ref 150–350)
PMV BLD AUTO: 10.5 FL (ref 9.4–12.4)
POCT TEST: ABNORMAL
POTASSIUM SERPL-SCNC: 3.8 MEQ/L (ref 3.5–5.1)
PROT UR QL STRIP.AUTO: NEGATIVE
RBC # BLD AUTO: 3.29 M/UL (ref 4.5–5.8)
RBC #/AREA URNS HPF: ABNORMAL /HPF
SODIUM SERPL-SCNC: 136 MEQ/L (ref 136–145)
SP GR UR REFRACT.AUTO: 1.01
SQUAMOUS URNS QL MICRO: ABNORMAL /HPF
UROBILINOGEN UR STRIP-ACNC: 0.2 EU/DL
WBC # BLD AUTO: 9.73 K/UL (ref 3.8–10.5)
WBC #/AREA URNS HPF: ABNORMAL /HPF

## 2025-02-04 PROCEDURE — 63700000 HC SELF-ADMINISTRABLE DRUG: Performed by: STUDENT IN AN ORGANIZED HEALTH CARE EDUCATION/TRAINING PROGRAM

## 2025-02-04 PROCEDURE — 99232 SBSQ HOSP IP/OBS MODERATE 35: CPT | Performed by: STUDENT IN AN ORGANIZED HEALTH CARE EDUCATION/TRAINING PROGRAM

## 2025-02-04 PROCEDURE — 63700000 HC SELF-ADMINISTRABLE DRUG: Performed by: NURSE PRACTITIONER

## 2025-02-04 PROCEDURE — 20600000 HC ROOM AND CARE INTERMEDIATE/TELEMETRY

## 2025-02-04 PROCEDURE — 87086 URINE CULTURE/COLONY COUNT: CPT | Performed by: STUDENT IN AN ORGANIZED HEALTH CARE EDUCATION/TRAINING PROGRAM

## 2025-02-04 PROCEDURE — 36415 COLL VENOUS BLD VENIPUNCTURE: CPT | Performed by: NURSE PRACTITIONER

## 2025-02-04 PROCEDURE — 97530 THERAPEUTIC ACTIVITIES: CPT | Mod: GP

## 2025-02-04 PROCEDURE — 63600000 HC DRUGS/DETAIL CODE: Mod: JZ,TB | Performed by: INTERNAL MEDICINE

## 2025-02-04 PROCEDURE — 81003 URINALYSIS AUTO W/O SCOPE: CPT | Performed by: NURSE PRACTITIONER

## 2025-02-04 PROCEDURE — 80048 BASIC METABOLIC PNL TOTAL CA: CPT | Performed by: NURSE PRACTITIONER

## 2025-02-04 PROCEDURE — 25800000 HC PHARMACY IV SOLUTIONS: Performed by: INTERNAL MEDICINE

## 2025-02-04 PROCEDURE — 85027 COMPLETE CBC AUTOMATED: CPT | Performed by: NURSE PRACTITIONER

## 2025-02-04 PROCEDURE — 63600000 HC DRUGS/DETAIL CODE: Performed by: HOSPITALIST

## 2025-02-04 PROCEDURE — 25800000 HC PHARMACY IV SOLUTIONS: Performed by: HOSPITALIST

## 2025-02-04 PROCEDURE — 63700000 HC SELF-ADMINISTRABLE DRUG: Performed by: HOSPITALIST

## 2025-02-04 PROCEDURE — 63700000 HC SELF-ADMINISTRABLE DRUG: Performed by: INTERNAL MEDICINE

## 2025-02-04 RX ORDER — CEPHALEXIN 500 MG/1
1000 CAPSULE ORAL 4 TIMES DAILY
Status: DISCONTINUED | OUTPATIENT
Start: 2025-02-04 | End: 2025-02-05 | Stop reason: HOSPADM

## 2025-02-04 RX ORDER — INSULIN GLARGINE 100 [IU]/ML
30 INJECTION, SOLUTION SUBCUTANEOUS DAILY
Status: DISCONTINUED | OUTPATIENT
Start: 2025-02-05 | End: 2025-02-05 | Stop reason: HOSPADM

## 2025-02-04 RX ORDER — INSULIN LISPRO 100 [IU]/ML
16 INJECTION, SOLUTION INTRAVENOUS; SUBCUTANEOUS
Status: DISCONTINUED | OUTPATIENT
Start: 2025-02-05 | End: 2025-02-05

## 2025-02-04 RX ADMIN — CEFAZOLIN 2 G: 2 INJECTION, POWDER, FOR SOLUTION INTRAMUSCULAR; INTRAVENOUS at 09:16

## 2025-02-04 RX ADMIN — INSULIN GLARGINE 20 UNITS: 100 INJECTION, SOLUTION SUBCUTANEOUS at 13:52

## 2025-02-04 RX ADMIN — HYDROMORPHONE HYDROCHLORIDE 0.5 MG: 1 INJECTION, SOLUTION INTRAMUSCULAR; INTRAVENOUS; SUBCUTANEOUS at 05:25

## 2025-02-04 RX ADMIN — DILTIAZEM HYDROCHLORIDE 120 MG: 120 CAPSULE, EXTENDED RELEASE ORAL at 09:09

## 2025-02-04 RX ADMIN — SENNOSIDES 1 TABLET: 8.6 TABLET ORAL at 09:10

## 2025-02-04 RX ADMIN — CEFAZOLIN 2 G: 2 INJECTION, POWDER, FOR SOLUTION INTRAMUSCULAR; INTRAVENOUS at 00:48

## 2025-02-04 RX ADMIN — APIXABAN 5 MG: 5 TABLET, FILM COATED ORAL at 19:45

## 2025-02-04 RX ADMIN — INSULIN LISPRO 8 UNITS: 100 INJECTION, SOLUTION INTRAVENOUS; SUBCUTANEOUS at 09:13

## 2025-02-04 RX ADMIN — SODIUM CHLORIDE: 9 INJECTION, SOLUTION INTRAVENOUS at 05:25

## 2025-02-04 RX ADMIN — HYDROMORPHONE HYDROCHLORIDE 0.5 MG: 1 INJECTION, SOLUTION INTRAMUSCULAR; INTRAVENOUS; SUBCUTANEOUS at 15:35

## 2025-02-04 RX ADMIN — CEPHALEXIN 1000 MG: 500 CAPSULE ORAL at 21:15

## 2025-02-04 RX ADMIN — INSULIN LISPRO 10 UNITS: 100 INJECTION, SOLUTION INTRAVENOUS; SUBCUTANEOUS at 17:11

## 2025-02-04 RX ADMIN — CEPHALEXIN 1000 MG: 500 CAPSULE ORAL at 17:11

## 2025-02-04 RX ADMIN — INSULIN LISPRO 10 UNITS: 100 INJECTION, SOLUTION INTRAVENOUS; SUBCUTANEOUS at 09:08

## 2025-02-04 RX ADMIN — HYDROMORPHONE HYDROCHLORIDE 0.5 MG: 1 INJECTION, SOLUTION INTRAMUSCULAR; INTRAVENOUS; SUBCUTANEOUS at 10:56

## 2025-02-04 RX ADMIN — INSULIN LISPRO 10 UNITS: 100 INJECTION, SOLUTION INTRAVENOUS; SUBCUTANEOUS at 11:51

## 2025-02-04 RX ADMIN — ASPIRIN 81 MG: 81 TABLET, COATED ORAL at 09:10

## 2025-02-04 RX ADMIN — INSULIN LISPRO 4 UNITS: 100 INJECTION, SOLUTION INTRAVENOUS; SUBCUTANEOUS at 17:12

## 2025-02-04 RX ADMIN — INSULIN LISPRO 8 UNITS: 100 INJECTION, SOLUTION INTRAVENOUS; SUBCUTANEOUS at 21:24

## 2025-02-04 ASSESSMENT — COGNITIVE AND FUNCTIONAL STATUS - GENERAL
CLIMB 3 TO 5 STEPS WITH RAILING: 2 - A LOT
STANDING UP FROM CHAIR USING ARMS: 3 - A LITTLE
STANDING UP FROM CHAIR USING ARMS: 3 - A LITTLE
AFFECT: WFL
STANDING UP FROM CHAIR USING ARMS: 3 - A LITTLE
MOVING TO AND FROM BED TO CHAIR: 3 - A LITTLE
CLIMB 3 TO 5 STEPS WITH RAILING: 2 - A LOT
WALKING IN HOSPITAL ROOM: 3 - A LITTLE
WALKING IN HOSPITAL ROOM: 3 - A LITTLE
CLIMB 3 TO 5 STEPS WITH RAILING: 3 - A LITTLE
MOVING TO AND FROM BED TO CHAIR: 3 - A LITTLE
WALKING IN HOSPITAL ROOM: 3 - A LITTLE
MOVING TO AND FROM BED TO CHAIR: 3 - A LITTLE

## 2025-02-04 NOTE — PROGRESS NOTES
Cardiology Daily Progress Note    SUBJECTIVE      Lake Dandy Martin is a 59 y.o. male who was admitted for New onset atrial fibrillation (CMS/HCC) [I48.91]  Pre-op evaluation [Z01.818]  Atrial fibrillation with rapid ventricular response (CMS/HCC) [I48.91]  Staphylococcal arthritis of right knee (CMS/HCC) [M00.061].    INTERVAL HISTORY:  Patient seen and examined. No chest pain or palpitations     MEDICATIONS     CURRENT IP MEDS:  • aspirin  81 mg oral BID   • ceFAZolin  2 g intravenous q8h INT   • dilTIAZem CD  120 mg oral Daily   • [Provider Managed Hold] losartan  100 mg oral Daily    And   • [Provider Managed Hold] hydrochlorothiazide  25 mg oral Daily   • insulin glargine U-100  20 Units subcutaneous Daily   • insulin lispro U-100  0-16 Units subcutaneous Before meals & nightly   • insulin lispro U-100  10 Units subcutaneous TID with meals   • polyethylene glycol  17 g oral Daily   • senna  2 tablet oral Daily     • [Provider Managed Hold] heparin infusion - MAR calculator by PTT  100-4,000 Units/hr Stopped (02/01/25 0601)   • sodium chloride 0.9 %   80 mL/hr at 02/04/25 0525       OBJECTIVE     Vital signs in last 24 hours:  Temp:  [36.8 °C (98.2 °F)-37.6 °C (99.7 °F)] 36.8 °C (98.2 °F)  Heart Rate:  [72-80] 79  Resp:  [18] 18  BP: (148-169)/(81-93) 161/87      Intake/Output Summary (Last 24 hours) at 2/4/2025 0940  Last data filed at 2/4/2025 0800  Gross per 24 hour   Intake 10 ml   Output --   Net 10 ml       WEIGHTS  Wt Readings from Last 3 Encounters:   02/01/25 132 kg (290 lb 8 oz)   12/06/24 (!) 140 kg (307 lb 12.8 oz)   09/13/24 (!) 140 kg (308 lb)       PHYSICAL EXAM:  Physical Exam  Constitutional:       Appearance: Normal appearance.   HENT:      Head: Normocephalic and atraumatic.      Nose: Nose normal.      Mouth/Throat:      Mouth: Mucous membranes are dry.   Eyes:      Conjunctiva/sclera: Conjunctivae normal.   Cardiovascular:      Rate and Rhythm: Normal rate and regular rhythm.       Pulses: Normal pulses.      Heart sounds: Normal heart sounds, S1 normal and S2 normal.   Pulmonary:      Effort: Pulmonary effort is normal.      Breath sounds: Normal breath sounds.   Abdominal:      Palpations: Abdomen is soft.   Musculoskeletal:      Cervical back: Neck supple.      Right lower leg: No edema.      Left lower leg: No edema.      Comments: Right knee wrapped    Skin:     General: Skin is warm and dry.   Neurological:      General: No focal deficit present.      Mental Status: He is alert and oriented to person, place, and time.   Psychiatric:         Attention and Perception: Attention and perception normal.         Mood and Affect: Mood normal.         LABS / IMAGING / EKG / TELEMETRY     LABS:   Results from last 7 days   Lab Units 02/04/25  0556 02/03/25  0559 02/02/25  0544   SODIUM mEQ/L 136 136 136   POTASSIUM mEQ/L 3.8 3.8 3.6   CHLORIDE mEQ/L 100 100 97*   CO2 mEQ/L 28 29 30   BUN mg/dL 12 18 27*   CREATININE mg/dL 0.8 0.9 1.1   GLUCOSE mg/dL 204* 251* 216*   CALCIUM mg/dL 8.8 9.2 9.1       Results from last 7 days   Lab Units 01/31/25  1754   AST IU/L 16     Results from last 7 days   Lab Units 01/31/25  1754   ALT IU/L 10     Results from last 7 days   Lab Units 02/04/25  0556 02/03/25  0559 02/02/25  0544   WBC K/uL 9.73 9.70 12.63*   HEMOGLOBIN g/dL 9.9* 10.0* 10.4*   HEMATOCRIT % 30.1* 30.0* 32.3*   PLATELETS K/uL 358* 306 329       Results from last 7 days   Lab Units 02/01/25  0411 01/31/25  2152   HIGH SENSITIVE TROPONIN I pg/mL 12.9 15.6*     PT/PTT Results         02/01/25 01/31/25 01/26/22     0411 2112 0805    PT -- 16.3 13.2    INR -- 1.3 1.0    PTT 31 27 --           Comment for INR at 2112 on 01/31/25: Moderate Intensity Anticoagulation = 2.0 to 3.0, High Intensity = 2.5 to 3.5    Comment for INR at 0805 on 01/26/22: INR has no defined significance when PT is within Reference Range.          Lab Results   Component Value Date    CHOL 217 (H) 08/02/2023    TRIG 172 (H)  08/02/2023    HDL 53 08/02/2023    LDLCALC 133 (H) 08/02/2023    TSH 0.77 02/01/2025    HGBA1C 9.6 (H) 02/01/2025       Imaging  CT ABDOMEN PELVIS WITH IV CONTRAST    Result Date: 2/3/2025  IMPRESSION:  Partially obstructing right renal pelvis calcification. Nonobstructing right renal calculus. No findings of right-sided hydronephrosis. Slightly delayed right nephrogram compared to the contralateral side with perinephric stranding. Correlate clinically and with urinalysis to assess for possible urinary tract infection. No hydronephrosis or calculi of the left kidney. No GI obstruction. Colonic fecal retention. Surgical anastomosis of the rectosigmoid colon with presumed postsurgical change/posttreatment change within the pelvis. Please see above for complete details.       ECG   No new ECG.    TELEMETRY sinus rhythm       ASSESSMENT AND PLAN     Assessment & Plan  Streptococcal arthritis of right knee (CMS/HCC)  Per orthopedics. S/p I and D in the OR  Diabetes mellitus (CMS/HCC)  Per primary team   Primary hypertension  Continue home medications. Replete K.   New onset atrial fibrillation (CMS/HCC)  With atrial fibrillation and atrial flutter. Episodes last 5-10 minutes and then in sinus. Continue Cardizem gtt. Acceptable risk for OR if needed. CHADS2-VASC of 2 and recommend DOAC when no surgical procedures are needed. Can do echocardiogram inpatient or outpatient. Replete K.     2/2-in sinus now.It was likely a stress response to infection. Would recommend DOAC when able. Can use lose dose Cardizem 120mg daily.   Hypokalemia  Replete     Thank you for allowing us to participate in the care of this patient. Please call with any questions.       Scott Soliz, DO  2/4/2025

## 2025-02-04 NOTE — PLAN OF CARE
Plan of Care Review  Plan of Care Reviewed With: patient  Progress: improving  Outcome Evaluation: Pt AAOx4. Tolerated all meds, abx admin per order w/ no adverse reactions noted. C/o R knee pain, PRN pain med admin per order w/ positive results. Bed in lowest position, bed alarm on, call bell within reach and educated to use for assistance

## 2025-02-04 NOTE — DISCHARGE INSTRUCTIONS
ORTHOPEDIC RECOMMENDATIONS     Right septic knee     2/1/2025, Right knee arthroscopic irrigation and debridement  (Dr. Star Osuna)     Weight bearing as tolerated     Follow up in 2 weeks with [facility]- Dr. Star Osuna, call [phone]       Deep Vein Thrombosis Prophylaxis:  -Continue with ELIQUIS as prescribed      Constipation/ Pain Medication:  -  Ice packs can be applied to areas of swelling for 10-15 minutes every 3-4 hours  - Start out taking the pain medication as prescribed, as your pain starts to subside,          decrease the dosage or time interval between the pain pills.   - Take your pain medication with food to prevent nausea  - Do not drive while taking pain medications.   - Pain medications may cause constipation.   - Drink plenty of fluids and eat fresh fruits and vegetables.  - Please take over the counter Senokot S 1-2 tablet(s) by mouth daily as needed for constipation.  - If you have not had a bowel movement in three days please call the office.  - DO NOT SMOKE! Smoking is not healthy for your healing process.      Incision:  - You may take off your dressing and leave your incision open to air. May wash wash with soap & water.   - However, if there is any drainage please keep covered with gauze and tape.  - If the drainage continues longer than 2 days please call the office  - Do not place any ointments, creams, or lotions on your incision   - Please keep your incision(s) clean and dry  - Make sure your incision(s) are checked at least twice daily for signs and symptoms of infection.      If any of the below should occur, please call the office:  - Drainage from incision site  - Opening of incisions   - Fevers greater than 101  - Flu-like symptoms  - Increased redness and/or tenderness     Please call office or go to emergency department if :  - Thigh/calf pain or swelling in legs  - Chest pain  - Chest congestion  - Problems with  breathing.    ____________________________________________________________________________________    You were evaluated for the following in the hospital:     Right knee infection - please complete Keflex as prescribed and follow up with orthopedics. You will need labs checked while on Keflex. Please discuss with your primary care provider. Please follow up with your primary care provider or orthopedics for pain medication if you need to discuss refills.  Right renal pelvis calcification - you were seen by urology. Please follow up with urology as outpatient for stone management.  Atrial fibrillation - you were started on Eliquis and Cardizem CD. Please follow up with cardiology.  Diabetes - you were started on LANTUS insulin once a day. Please follow up with endocrinology. Please take Prandin as prescribed.  Acute kidney injury - resolved. Your Creatinine is back to baseline. Avoid NSAIDS medications, such as Advil, Ibuprofen, Motrin, Aleve, Naproxen, Celebrex, Toradol, Mobic. These medications can worsen your kidney function.    Please follow up with your primary care provider. Please call for an appointment and specify that it is for a hospital follow-up.    Please get the following lab work done after discharge - please ask your primary care provider for the lab prescriptions:     Check BMP, CBC while on Keflex.     Follow up with the recommended specialists after discharge. Please call their offices for an appointment if you do not have one already scheduled.    Please review your medication reconciliation closely as your medications may have changed.      Please call your primary care provider for symptoms including, but not limited to: fevers (temps > 100.4F or 38.1C), chills, intractable nausea or vomiting, diarrhea, rash, shortness of breath, bleeding, pain, or if you have worsening of symptoms that brought you to the hospital. For EMERGENCY and SERIOUS health-related issues, you may need to go directly to  the Emergency Room or call 911.      Please bring a copy of your after visit summary and discharge instructions to your follow-up appointments.     It was a pleasure taking care of you!  ___________________________________________

## 2025-02-04 NOTE — PROGRESS NOTES
Physical Therapy -  Daily Treatment/Progress Note     Patient: Lake Martin  Location: [address]  MRN: [id number]  Today's date: 2/4/2025    HISTORY OF PRESENT ILLNESS     Lake is a 59 y.o. male admitted on 1/31/2025 with New onset atrial fibrillation (CMS/HCC) [I48.91]  Pre-op evaluation [Z01.818]  Atrial fibrillation with rapid ventricular response (CMS/HCC) [I48.91]  Staphylococcal arthritis of right knee (CMS/HCC) [M00.061]. Principal problem is Streptococcal arthritis of right knee (CMS/HCC).    Past Medical History  Lake has a past medical history of Arthritis, COVID-19 vaccine series completed, Diabetes (CMS/HCC), Eye injury, Hypertension, Migraine, Pulmonary nodules (12/16/2021), and Type 2 diabetes mellitus (CMS/[facility]).    History of Present Illness   Pt presents with R knee infection.  Symptoms began 1 week ago with acute onset of R knee swelling without injury.  He was seen at [facility] earlier this week and joint aspiration was performed and the fluid sent for culture. In the time since, the culture has growth Strep. He was referred to the ER for admission.     S/p wash out of R knee, performed by Dr. Osuna on 2/1/25  PRIOR LEVEL OF FUNCTION AND LIVING ENVIRONMENT     Prior Level of Function      Flowsheet Row Most Recent Value   Dominant Hand right   Ambulation assistive equipment   Transferring independent   Toileting independent   Bathing independent   Dressing independent   Eating independent   IADLs independent   Driving/Transportation    Communication understands/communicates without difficulty   Prior Level of Function Comment Pt reports mod I with SPC. Independent with ADLs/IADLs. +Drives.    Assistive Device Currently Used at Home cane, straight             Prior Living Environment      Flowsheet Row Most Recent Value   People in Home spouse, child(beni), adult   Current Living Arrangements home   Home Accessibility not wheelchair  accessible   Living Environment Comment 2SH, 1STE, NV, FF to 2nd floor, WIS without DME. Standard height toilet. FFSU available          VITALS AND PAIN     PT Vitals      Date/Time Pulse SpO2 Pt Activity O2 Therapy Who   02/04/25 1326 75 99 % At rest None (Room air) ERS          PT Pain      Date/Time Side/Orientation Location Rating: Rest Rating: Activity Interventions [facility]   02/04/25 1326 right knee 2 7 relaxation techniques promoted;quiet environment facilitated;position adjusted;diversional activity provided;breathing exercises utilized ERS             Objective   OBJECTIVE     Start time:  1326  End time:  1344  Session Length: 18 min       General Observations  Patient received in bed. He was agreeable to therapy, no issues or concerns identified by nurse prior to session. pillow under R knee    Precautions: fall, weight bearing  Extremity Weight-Bearing Status: right lower extremity  Right LE Weight-Bearing Status: weight-bearing as tolerated (WBAT)           PT Eval and Treat - 02/04/25 1326          Cognition    Orientation Status oriented x 4     Affect/Mental Status WFL     Follows Commands WFL     Cognitive Function WFL        Bed Mobility    Bed Mobility Activities right;supine to sit;sit to supine     [place] supervision     Assistive Device head of bed elevated        Mobility Belt    Mobility Belt Used During Session no - patient refused        Sit/Stand Transfer    Surface edge of bed     [place] close supervision     Safety/Cues hand placement     Assistive Device walker, front-wheeled        Gait Training    [place], Gait close supervision     Safety/Cues increased time to complete;technique;proper use of assistive device     Assistive Device walker, front-wheeled     Distance in Feet 60 feet     Pattern 3-point;step-to     Deviations/Abnormal Patterns antalgic;right sided deviations;gait speed decreased;weight shifting decreased     Right Sided Gait Deviations heel strike decreased      Comment lacking knee ext during RLE stance phase.  Amb distance limited by 7/10 R knee pain        Balance    Static Sitting Balance WFL;sitting, edge of bed     Dynamic Sitting Balance WFL;sitting, edge of bed     Sit to Stand Dynamic Balance mild impairment;supported     Static Standing Balance mild impairment;supported     Dynamic Standing Balance moderate impairment;supported     Comment, Balance with RW        Lower Extremity (Therapeutic Exercise)    Exercise Position/Type supine     General Exercise right;quad sets;SAQ (short arc quad);heel slides     Reps and Sets 5x,     Comment Pt educated on proper pillow positioning to maximize R knee ROM        Impairments/Safety Issues    Impairments Affecting Function balance;endurance/activity tolerance;range of motion (ROM);pain;strength     Functional Endurance good -, limited by R knee pain                                                 Session Outcome  Patient upright, in bed at end of session, bed alarm on, all needs met, call light in reach, personal items in reach. Nursing notified about patient's response to therapy/activity, patient's performance, patient's position, and change in vital signs.    AM-PAC™ - Mobility (Current Function)     Turning form your back to your side while in flat bed without using bedrails 3 - A Little   Moving from lying on your back to sitting on the side of a flat bed without using bedrails 3 - A Little   Moving to and from a bed to a chair 3 - A Little   Standing up from a chair using your arms 3 - A Little   To walk in a hospital room 3 - A Little   Climbing 3-5 steps with a railing 2 - A Lot   AM-PAC™ Mobility Score 17      ASSESSMENT AND PLAN     Progress Summary  Pt presents at close supervision functional mobility level with use of RW, which is a decline from his baseline mod I mobility with SPC.  Ampac=17, which indicates home dispo.  Pt will benefit from continued skilled PT to maximize independence and  safety.    Patient/Family Therapy Goals Statement: to get better    PT Plan      Flowsheet Row Most Recent Value   Rehab Potential good, to achieve stated therapy goals at 02/04/2025 1326   Therapy Frequency 4 times/wk at 02/04/2025 1326   Planned Therapy Interventions balance training, bed mobility training, gait training, patient/family education, stair training, strengthening, transfer training, ROM (range of motion) at 02/04/2025 1326            PT Discharge Recommendations      Flowsheet Row Most Recent Value   PT Recommended Discharge Disposition home with assistance, home with home health at 02/04/2025 1326   Anticipated Equipment Needs if Discharged Home (PT) walker, front-wheeled  at 02/04/2025 1326                 PT Goals      Flowsheet Row Most Recent Value   Bed Mobility Goal 1    Activity/Assistive Device bed mobility activities, all at 02/02/2025 1444   [place] modified independence at 02/02/2025 1444   Time Frame by discharge at 02/02/2025 1444   Progress/Outcome goal ongoing at 02/02/2025 1444   Transfer Goal 1    Activity/Assistive Device all transfers, walker, front-wheeled at 02/02/2025 1444   [place] modified independence at 02/02/2025 1444   Time Frame by discharge at 02/02/2025 1444   Progress/Outcome goal ongoing at 02/02/2025 1444   Gait Training Goal 1    Activity/Assistive Device gait (walking locomotion), walker, front-wheeled at 02/02/2025 1444   [place] modified independence at 02/02/2025 1444   Distance 45 at 02/02/2025 1444   Time Frame by discharge at 02/02/2025 1444   Progress/Outcome goal ongoing at 02/02/2025 1444   Stairs Goal 1    Activity/Assistive Device stairs, all skills, walker, front-wheeled  [curb] at 02/02/2025 1444   [place] supervision required at 02/02/2025 1444   Number of Stairs 1 at 02/02/2025 1444   Time Frame by discharge at 02/02/2025 1444   Progress/Outcome goal ongoing at 02/02/2025 1444

## 2025-02-04 NOTE — PROGRESS NOTES
Division of Hospital Medicine -  Daily Progress Note       SUBJECTIVE   Interval History: Pt reports he discussed his care with attending physician already. Not interested in answering further questions at this time.    Last Bowel Movement: 01/31/25        OBJECTIVE      Vital signs in last 24 hours:  Temp:  [36.7 °C (98.1 °F)-37.6 °C (99.7 °F)] 36.7 °C (98.1 °F)  Heart Rate:  [70-80] 70  Resp:  [18] 18  BP: (141-169)/(81-87) 141/86    Intake/Output Summary (Last 24 hours) at 2/4/2025 1334  Last data filed at 2/4/2025 1145  Gross per 24 hour   Intake 10 ml   Output 1800 ml   Net -1790 ml       PHYSICAL EXAMINATION        General: NAD  Skin: no diaphoresis  HEENT: Normocephalic, atraumatic  Cardiovascular: pt not amenable to exam  Pulmonary: no audible wheezing, no conversational dyspnea, no labored breathing, on room air  Abdomen: pt not amenable to exam  Extremities: pt not amenable to exam  Musculoskeletal: no deformities  Neuro: alert and awake  Psychiatric: no agitation       LINES, CATHETERS, DRAINS, AIRWAYS, AND WOUNDS   Lines, Drains, and Airways:  Wounds (agree with documentation and present on admission):  Peripheral IV (Adult) 02/02/25 Distal;Posterior;Right Forearm (Active)   Number of days: 2       Surgical Incision Knee Anterior;Right (Active)   Number of days: 3       Comments: reviewed     LABS / IMAGING / TELE      Labs: reviewed    Lab Results   Component Value Date    GLUCOSE 204 (H) 02/04/2025    CALCIUM 8.8 02/04/2025     02/04/2025    K 3.8 02/04/2025    CO2 28 02/04/2025     02/04/2025    BUN 12 02/04/2025    CREATININE 0.8 02/04/2025     Lab Results   Component Value Date    WBC 9.73 02/04/2025    HGB 9.9 (L) 02/04/2025    HCT 30.1 (L) 02/04/2025    MCV 91.5 02/04/2025     (H) 02/04/2025       Imaging: reviewed    CT ABDOMEN PELVIS WITH IV CONTRAST   Final Result   IMPRESSION:  Partially obstructing right renal pelvis calcification.   Nonobstructing right renal calculus.  No findings of right-sided hydronephrosis.   Slightly delayed right nephrogram compared to the contralateral side with   perinephric stranding. Correlate clinically and with urinalysis to assess for   possible urinary tract infection.      No hydronephrosis or calculi of the left kidney.      No GI obstruction. Colonic fecal retention. Surgical anastomosis of the   rectosigmoid colon with presumed postsurgical change/posttreatment change within   the pelvis.      Please see above for complete details.      ECG 12 lead   ED Interpretation   A-fib 148  Nonspecific ST-T wave abnormality      Final Result           Microbiology Data: reviewed  Microbiology Results       Procedure Component Value Units Date/Time    Anaerobic Culture / Smear (includes Aerobic Culture) Knee, Right [[id number]]  (Abnormal) Collected: 02/01/25 1246    Specimen: Tissue from Knee, Right Updated: 02/04/25 1025     Culture **Positive Culture**      3+ Streptococcus, beta hemolytic Group B     Gram Stain Result 4+ WBC      1+ Gram positive cocci    Blood Culture Blood, Venous [[id number]]  (Normal) Collected: 01/31/25 2101    Specimen: Blood, Venous Updated: 02/03/25 2300     Culture No growth at 72 hours    Blood Culture Blood, Venous [[id number]]  (Normal) Collected: 01/31/25 2101    Specimen: Blood, Venous Updated: 02/03/25 2300     Culture No growth at 72 hours             ECG/Telemetry: reviewed    Continue telemetry    ASSESSMENT AND PLAN        # Streptococcal arthritis of right knee (CMS/HCC)  - s/p OR 2/1 for R knee washout  - 2/1/25 OR cx +group B strep     - ortho following - WBAT RLE; ok for DOAC from their standpoint  - ID following - on ancef  - f/u 1/31/25 blood cx - ngtd  - post op PT rec home with home health, home with assistance  - dvt ppx - on asa bid for now pending DOAC initiation  - pain control - tylenol prn, dilaudid iv prn, oxy prn  - bowel regimen  - IS     # Sepsis with acute organ dysfunction  - SIRS: HR: 124 (2/1/25  4:00), WBC: 15.0 (2/1/25)  - white blood cell count: 9.7 (2/4/25) down from last abnormal 12.6 (2/2/25), high 19.7 (1/31/25)  - SOFA score: 2 (2/4/25)  - T: 36.7 ºC / HR: 70 / RR: 18 (2/4/25 12:11), T high: 37.7 ºC (1/31/25 17:34)  - abx reviewed elsewhere     # R renal pelvis calcification  - 2/3 CT a/p showed partially obstructing right renal pelvis calcification. Nonobstructing right renal calculus. No findings of right-sided hydronephrosis. Slightly delayed right nephrogram compared to the contralateral side with perinephric stranding. No hydronephrosis or calculi of the left kidney.   - urology consult - no hydronephrosis on CT and patient currently asymptomatic. No need for emergent surgical intervention this admission. Recommend outpatient follow to discuss definitive stone treatment given size of calculi     # Paroxysmal atrial fibrillation  - heart rate range: 70-80 (since 2/3/25 14:59)  - DWPFR4CTEC score: 2 (2/4/25), moderate-high risk of stroke (2.2% per year)  - new onset AF, was likely a stress response to infection  - TSH wnl  - s/p diltizem gtt     - sinus on tele  - per prior discussion w/ cardiology, ok to hold off on resuming heparin gtt post-op, start DOAC when able  - cleared by ortho for DOAC  - pt's family testing $10 coupon for eliquis at pharmacy to make sure it works - CM to f/u  - eliquis initiation pending above, pt is not amenable to DOAC if cost prohibitive  - currently on: diltiazem 120 mg po daily  - tele monitoring     # Diabetes Mellitus, Type 2: uncontrolled  - hemoglobin A1c: 9.6 % (2/1/25) down from 10.0 % (8/2/23)  - glucose range: 204-286 (since 2/3/25)  - holding home: metFORMIN tablet po  - added diabetic diet  - endo consult - insulin per recs  - nutrition following  - currently on: insulin glargine 20 units sc daily  - currently on: insulin lispro 0 - 16 units sc QID  - currently on: insulin lispro 10 units sc TID     # Anemia: acute on chronic, due to iron deficiency,  stable, normocytic  - est. baseline hemoglobin: 11.2 g/dL  - hemoglobin: 9.9 (25) down from 10 (2/3/25)  - pt is post-op  - check iron panel  - trend cbc     # Acute kidney injury  - creatinine: 0.8 (25) down from 0.9 (2/3/25), high 1.5 (25)  - est Cr baseline: 0.91 mg/dL  - trend bmp  - renally dose meds as indicated  - avoid nephrotoxic agents  - monitor w/ losartan resumption     # Hypertension  - 24hr BP range: (141-169) / (81-87) (25)  - holding home: amlodipine 5 mg po, hydrochlorothiazide-losartan 12.5 mg-50 mg 2 tabs po  - currently on: diltiazem 120 mg po daily  - cardiology following  - added cardiac diet  - resume losartan  - let IVF      VTE Assessment: [place] VTE Score: 2  VTE Prophylaxis:  Current anticoagulants:  [Provider Managed Hold] heparin (porcine) bolus from bag 4,250-8,500 Units, intravenous, q6h PRN  [Provider Managed Hold] heparin infusion in D5W 100 units/mL, intravenous, Titrated        Code Status: Full Code  Palliative Care Screening Score: 0   Estimated Discharge Date: 2025     Disposition Planning: f/u ID recs, DOAC CARLA Kiser  2025

## 2025-02-04 NOTE — ASSESSMENT & PLAN NOTE
The patient's fasting glucose was 215, 11 AM glucose 286, 4 PM glucose 161.  The patient presently is on this 20 units at night and lispro 10 units with meals.  He believes that his right knee has improved.  Impression: Fair glycemic control.  Recommendations: I will increase Lantus to 30 units tonight and lispro to 16 units with meals

## 2025-02-04 NOTE — PROGRESS NOTES
Patient seen and examined this am  Resting in bed; c/o pain right knee  A&O NAD VSS  RK dressing CDI  RLE SILT compartments soft compressible  + motor RLE EHL/DF/PF  +RLE pulses DP/PT        POD 3 S/P right knee I&D for native septic arthritis that is growing Grp B strep      Pain mgmt  DVT ppx: per medicine  PT/OT WBAT RLE  IV ABX per ID  Bowel regime  Endo - appreciate recs  Disp pending clearance

## 2025-02-04 NOTE — PROGRESS NOTES
Subjective   The patient tolerates his diet.  Interval History: Patient improved   no new issues.     Objective     279  215 286  161  POC         DIET:       Dietary Orders   (From admission, onward)                 Start     Ordered    02/04/25 0810  Adult Diet Regular; Consistent Carbohydrate 2000; Low Fat; 2gm Sodium; No Concentrated Sweets; RD/LDN may adjust order  Diet effective now        References:    IDDSI Diet reference   Question Answer Comment   Diet Texture Regular    Diabetic Restrictions: Consistent Carbohydrate 2000    Fat restriction: Low Fat    Sodium Restriction: 2gm Sodium    Other Restriction(s): No Concentrated Sweets    Delegation of Authority. Diet orders written by PA/Nazia may not be adjusted by RD/LDNs. RD/LDN may adjust order        02/04/25 0809                    Vital signs in last 24 hours:  Temp:  [36.7 °C (98.1 °F)-37.6 °C (99.7 °F)] 36.9 °C (98.4 °F)  Heart Rate:  [70-80] 71  Resp:  [18] 18  BP: (141-169)/(83-89) 157/89    Labs  Glucose Results         02/01/25     0411    HBG A1C 9.6    EST AVG GLUCOSE 229           Comment for HBG A1C at 0411 on 02/01/25: In the absence of an established diagnosis of Diabetes Mellitus, HbA1c levels between 5.7% and 6.4% are indicative of increased risk for developing Diabetes(Pre-Diabetes). Levels of 6.5% or greater are diagnostic for Diabetes Mellitus.    Comment for EST AVG GLUCOSE at 0411 on 02/01/25: Estimate of average glucose concentration continuously over 24 hours for previous 2 to 3 months(Per ADA Recommendation).              Physical Exam:  General :      Improved  Skin:             Unchanged  Extremities:  Unchanged  Neurologic:   Unchanged    Impression:  Assessment & Plan  Streptococcal arthritis of right knee (CMS/HCC)    Diabetes mellitus (CMS/HCC)  The patient's fasting glucose was 215, 11 AM glucose 286, 4 PM glucose 161.  The patient presently is on this 20 units at night and lispro 10 units with meals.  He believes that his  right knee has improved.  Impression: Fair glycemic control.  Recommendations: I will increase Lantus to 30 units tonight and lispro to 16 units with meals      Pop Lemon MD  2/4/2025 6:56 PM

## 2025-02-04 NOTE — CONSULTS
Urology Consultation    [place] Martin is a 59 y.o. male who was admitted for New onset atrial fibrillation (CMS/[facility]) [I48.91]  Pre-op evaluation [Z01.818]  Atrial fibrillation with rapid ventricular response (CMS/[facility]) [I48.91]  Staphylococcal arthritis of right knee (CMS/[facility]) [M00.061]. Patient seen for partially obstructing calculus, delayed nephrogram   Patient seen at request of Dr. Vasquez    Patient is a 59 y.o. male with a past medical history of colon cancer (in remission x 3 years), HTN, migraines, T2DM and pulmonary nodules who presented to the ER with right knee infection. He was seen at [facility] last week and joint aspiration was performed and the fluid sent for culture. In the time since, the culture has growth Strep. He was referred to the ER for admission. In the ER WBC 19.69, creatinine 1.4. Evaluated by Ortho and is s/p right knee arthroscopic irrigation and debridement 2/1/25. CT a/p order per ID which revealed a 12 mm right renal pelvis calcification that maybe partially obstructing. A non obstructing 10 mm right lower pole calculus also seen. No clemente right hydronephrosis on CT. Slight delayed right nephrogram and perinephric stranding also seen. No left hydronephrosis or calculi seen. Today WBC 9.73, creatinine 0.8. Patient resting in bed on exam. He reports feeling well today. Denies nausea, vomiting, fever, and chills. Reports knowing about the kidney stones. Reports they are currently being monitored by his Oncologist. Denies a history of UTIs, hematuria, urinary retention. Has never followed with a Urologist. Urinal at the bedside with clear anna urine. Patient denies pain with urination and difficulty emptying his bladder. Denies any flank or back pain since admission.       Subjective   no  complaints       Medical History:   Past Medical History:   Diagnosis Date    Arthritis     knees    COVID-19 vaccine series completed     pfizer plus booster    Diabetes (CMS/[facility])     Eye injury      Right eye pressure injury    Hypertension     Migraine     hasnt had one in approx 2 yrs -2019    Pulmonary nodules 12/16/2021    Type 2 diabetes mellitus (CMS/HCC)        Surgical History:   Past Surgical History   Procedure Laterality Date    Colectomy      Colonoscopy  2021    COLONOSCOPY, HOT SNARE POLYPECTOMY, TATOOING N/A 12/29/2021    Performed by Tiarra Martinez MD at  GI    cystoscopy placement rukhsana ureteral catheters Bilateral 12/30/2021    Performed by Nat Payne MD at  OR    LOW ANTERIOR RESECTION LAPAROSCOPIC ROBOTIC Robotic Takedown Splenic Flexure, Primary Colorectal anastimosis EEA # 28 Placement of ADITYA drain and Peritoneal Lavage N/A 12/30/2021    Performed by Tiarra Martinez MD at  OR    RIGHT KNEE ARTHROSCOPIC WASH OUT Right 2/1/2025    Performed by Star Osuna MD at  OR [facility]       Social History: never smoker, occasional ETOH use     Family History:   Family History   Problem Relation Name Age of Onset    Other Biological Mother          pacemaker    Stroke Biological Father      Diabetes Biological Father      Heart failure Biological Father      No Known Problems Biological Sister      Lung cancer Maternal Grandfather          Smoker 50 years     Colon cancer Neg Hx      Rectal cancer Neg Hx         Allergies: Patient has no known allergies.    Current Facility-Administered Medications   Medication Dose Route Frequency Provider Last Rate Last Admin    acetaminophen (TYLENOL) tablet 650 mg  650 mg oral q4h PRN Scott Kuo DO   650 mg at 02/02/25 0831    aspirin enteric coated tablet 81 mg  81 mg oral BID Scott Kuo DO   81 mg at 02/04/25 0910    ceFAZolin (ANCEF) 2 g/100 mL NSS  2 g intravenous q8h INT Antwon Todd  mL/hr at 02/04/25 0916 2 g at 02/04/25 0916    glucose chewable tablet 16-32 g of dextrose  16-32 g of dextrose oral PRN Scott Kuo DO        Or    dextrose 40 % oral gel 15-30 g of dextrose  15-30 g of dextrose oral PRN  Scott Kuo DO        Or    glucagon (GLUCAGEN) injection 1 mg  1 mg intramuscular PRN Scott Kuo DO        Or    dextrose 50 % in water (D50) injection 12.5 g  25 mL intravenous PRN Scott Kuo DO        dilTIAZem CD (CARDIZEM CD) 24 hr ER capsule 120 mg  120 mg oral Daily Scott Soliz DO   120 mg at 02/04/25 0909    [Provider Managed Hold] heparin (porcine) bolus from bag 4,250-8,500 Units  40-80 Units/kg (Adjusted) intravenous q6h PRN Scott Kuo DO   8,500 Units at 02/01/25 0519    [Provider Managed Hold] heparin infusion in D5W 100 units/mL  100-4,000 Units/hr intravenous Titrated Scott Kuo DO   Stopped at 02/01/25 0601    [Provider Managed Hold] losartan (COZAAR) tablet 100 mg  100 mg oral Daily Scott Kuo DO        And    [Provider Managed Hold] hydrochlorothiazide (HYDRODIURIL) tablet 25 mg  25 mg oral Daily Scott Kuo DO        HYDROmorphone (DILAUDID) injection 0.5 mg  0.5 mg intravenous q4h PRN Scott Kuo DO   0.5 mg at 02/04/25 0525    insulin glargine U-100 (LANTUS/BASAGLAR) pen 20 Units  20 Units subcutaneous Daily Scott Kuo DO        insulin lispro U-100 (HumaLOG) pen 0-16 Units  0-16 Units subcutaneous Before meals & nightly Pop Lemon MD   8 Units at 02/04/25 0913    insulin lispro U-100 (HumaLOG) pen 10 Units  10 Units subcutaneous TID with meals Pop Lemon MD   10 Units at 02/04/25 0908    oxyCODONE (ROXICODONE) immediate release tablet 5 mg  5 mg oral q4h PRN Scott Kuo DO   5 mg at 02/02/25 0831    polyethylene glycol (MIRALAX) 17 gram packet 17 g  17 g oral Daily Latonya Bey CRNP        senna (SENOKOT) tablet 2 tablet  2 tablet oral Daily Ana Abdullahi CRNP   1 tablet at 02/04/25 0910    sodium chloride 0.9 % infusion   intravenous Continuous Hemperly, Scott BUNDY, DO 80 mL/hr at 02/04/25 0525 New Bag at 02/04/25 0525     Review of Systems  Pertinent items are noted in  HPI.        Labs  CBC Results         02/04/25 02/03/25 02/02/25     0556 0559 0544    WBC 9.73 9.70 12.63    RBC 3.29 3.27 3.47    HGB 9.9 10.0 10.4    HCT 30.1 30.0 32.3    MCV 91.5 91.7 93.1    MCH 30.1 30.6 30.0    MCHC 32.9 33.3 32.2     306 329          BMP Results         02/04/25 02/03/25 02/02/25     0556 0559 0544     136 136    K 3.8 3.8 3.6    Cl 100 100 97    CO2 28 29 30    Glucose 204 251 216    BUN 12 18 27    Creatinine 0.8 0.9 1.1    Calcium 8.8 9.2 9.1    Anion Gap 8 7 9    EGFR >60.0 >60.0 >60.0           Comment for EGFR at 0556 on 02/04/25: Calculation based on the Chronic Kidney Disease Epidemiology Collaboration (CKD-EPI) equation refit without adjustment for race.    Comment for EGFR at 0559 on 02/03/25: Calculation based on the Chronic Kidney Disease Epidemiology Collaboration (CKD-EPI) equation refit without adjustment for race.    Comment for EGFR at 0544 on 02/02/25: Calculation based on the Chronic Kidney Disease Epidemiology Collaboration (CKD-EPI) equation refit without adjustment for race.          PT/PTT Results         02/01/25 01/31/25 01/26/22     0411 2112 0805    PT -- 16.3 13.2    INR -- 1.3 1.0    PTT 31 27 --           Comment for INR at 2112 on 01/31/25: Moderate Intensity Anticoagulation = 2.0 to 3.0, High Intensity = 2.5 to 3.5    Comment for INR at 0805 on 01/26/22: INR has no defined significance when PT is within Reference Range.          UA Results         01/02/22 1915    Color Yellow    Clarity Clear    Glucose Negative    Bilirubin Negative    Ketones +2    Sp Grav 1.010    Blood +2    Ph 6.0    Protein Trace    Urobilinogen 0.2    Nitrite Negative    Leuk Est Trace    WBC 4 TO 10    RBC 0 TO 4    Bacteria None Seen           Comment for Ketones at 1915 on 01/02/22: Free sulfhydryl drugs such as Mesna, Capoten, and Acetylcysteine (Mucomyst) may cause false positive ketonuria.    Comment for Blood at 1915 on 01/02/22: The sensitivity of the occult  blood test is equivalent to approximately 4 intact RBC/HPF.    Comment for Protein at 1915 on 01/02/22: Trace False Positive Protein can be seen with alkaline or highly buffered urines or urine with high specific gravity. Suggest clinical correlation.          Lactate Results         01/31/25     2101    Lactate 1.8          Lab Results   Component Value Date    PSA <0.1 08/02/2023    PSA 0.1 03/05/2021    PSA 0.1 12/31/2019       Imaging  CT a/p w./ [facility]TR 2/3/25  The lung bases are clear. The osseous structures have mild to moderate  degenerative change. Bilateral chronic appearing pars defects of L5 with grade 1  anterolisthesis of L5 on S1.    No obvious abnormal enhancement of the liver. The main portal vein is patent.    No gallstones or biliary duct dilatation by CT    No obvious abnormal enhancement of the spleen. Accessory splenule near the  hilum.    No obvious abnormal enhancement of the pancreas.    No discrete adrenal gland mass. Asymmetric nephrograms, relatively delayed on  the right. Perinephric stranding more so on the right than the left.    On delayed images there is excretion of contrast material opacifying the left  greater than right collecting system. No findings of left-sided hydronephrosis.  No findings of left-sided renal or ureteral calculi.    Right renal pelvis 12 mm partially obstructing calculus. Right lower pole 10 mm  nonobstructing calculus. No findings of right-sided hydroureteronephrosis.    No findings of GI obstruction. Normal appearance to the terminal ileum and  appendix. Mild to moderate colonic fecal retention. Colonic diverticulosis  without diverticulitis. Surgical anastomosis of the rectosigmoid colon. Mild  prominence of the perirectal fat which may be postsurgical in nature. The  visualized pelvic organs including the prostate gland and seminal vesicles are  within normal limits. Presacral soft tissue stranding reticulation which may be  postsurgical/posttreatment.    No  "free air, fluid or enlarged by size retroperitoneal lymph nodes.   Impression:     IMPRESSION:  Partially obstructing right renal pelvis calcification.  Nonobstructing right renal calculus. No findings of right-sided hydronephrosis.  Slightly delayed right nephrogram compared to the contralateral side with  perinephric stranding. Correlate clinically and with urinalysis to assess for  possible urinary tract infection.    No hydronephrosis or calculi of the left kidney.    No GI obstruction. Colonic fecal retention. Surgical anastomosis of the  rectosigmoid colon with presumed postsurgical change/posttreatment change within  the pelvis.    Please see above for complete details.         Physicial Exam  Visit Vitals  BP (!) 161/87 (BP Location: Right upper arm, Patient Position: Lying)   Pulse 79   Temp 36.8 °C (98.2 °F) (Oral)   Resp 18   Ht 1.93 m (6' 4\")   Wt 132 kg (290 lb 8 oz)   SpO2 95%   BMI 35.36 kg/m²     General appearance: alert, appears stated age, cooperative, and no distress  Head: normocephalic, without obvious abnormality, atraumatic  Neck: supple, symmetrical, trachea midline  Back:  no CVA tenderness to palpation  Chest wall:  breathing non labored  Abdomen: normal findings: soft, non-tender and non distended  Male genitalia: normal findings: no urethral discharge and no bladder tenderness to palpation  Rectal: deferred  Skin: temperature normal        Assessment   - 12 mm right renal pelvis calcification that maybe partially obstructing. A non obstructing 10 mm right lower pole calculus also seen. No clemente right hydronephrosis on CT. Slight delayed right nephrogram and perinephric stranding also seen. No left hydronephrosis or calculi seen  - AZUL: creatinine of 1.4 on admission, improved to 0.8 today     Plan   - Monitor labs/UOP  - Supportive care per primary service   - Antibiotics per ID  - No hydronephrosis on CT and patient currently asymptomatic. No need for emergent surgical intervention this " admission. Recommend outpatient follow to discuss definitive stone treatment given size of calculi  - Will follow along and see as needed     ESTHER Blanco  2/4/2025  10:09 AM

## 2025-02-04 NOTE — PROGRESS NOTES
Infectious Disease Progress Note    Patient Name: Lake Martin  MR#: [id number]  : 1965  Admission Date: 2025  Date: 25   Time: 11:43 AM   Author: CARLA Saldana    Major Events:     Antibiotics:    Anti-infectives (From admission, onward)      Start     Dose/Rate Route Frequency Ordered Stop    25 1745  ceFAZolin (ANCEF) 2 g/100 mL NSS         2 g  200 mL/hr over 30 Minutes intravenous Every 8 hours interval 25 1654              Subjective   Patient states he is surprised by the CT results, knows about the kidney stones, does not feel he has a UTI or back pain or abdominal pain presently, tolerating diet, hoping to go home soon    Review of Systems    Pertinent items are noted in HPI.    Objective     Vital Signs:    Patient Vitals for the past 72 hrs:   BP Temp Temp src Pulse Resp SpO2   25 0959 -- -- -- 72 -- --   25 0822 (!) 161/87 36.8 °C (98.2 °F) Oral 79 18 95 %   25 0550 (!) 169/85 37.6 °C (99.6 °F) Temporal 79 18 93 %   25 0300 -- -- -- 78 -- --   25 2306 (!) 157/84 37.4 °C (99.3 °F) Temporal 77 18 96 %   25 1951 (!) 156/83 37.6 °C (99.7 °F) Oral 80 18 98 %   25 1938 -- -- -- 74 -- --   25 1553 (!) 148/81 36.8 °C (98.3 °F) Oral 75 18 95 %   25 1459 -- -- -- 78 -- --   25 1202 (!) 157/93 36.8 °C (98.3 °F) Oral 72 18 97 %   25 0831 (!) 167/85 36.8 °C (98.3 °F) Oral 76 18 97 %   25 0659 -- -- -- 75 -- --   25 0551 (!) 142/81 37.2 °C (98.9 °F) Temporal 78 17 95 %   25 (!) 145/79 37 °C (98.6 °F) Temporal 80 17 96 %   25 -- -- -- 78 -- --   25 (!) 146/75 36.4 °C (97.6 °F) Temporal 79 17 94 %   25 -- -- -- 76 -- --   25 1515 -- -- -- 76 -- --   25 1508 (!) 156/92 -- -- 83 -- 100 %   25 1449 126/74 -- -- 79 -- 98 %   25 1445 129/77 37 °C (98.6 °F) Oral 79 17 99 %   25 1130 132/76 37.1 °C (98.7 °F) Oral 70 16 97 %  "  25 0745 132/75 36.9 °C (98.4 °F) Oral 86 16 98 %   25 0714 -- -- -- 75 -- --   25 0400 126/67 37.2 °C (99 °F) Oral 79 18 100 %   25 2359 -- -- -- 74 -- --   25 2345 134/77 36.8 °C (98.2 °F) Oral 73 18 100 %   25 1952 122/67 36.3 °C (97.3 °F) Temporal 70 16 97 %   25 1859 -- -- -- 69 -- --   25 1710 (!) 112/58 -- -- 67 -- --   25 1544 -- -- -- 69 -- --   25 1540 116/62 36.7 °C (98 °F) Temporal 71 16 95 %   25 1502 116/62 36.4 °C (97.6 °F) Oral 72 14 94 %   25 1440 125/67 36.6 °C (97.9 °F) Temporal 67 (!) 11 96 %   25 1430 120/62 -- -- 67 (!) 10 96 %   25 1420 125/66 -- -- 68 17 93 %   25 1410 121/64 -- -- 66 12 97 %   25 1400 131/70 -- -- 69 20 95 %   25 1350 132/71 -- -- 69 13 95 %   25 1340 130/70 -- -- 68 (!) 8 92 %   25 1335 120/67 -- -- 69 -- --   25 1330 120/67 -- -- 69 17 93 %   25 1325 121/60 36.2 °C (97.2 °F) Temporal 68 16 --       Temp (72hrs), Av.9 °C (98.4 °F), Min:36.2 °C (97.2 °F), Max:37.6 °C (99.7 °F)      Physical Exam:    Visit Vitals  BP (!) 141/86 (BP Location: Right upper arm, Patient Position: Lying)   Pulse 75   Temp 36.7 °C (98.1 °F) (Oral)   Resp 18   Ht 1.93 m (6' 4\")   Wt 132 kg (290 lb 8 oz)   SpO2 99%   BMI 35.36 kg/m²     General appearance: alert, appears stated age, cooperative, and no distress  Lungs:  nonlabore,d no cough  Abdomen:  ND, NT, obese  Extremities:  right knee wrapped with intact clean and dry dressing  Neurologic: Mental status: Alert, oriented, thought content appropriate    Lines, Drains, Airways, Wounds:  Peripheral IV (Adult) 25 Distal;Posterior;Right Forearm (Active)   Number of days: 2       Surgical Incision Knee Anterior;Right (Active)   Number of days: 3       Labs:    CBC Results         25     0556 0559 0544    WBC 9.73 9.70 12.63    RBC 3.29 3.27 3.47    HGB 9.9 10.0 10.4    HCT 30.1 30.0 32.3    " MCV 91.5 91.7 93.1    MCH 30.1 30.6 30.0    MCHC 32.9 33.3 32.2     306 329          BMP Results         02/04/25 02/03/25 02/02/25     0556 0559 0544     136 136    K 3.8 3.8 3.6    Cl 100 100 97    CO2 28 29 30    Glucose 204 251 216    BUN 12 18 27    Creatinine 0.8 0.9 1.1    Calcium 8.8 9.2 9.1    Anion Gap 8 7 9    EGFR >60.0 >60.0 >60.0           Comment for EGFR at 0556 on 02/04/25: Calculation based on the Chronic Kidney Disease Epidemiology Collaboration (CKD-EPI) equation refit without adjustment for race.    Comment for EGFR at 0559 on 02/03/25: Calculation based on the Chronic Kidney Disease Epidemiology Collaboration (CKD-EPI) equation refit without adjustment for race.    Comment for EGFR at 0544 on 02/02/25: Calculation based on the Chronic Kidney Disease Epidemiology Collaboration (CKD-EPI) equation refit without adjustment for race.          PT/PTT Results         02/01/25 01/31/25 01/26/22     0411 2112 0805    PT -- 16.3 13.2    INR -- 1.3 1.0    PTT 31 27 --           Comment for INR at 2112 on 01/31/25: Moderate Intensity Anticoagulation = 2.0 to 3.0, High Intensity = 2.5 to 3.5    Comment for INR at 0805 on 01/26/22: INR has no defined significance when PT is within Reference Range.          UA Results         01/02/22 1915    Color Yellow    Clarity Clear    Glucose Negative    Bilirubin Negative    Ketones +2    Sp Grav 1.010    Blood +2    Ph 6.0    Protein Trace    Urobilinogen 0.2    Nitrite Negative    Leuk Est Trace    WBC 4 TO 10    RBC 0 TO 4    Bacteria None Seen           Comment for Ketones at 1915 on 01/02/22: Free sulfhydryl drugs such as Mesna, Capoten, and Acetylcysteine (Mucomyst) may cause false positive ketonuria.    Comment for Blood at 1915 on 01/02/22: The sensitivity of the occult blood test is equivalent to approximately 4 intact RBC/HPF.    Comment for Protein at 1915 on 01/02/22: Trace False Positive Protein can be seen with alkaline or highly  buffered urines or urine with high specific gravity. Suggest clinical correlation.          Lactate Results         01/31/25 2101    Lactate 1.8            Microbiology Results       Procedure Component Value Units Date/Time    Anaerobic Culture / Smear (includes Aerobic Culture) Knee, Right [[id number]]  (Abnormal) Collected: 02/01/25 1246    Specimen: Tissue from Knee, Right Updated: 02/04/25 1025     Culture **Positive Culture**      3+ Streptococcus, beta hemolytic Group B     Gram Stain Result 4+ WBC      1+ Gram positive cocci    Blood Culture Blood, Venous [[id number]]  (Normal) Collected: 01/31/25 2101    Specimen: Blood, Venous Updated: 02/03/25 2300     Culture No growth at 72 hours    Blood Culture Blood, Venous [[id number]]  (Normal) Collected: 01/31/25 2101    Specimen: Blood, Venous Updated: 02/03/25 2300     Culture No growth at 72 hours            Pathology Results       ** No results found for the last 720 hours. **            Echo:         Imaging:    Radiology Imaging    XR CHEST 2 VW    Narrative  CLINICAL HISTORY:   cough, wheeze    COMMENT:    VIEWS: two - frontal and lateral.    Comparison date: 1/3/2022.    The heart and mediastinal contours are within    normal limits.  The trachea is midline.  The lungs are clear without discrete  infiltrate.  There are no pleural effusions.  The osseous structures are intact.    Impression  IMPRESSION:  No active disease.      Assessment     Right knee native joint septic arthritis  - ortho surgery following, s/p arthroscopic irrigation and debridement on 2/1/25  - OR c/s with group B Streptococcus  - CT A/P reviewed  - afebrile  - associated leukocytosis resolved  - blood c/s sterile so far    Abnormal CT- patient states he was aware of renal calculi PTA and denies any urinary symptoms presently, no abdominal pain or back pain     Diabetes - glucose > 200's, mgmt per primary team           Plan     Adjusted to oral Keflex for the remainder of the course,  patient should have monitoring labs while on antibiotics which can be done by his primary care office, local incision care per ortho, please recall ID team if needed to re-evaluate the patient

## 2025-02-04 NOTE — PLAN OF CARE
Plan of Care Review  Plan of Care Reviewed With: patient  Progress: no change  Outcome Evaluation: Pt AOx4. Pain meds given for R knee pain. Ax1 to bathroom. IVF/IV antibiotics.

## 2025-02-05 VITALS
OXYGEN SATURATION: 96 % | WEIGHT: 290.5 LBS | RESPIRATION RATE: 16 BRPM | BODY MASS INDEX: 35.38 KG/M2 | TEMPERATURE: 98.9 F | DIASTOLIC BLOOD PRESSURE: 83 MMHG | SYSTOLIC BLOOD PRESSURE: 170 MMHG | HEART RATE: 86 BPM | HEIGHT: 76 IN

## 2025-02-05 LAB
ANION GAP SERPL CALC-SCNC: 6 MEQ/L (ref 3–15)
BACTERIA UR CULT: NORMAL
BUN SERPL-MCNC: 10 MG/DL (ref 7–25)
CALCIUM SERPL-MCNC: 9 MG/DL (ref 8.6–10.3)
CHLORIDE SERPL-SCNC: 99 MEQ/L (ref 98–107)
CO2 SERPL-SCNC: 31 MEQ/L (ref 21–31)
CREAT SERPL-MCNC: 0.7 MG/DL (ref 0.7–1.3)
EGFRCR SERPLBLD CKD-EPI 2021: >60 ML/MIN/1.73M*2
ERYTHROCYTE [DISTWIDTH] IN BLOOD BY AUTOMATED COUNT: 12.8 % (ref 11.6–14.4)
GLUCOSE BLD-MCNC: 203 MG/DL (ref 70–99)
GLUCOSE BLD-MCNC: 220 MG/DL (ref 70–99)
GLUCOSE SERPL-MCNC: 186 MG/DL (ref 70–99)
HCT VFR BLD AUTO: 32.9 % (ref 40.1–51)
HGB BLD-MCNC: 10.8 G/DL (ref 13.7–17.5)
MCH RBC QN AUTO: 29.8 PG (ref 28–33.2)
MCHC RBC AUTO-ENTMCNC: 32.8 G/DL (ref 32.2–36.5)
MCV RBC AUTO: 90.6 FL (ref 83–98)
PLATELET # BLD AUTO: 349 K/UL (ref 150–350)
PMV BLD AUTO: 9.8 FL (ref 9.4–12.4)
POCT TEST: ABNORMAL
POCT TEST: ABNORMAL
POTASSIUM SERPL-SCNC: 3.8 MEQ/L (ref 3.5–5.1)
RBC # BLD AUTO: 3.63 M/UL (ref 4.5–5.8)
SODIUM SERPL-SCNC: 136 MEQ/L (ref 136–145)
WBC # BLD AUTO: 8.24 K/UL (ref 3.8–10.5)

## 2025-02-05 PROCEDURE — 63600000 HC DRUGS/DETAIL CODE: Mod: JZ,TB | Performed by: HOSPITALIST

## 2025-02-05 PROCEDURE — 36415 COLL VENOUS BLD VENIPUNCTURE: CPT | Performed by: NURSE PRACTITIONER

## 2025-02-05 PROCEDURE — 63700000 HC SELF-ADMINISTRABLE DRUG: Performed by: STUDENT IN AN ORGANIZED HEALTH CARE EDUCATION/TRAINING PROGRAM

## 2025-02-05 PROCEDURE — 63700000 HC SELF-ADMINISTRABLE DRUG: Performed by: INTERNAL MEDICINE

## 2025-02-05 PROCEDURE — 99239 HOSP IP/OBS DSCHRG MGMT >30: CPT | Performed by: STUDENT IN AN ORGANIZED HEALTH CARE EDUCATION/TRAINING PROGRAM

## 2025-02-05 PROCEDURE — 80048 BASIC METABOLIC PNL TOTAL CA: CPT | Performed by: NURSE PRACTITIONER

## 2025-02-05 PROCEDURE — 85027 COMPLETE CBC AUTOMATED: CPT | Performed by: NURSE PRACTITIONER

## 2025-02-05 PROCEDURE — 63700000 HC SELF-ADMINISTRABLE DRUG: Performed by: NURSE PRACTITIONER

## 2025-02-05 RX ORDER — FERROUS SULFATE 325(65) MG
325 TABLET ORAL
Qty: 30 TABLET | Refills: 0 | Status: SHIPPED | OUTPATIENT
Start: 2025-02-06 | End: 2025-03-14

## 2025-02-05 RX ORDER — OXYCODONE HYDROCHLORIDE 5 MG/1
5 TABLET ORAL EVERY 6 HOURS PRN
Qty: 12 TABLET | Refills: 0 | Status: SHIPPED | OUTPATIENT
Start: 2025-02-05 | End: 2025-03-07

## 2025-02-05 RX ORDER — REPAGLINIDE 1 MG/1
2 TABLET ORAL
Status: DISCONTINUED | OUTPATIENT
Start: 2025-02-05 | End: 2025-02-05 | Stop reason: HOSPADM

## 2025-02-05 RX ORDER — REPAGLINIDE 2 MG/1
2 TABLET ORAL
Qty: 90 TABLET | Refills: 0 | Status: SHIPPED | OUTPATIENT
Start: 2025-02-05 | End: 2025-02-28 | Stop reason: SDUPTHER

## 2025-02-05 RX ORDER — INSULIN DEGLUDEC 100 U/ML
30 INJECTION, SOLUTION SUBCUTANEOUS DAILY
Qty: 9 ML | Refills: 0 | Status: SHIPPED | OUTPATIENT
Start: 2025-02-05 | End: 2025-02-05 | Stop reason: HOSPADM

## 2025-02-05 RX ORDER — CEPHALEXIN 500 MG/1
1000 CAPSULE ORAL 4 TIMES DAILY
Qty: 202 CAPSULE | Refills: 0 | Status: SHIPPED | OUTPATIENT
Start: 2025-02-05 | End: 2025-03-14

## 2025-02-05 RX ORDER — DILTIAZEM HYDROCHLORIDE 120 MG/1
120 CAPSULE, COATED, EXTENDED RELEASE ORAL DAILY
Qty: 30 CAPSULE | Refills: 0 | Status: SHIPPED | OUTPATIENT
Start: 2025-02-06 | End: 2025-02-28 | Stop reason: SDUPTHER

## 2025-02-05 RX ORDER — FERROUS SULFATE 325(65) MG
325 TABLET ORAL
Status: DISCONTINUED | OUTPATIENT
Start: 2025-02-06 | End: 2025-02-05 | Stop reason: HOSPADM

## 2025-02-05 RX ORDER — INSULIN GLARGINE 100 [IU]/ML
30 INJECTION, SOLUTION SUBCUTANEOUS DAILY
Qty: 15 ML | Refills: 0 | Status: SHIPPED | OUTPATIENT
Start: 2025-02-05 | End: 2025-03-14

## 2025-02-05 RX ORDER — ACETAMINOPHEN 325 MG/1
650 TABLET ORAL EVERY 6 HOURS PRN
Start: 2025-02-05 | End: 2025-03-07

## 2025-02-05 RX ADMIN — INSULIN LISPRO 6 UNITS: 100 INJECTION, SOLUTION INTRAVENOUS; SUBCUTANEOUS at 12:48

## 2025-02-05 RX ADMIN — REPAGLINIDE 2 MG: 1 TABLET ORAL at 12:40

## 2025-02-05 RX ADMIN — INSULIN LISPRO 8 UNITS: 100 INJECTION, SOLUTION INTRAVENOUS; SUBCUTANEOUS at 09:18

## 2025-02-05 RX ADMIN — INSULIN GLARGINE 30 UNITS: 100 INJECTION, SOLUTION SUBCUTANEOUS at 09:18

## 2025-02-05 RX ADMIN — LOSARTAN POTASSIUM 100 MG: 100 TABLET ORAL at 09:14

## 2025-02-05 RX ADMIN — CEPHALEXIN 1000 MG: 500 CAPSULE ORAL at 09:14

## 2025-02-05 RX ADMIN — APIXABAN 5 MG: 5 TABLET, FILM COATED ORAL at 09:13

## 2025-02-05 RX ADMIN — CEPHALEXIN 1000 MG: 500 CAPSULE ORAL at 14:09

## 2025-02-05 RX ADMIN — DILTIAZEM HYDROCHLORIDE 120 MG: 120 CAPSULE, EXTENDED RELEASE ORAL at 09:13

## 2025-02-05 RX ADMIN — INSULIN LISPRO 16 UNITS: 100 INJECTION, SOLUTION INTRAVENOUS; SUBCUTANEOUS at 09:16

## 2025-02-05 RX ADMIN — HYDROMORPHONE HYDROCHLORIDE 0.5 MG: 1 INJECTION, SOLUTION INTRAMUSCULAR; INTRAVENOUS; SUBCUTANEOUS at 02:26

## 2025-02-05 ASSESSMENT — COGNITIVE AND FUNCTIONAL STATUS - GENERAL
MOVING TO AND FROM BED TO CHAIR: 3 - A LITTLE
CLIMB 3 TO 5 STEPS WITH RAILING: 3 - A LITTLE
WALKING IN HOSPITAL ROOM: 3 - A LITTLE
STANDING UP FROM CHAIR USING ARMS: 3 - A LITTLE

## 2025-02-05 NOTE — PROGRESS NOTES
Ortho Post OP Note    pt seen and examined, resting comfortably   pain well controlled, noted much improvement in pain, can tolerate putting weight to RLE, has been OOB with PT  pt awake, alert   VSS; no acute distress   RLE dressing CDI   +DP/PT pulses; feet warm, pink, brisk cap refill  Compartments soft/nontender and compressible  SILT, motor intact, +DF/PF/EHL, quad firing   Expected post-op swelling   SCDs     Hgb 10.9    2/1 OR cultures, strep B    A/P POD 4 Procedure(s) (LRB):  RIGHT KNEE ARTHROSCOPIC WASH OUT (Right) (2/1, Dr. Osuna)  D/w attending  pain management  DVT ppx: SCDS, per HMS/CCP> Eliquis   PT/OT   RLE: WBAT  Monitor I&Os   Neurovascular checks   ID recs: oral systemic cephalexin 1 g p.o. every 6 hours for an additional 26 days   Follow OR cultures     medical management  HMS/ID/CCP/urology  /endo       dispo: dc planning pending clearances   Ortho stable  Follow up with [facility]- Dr. Osuna in 2 weeks  Call for appt [phone]

## 2025-02-05 NOTE — DISCHARGE SUMMARY
Division of Hospital Medicine    Inpatient Discharge Summary        BRIEF OVERVIEW     Patient: Lake Martin  Admission Date: 2025   : 1965 Discharge Date: 2025       PCP: Leland Fishman MD  Disposition: Home     Destination: Home     Attending Provider: Mika Vasquez MD Attending phys phone: [phone]    Code Status At Discharge: Full Code        ASSESSMENT AND PLAN   # Streptococcal arthritis of right knee (CMS/HCC)  - s/p OR  for R knee washout  - 25 OR cx +group B strep     - ortho following - WBAT RLE; ok for DOAC from their standpoint  - ID following - complete abx course w/ keflex  - f/u 25 blood cx - ngtd  - post op PT rec home with home health, home with assistance  - dvt ppx - now off asa bid, on eliquis  - pain control - tylenol prn, dilaudid iv prn, oxy prn  - pt agreeable to oxycodone rx on d/c for pain - counseled on need to f/u with pcp and/or ortho for refills  - bowel regimen  - IS     # Sepsis with acute organ dysfunction  - SIRS: HR: 124 (25 4:00), WBC: 15.0 (25)  - white blood cell count: 8.2 (25) down from last abnormal 12.6 (25), high 19.7 (25)  - SOFA score: 2 (25)  - T: 36.9 ºC / HR: 85 / RR: 18 (25 8:15), T high: 37.7 ºC (25 17:34)  - abx reviewed elsewhere     # R renal pelvis calcification  - 2/3 CT a/p showed partially obstructing right renal pelvis calcification. Nonobstructing right renal calculus. No findings of right-sided hydronephrosis. Slightly delayed right nephrogram compared to the contralateral side with perinephric stranding. No hydronephrosis or calculi of the left kidney.  - urology consult - no hydronephrosis on CT and patient currently asymptomatic. No need for emergent surgical intervention this admission. Recommend outpatient follow to discuss definitive stone treatment given size of calculi  - f/u with Dr. Ceron's group     # Paroxysmal atrial fibrillation  - heart rate range: 70-86  (since 2/4/25 12:11)  - [id number] score: 2 (2/5/25), moderate-high risk of stroke (2.2% per year)  - new onset AF, was likely a stress response to infection  - TSH wnl  - s/p diltizem gtt     - sinus on tele  - per prior discussion w/ cardiology, ok to hold off on resuming heparin gtt post-op, start DOAC when able  - cleared by ortho for DOAC  - currently on: diltiazem 120 mg po daily  - tele monitoring  - currently on: apixaban 5 mg po BID     # Diabetes Mellitus, Type 2: uncontrolled  - hemoglobin A1c: 9.6 % (2/1/25) down from 10.0 % (8/2/23)  - glucose range: 161-232 (since 2/4/25)  - holding home: metFORMIN tablet po  - added diabetic diet  - endo consult - discussed w/ Dr. Lemon, for d/c, will start prandin and will need at least basal insulin  - rx provided for diabetic supplies - CM to price check  - CM to price check prandin - generic per CM  - CM to price check lantus pen - $60 for 4 pens, pt agreeable; consider tresiba as OP if copay is lower, pharmacy not answering calls at this time to price check tresiba  - diabetic teaching by nursing  - nutrition following  - currently on: insulin lispro 0 - 16 units sc QID  - currently on: repaglinide 2 mg po TID  - currently on: insulin glargine 30 units sc daily  - f/u with Dr. Lemon as OP     # Anemia: chronic, due to iron deficiency, stable, normocytic  - est. baseline hemoglobin: 11.2 g/dL  - hemoglobin: 10.8 (2/5/25) up from 9.9 (2/4/25)  - pt is post-op  - added po iron  - trend cbc  - total iron: 20, ferritin: 755, total iron binding capacity: 165, transferrin saturation: 12 % (2/3/25)     # Acute kidney injury  - creatinine: 0.7 (2/5/25) down from 0.8 (2/4/25), high 1.5 (2/1/25)  - est Cr baseline: 0.91 mg/dL  - trend bmp  - renally dose meds as indicated  - avoid nephrotoxic agents  - will resume losartan-hctz on d/c     # Hypertension  - 24hr BP range: (141-184) / (82-96) (2/5/25)  - holding home: amlodipine 5 mg po, hydrochlorothiazide-losartan 12.5  mg-50 mg 2 tabs po  - currently on: diltiazem 120 mg po daily  - cardiology following  - added cardiac diet  - resume losartan-hctz on d/c     Brief Hospital Course  Lake Dandy Martin is a 59 y.o. male  who  has a past medical history of Arthritis, COVID-19 vaccine series completed, Diabetes (CMS/[facility]), Eye injury, Hypertension, Migraine, Pulmonary nodules (12/16/2021), and Type 2 diabetes mellitus (CMS/[facility]).    He has no past medical history of History of transfusion., who presented to [facility] on 1/31/2025 for right knee culture growing strep.  Pt was found to have right knee bacterial arthritis. Met sepsis criteria.    Seen by orthopedics. Underwent OR 2/1 for R knee washout. 2/1/25 OR cx +group B strep. Seen by ID. Discharged on Keflex to complete abx course. Blood cx ngtd. Pt agreeable to oxycodone rx on d/c for pain - counseled on need to f/u with pcp and/or ortho for refills.     Found to have rapid AF in ED. Converted to sinus. Dx with PAF this admission. Seen by cardiology, likely a stress response to infection. TSH wnl. S/p Diltiazem gtt. Ok for DOAC for AF from ortho standpoint. Started on Eliquis, price checked prior to d/c.    ID recommended CT a/p and 2/3 CT a/p showed partially obstructing right renal pelvis calcification. Nonobstructing right renal calculus. No findings of right-sided hydronephrosis. Slightly delayed right nephrogram compared to the contralateral side with perinephric stranding. No hydronephrosis or calculi of the left kidney. Seen by urology - no hydronephrosis on CT and patient currently asymptomatic. No need for emergent surgical intervention this admission. Recommend outpatient follow to discuss definitive stone treatment given size of calculi - f/u with Dr. Ceron's group.    AZUL on admission quickly improved, Cr back to baseline. Resume Losartan-HCTZ on d/c. F/u with PCP for repeat bmp.    Seen by endocrinology for uncontrolled DM2. Started on Lantus insulin,  price checked prior to d/c. $60 for 4 pens, pt is agreeable. Rx provided for diabetic supplies as below. F/u with Dr. Lemon as OP. Pharmacy not answering calls with attempt to price check Tresiba.       See above problem list for further details and for management of chronic medical issues.    On the day of discharge, the patient was deemed to be in stable condition to be discharged to home w/ home health.    Will need to follow up with PCP and specialist(s) as OP as outlined below.     Discharge planning took 35 minutes.    Exam on Day of Discharge  Temp:  [36.4 °C (97.6 °F)-37.2 °C (98.9 °F)] 37.2 °C (98.9 °F)  Heart Rate:  [71-86] 86  Resp:  [16-18] 16  BP: (152-184)/(82-96) 170/83    General: NAD  Skin: warm, dry  HEENT: Normocephalic, atraumatic  Cardiovascular: RRR  Pulmonary: no wheezing, on room air  Abdomen: non-tender to palpation  Extremities: R knee wrapped in dressing; no pitting edema of b/l LE  Musculoskeletal: no deformities  Neuro: alert and awake, oriented x3  Psychiatric: no agitation    DISCHARGE MEDICATIONS         Medication List        START taking these medications      acetaminophen 325 mg tablet  Commonly known as: TYLENOL  Take 2 tablets (650 mg total) by mouth every 6 (six) hours as needed for mild pain or moderate pain (mild pain). Tylenol and Acetaminophen are the same medication. Do not exceed more than 3000mg of Tylenol in a day as this can lead to liver injury.  Dose: 650 mg     apixaban 5 mg tablet  Commonly known as: ELIQUIS  Take 1 tablet (5 mg total) by mouth 2 (two) times a day.  Dose: 5 mg     cephalexin 500 mg capsule  Commonly known as: KEFLEX  Take 2 capsules (1,000 mg total) by mouth 4 (four) times a day for 101 doses Indications: knee septic arthritis. Your last dose will be in the afternoon on 03/02/2025. Please get your BMP and CBC checked while on this medication, please discuss need for labs with your primary care provider.  Dose: 1,000 mg     dilTIAZem  mg 24  "hr capsule  Commonly known as: CARDIZEM CD  Start taking on: February 6, 2025  Take 1 capsule (120 mg total) by mouth daily. Hold for systolic blood pressure less than 100mmHg or heart rate less than 60 beats per minute.  Dose: 120 mg     ferrous sulfate 325 mg (65 mg iron) tablet  Start taking on: February 6, 2025  Take 1 tablet (325 mg total) by mouth daily with breakfast.  Dose: 325 mg     insulin glargine U-100 100 unit/mL (3 mL) pen  Commonly known as: LANTUS SOLOSTAR U-100 INSULIN  Inject 30 Units under the skin daily.  Dose: 30 Units     * miscellaneous medical supply misc  Lancets (to be dispensed per insurance formulary)  Use to prick your finger to check blood glucose after disinfecting your finger with alcohol pad. Use as directed.  Dispense: 100 lancets  Dx: E11.9     * miscellaneous medical supply misc  Glucometer test strips (to be dispensed per insurance formulary)  Use as directed with your glucometer to check your blood glucose up to 3 times per day before meals or as needed.  Dx: E11.9     * miscellaneous medical supply misc  Glucometer (to be dispensed per insurance formulary)  Use as directed to check your blood glucose  up to three times a day before meals or as needed.  Dispense: 1 glucometer kit  Dx: E11.9     * miscellaneous medical supply misc  BD ULTRA-FINE SHORT PEN NEEDLE 31 gauge x 5/16\" needle  Insulin pen needles, 5mm, 31 gauge  Use as directed to inject insulin subcutaneously.  Dispense: 100 pen needles  Dx: E11.9     oxyCODONE 5 mg immediate release tablet  Commonly known as: ROXICODONE  Take 1 tablet (5 mg total) by mouth every 6 (six) hours as needed for severe pain.  Dose: 5 mg     repaglinide 2 mg tablet  Commonly known as: PRANDIN  Take 1 tablet (2 mg total) by mouth 3 (three) times a day before meals.  Dose: 2 mg           * This list has 4 medication(s) that are the same as other medications prescribed for you. Read the directions carefully, and ask your doctor or other care " provider to review them with you.                CONTINUE taking these medications      CHOLECALCIFEROL (VITAMIN D3) ORAL  Take 1 tablet by mouth daily.  Dose: 1 tablet     FRUIT AND VEGETABLE DAILY 5-6-150 mg capsule  Take 2 capsules by mouth daily.  Dose: 2 capsule  Generic drug: lycopene-lutein-fruit extract     HYZAAR 50-12.5 mg per tablet  Take 2 tablets by mouth daily.  Dose: 2 tablet  Generic drug: losartan-hydrochlorothiazide     loperamide 2 mg capsule  Commonly known as: IMODIUM  Take 2 mg by mouth 4 (four) times a day as needed for diarrhea.  Dose: 2 mg     METAMUCIL (WITH SUGAR) 3 gram/7 gram powder  Take 1 Dose by mouth daily. 1 teaspoonful in 4 oz  Dose: 1 Dose  Generic drug: psyllium husk (with sugar)     metFORMIN 1,000 mg tablet  Commonly known as: GLUCOPHAGE  Take 1,000 mg by mouth 2 (two) times a day with meals.  Dose: 1,000 mg     PROBIOTIC ORAL  Take 1 capsule by mouth daily.  Dose: 1 capsule            STOP taking these medications      ALEVE 220 mg capsule  Generic drug: naproxen sodium     amLODIPine 5 mg tablet  Commonly known as: NORVASC             INSTRUCTIONS AND FOLLOW-UP     Instructions for after discharge       Call provider for:      Please call your primary care provider for symptoms including, but not limited to: fevers (temps > 100.4F or 38.1C), chills, intractable nausea or vomiting, diarrhea, rash, shortness of breath, bleeding, pain, or if you have worsening of symptoms that brought you to the hospital. For EMERGENCY and SERIOUS health-related issues, you may need to go directly to the Emergency Room or call 911.    Discharge Diet      Diet Type / Texture:  Regular  Diabetic, No Concentrated Sweets  Diabetic (Low Carb/Low Fat)  Cardiac (Low Sodium, Low Fat)       Follow-up with Provider:      FOLLOW UP IN 2 WEEKS, CALL FOR APPOINTMENT TO CONFIRM TIME/DATE/LOCATION    Star Osuna MD   [phone]    [facility]  [place] [id number]       Follow-up with Provider:      Call to  "schedule follow up appointment in 2-4 weeks with endocrinology    Pop Lemon MD   [phone]    [facility]  [address]  [address]  [place] [address]       Follow-up with Provider:      Call to schedule follow up appointment in 2-4 weeks with urology.    Dre Ceron MD   [phone]    [address]  [place] [id number]       Follow-up with Provider:      Call to schedule follow up appointment in 2-4 weeks with cardiology.    Scott Soliz DO   [phone]    [address] [facility] [address]  [place] [id number]       Follow-up with primary physician (PCP)      Call to schedule hospital follow-up appointment within 1 week from discharge. Please discuss need for labs (check BMP, CBC while on Keflex) as outpatient and changes to medications.    Post-Discharge Activity (Specify Additional Restrictions in Comments)      Additional Activity Restrictions: Please avoid high risk activities for falls, injury or trauma as you are on anticoagulation.  Please follow instructions by orthopedic team.    Review additional discharge directions in \"Instructions\" section on the last page              Important Issues to Address in Follow-Up  F/u with PCP and specialists as above in \"instructions for after discharge\"  Repeat labs as above in \"instructions for after discharge\"  Discharge Follow-up Issues: Instructions to Patient:   Additional Discharge Instructions    ORTHOPEDIC RECOMMENDATIONS     Right septic knee     2/1/2025, Right knee arthroscopic irrigation and debridement  (Dr. Star Osuna)     Weight bearing as tolerated     Follow up in 2 weeks with [facility]- Dr. Star Osuna, call [phone]       Deep Vein Thrombosis Prophylaxis:  -Continue with ELIQUIS as prescribed      Constipation/ Pain Medication:  -  Ice packs can be applied to areas of swelling for 10-15 minutes every 3-4 hours  - Start out taking the pain medication as prescribed, as your pain " starts to subside,          decrease the dosage or time interval between the pain pills.   - Take your pain medication with food to prevent nausea  - Do not drive while taking pain medications.   - Pain medications may cause constipation.   - Drink plenty of fluids and eat fresh fruits and vegetables.  - Please take over the counter Senokot S 1-2 tablet(s) by mouth daily as needed for constipation.  - If you have not had a bowel movement in three days please call the office.  - DO NOT SMOKE! Smoking is not healthy for your healing process.      Incision:  - You may take off your dressing and leave your incision open to air. May wash wash with soap & water.   - However, if there is any drainage please keep covered with gauze and tape.  - If the drainage continues longer than 2 days please call the office  - Do not place any ointments, creams, or lotions on your incision   - Please keep your incision(s) clean and dry  - Make sure your incision(s) are checked at least twice daily for signs and symptoms of infection.      If any of the below should occur, please call the office:  - Drainage from incision site  - Opening of incisions   - Fevers greater than 101  - Flu-like symptoms  - Increased redness and/or tenderness     Please call office or go to emergency department if :  - Thigh/calf pain or swelling in legs  - Chest pain  - Chest congestion  - Problems with breathing.    ____________________________________________________________________________________    You were evaluated for the following in the hospital:     Right knee infection - please complete Keflex as prescribed and follow up with orthopedics. You will need labs checked while on Keflex. Please discuss with your primary care provider. Please follow up with your primary care provider or orthopedics for pain medication if you need to discuss refills.  Right renal pelvis calcification - you were seen by urology. Please follow up with urology as outpatient  for stone management.  Atrial fibrillation - you were started on Eliquis and Cardizem CD. Please follow up with cardiology.  Diabetes - you were started on LANTUS insulin once a day. Please follow up with endocrinology. Please take Prandin as prescribed.  Acute kidney injury - resolved. Your Creatinine is back to baseline. Avoid NSAIDS medications, such as Advil, Ibuprofen, Motrin, Aleve, Naproxen, Celebrex, Toradol, Mobic. These medications can worsen your kidney function.    Please follow up with your primary care provider. Please call for an appointment and specify that it is for a hospital follow-up.    Please get the following lab work done after discharge - please ask your primary care provider for the lab prescriptions:     Check BMP, CBC while on Keflex.     Follow up with the recommended specialists after discharge. Please call their offices for an appointment if you do not have one already scheduled.    Please review your medication reconciliation closely as your medications may have changed.      Please call your primary care provider for symptoms including, but not limited to: fevers (temps > 100.4F or 38.1C), chills, intractable nausea or vomiting, diarrhea, rash, shortness of breath, bleeding, pain, or if you have worsening of symptoms that brought you to the hospital. For EMERGENCY and SERIOUS health-related issues, you may need to go directly to the Emergency Room or call 911.      Please bring a copy of your after visit summary and discharge instructions to your follow-up appointments.     It was a pleasure taking care of you!  ___________________________________________           Scheduled Appointments            In 1 month Leland Fishman MD [facility] in [facility]            Recommended Follow-up Visits  Follow-Up After Discharge       Star Osuna MD   Specialty: Orthopedic Surgery   Relationship: Consulting Physician        Follow up    Pop Lemon MD    Specialty: Endocrinology, Internal Medicine   Relationship: Consulting Physician        Follow up    Dre Ceron MD   Specialty: Urology   Relationship: Consulting Physician        Follow up    Scott Soliz DO   Specialty: Cardiology, Cardiovascular Disease   Relationship: Consulting Physician        Follow up    Leland Fishman MD   Specialty: Family Medicine   Relationship: PCP - General        Follow up            Test Results Pending at Discharge  Pending Inpatient Labs       Order Current Status    Urine culture Urine, Clean Catch In process    Anaerobic Culture / Smear (includes Aerobic Culture) Knee, Right Preliminary result            LABS AND IMAGING   Pertinent Labs  CHEMISTRIES   Results from last 7 days   Lab Units 02/05/25  0544 02/04/25  0556 02/03/25  0559 02/02/25  0544 02/01/25  0411   SODIUM mEQ/L 136 136 136 136 135*   POTASSIUM mEQ/L 3.8 3.8 3.8 3.6 3.1*   CHLORIDE mEQ/L 99 100 100 97* 95*   CO2 mEQ/L 31 28 29 30 30   BUN mg/dL 10 12 18 27* 28*   CREATININE mg/dL 0.7 0.8 0.9 1.1 1.5*   GLUCOSE mg/dL 186* 204* 251* 216* 247*   CALCIUM mg/dL 9.0 8.8 9.2 9.1 9.5   EGFR mL/min/1.73m*2 >60.0 >60.0 >60.0 >60.0 53.0*   ANION GAP mEQ/L 6 8 7 9 10     HEPATIC FUNCTION TESTS   Results from last 7 days   Lab Units 01/31/25  1754   AST IU/L 16   ALT IU/L 10   ALK PHOS IU/L 71   ALBUMIN g/dL 3.2*  3.2*   PROTEIN TOTAL g/dL 8.4*   BILIRUBIN TOTAL mg/dL 1.6*     CBC RESULTS   Results from last 7 days   Lab Units 02/05/25  0543 02/04/25  0556 02/03/25  0559 02/02/25  0544 02/01/25  0411   WBC K/uL 8.24 9.73 9.70 12.63* 14.97*   HEMOGLOBIN g/dL 10.8* 9.9* 10.0* 10.4* 11.7*   HEMATOCRIT % 32.9* 30.1* 30.0* 32.3* 35.1*   PLATELETS K/uL 349 358* 306 329 358*     ANEMIA STUDIES   Results from last 7 days   Lab Units 02/03/25  0559   IRON ug/dL 20*   TIBC ug/dL 165*   FERRITIN ng/mL 755*     COAGULATION   Results from last 7 days   Lab Units 02/01/25  0411 01/31/25  2112   PROTIME sec  --  16.3*    INR   --  1.3   PTT sec 31 27     ENDOCRINE STUDIES   Lab Results   Component Value Date    TSH 0.77 02/01/2025    HGBA1C 9.6 (H) 02/01/2025       CARDIAC   Results from last 7 days   Lab Units 02/01/25  0411 01/31/25  2152   HIGH SENSITIVE TROPONIN I pg/mL 12.9 15.6*     MICROBIOLOGY   Microbiology Results       Procedure Component Value Units Date/Time    Anaerobic Culture / Smear (includes Aerobic Culture) Knee, Right [[id number]]  (Abnormal) Collected: 02/01/25 1246    Specimen: Tissue from Knee, Right Updated: 02/05/25 0914     Culture **Positive Culture**      3+ Streptococcus, beta hemolytic Group B     Gram Stain Result 4+ WBC      1+ Gram positive cocci    Blood Culture Blood, Venous [[id number]]  (Normal) Collected: 01/31/25 2101    Specimen: Blood, Venous Updated: 02/04/25 2300     Culture No growth at 96 hours    Blood Culture Blood, Venous [[id number]]  (Normal) Collected: 01/31/25 2101    Specimen: Blood, Venous Updated: 02/04/25 2300     Culture No growth at 96 hours            Pertinent Imaging  CT ABDOMEN PELVIS WITH IV CONTRAST    Result Date: 2/3/2025  IMPRESSION:  Partially obstructing right renal pelvis calcification. Nonobstructing right renal calculus. No findings of right-sided hydronephrosis. Slightly delayed right nephrogram compared to the contralateral side with perinephric stranding. Correlate clinically and with urinalysis to assess for possible urinary tract infection. No hydronephrosis or calculi of the left kidney. No GI obstruction. Colonic fecal retention. Surgical anastomosis of the rectosigmoid colon with presumed postsurgical change/posttreatment change within the pelvis. Please see above for complete details.       OTHER SERVICES PROVIDED   Consults During Admission  IP CONSULT TO INFECTIOUS DISEASE  IP CONSULT TO ORTHOPEDIC SURGERY  IP CONSULT TO CARDIOLOGY  IP CONSULT TO ORTHOPEDIC SURGERY  IP CONSULT TO NUTRITION SERVICES  IP CONSULT TO ENDOCRINOLOGY  IP CONSULT TO  UROLOGY    Procedures Performed  2/1/25 OR with Dr. Star Osuna - Right knee arthroscopic irrigation and debridement       Thank you for allowing the Division of Hospital Medicine to care for your patient.        >30 mins spent for coordination/counselling related to discharge plan, including final examination of patient, discussion regarding discharge instructions, reconciliation/prescription of medications, preparation of discharge records, etc.

## 2025-02-05 NOTE — PLAN OF CARE
Care Coordination Discharge Plan Summary    Admission Assessment Summary    General Information  Readmission Within the last 30 days: no previous admission in last 30 days  Does patient have a : No  Patient-Specific Goals (include timeframe): to get better    Living Arrangements  Arrived From: home  Current Living Arrangements: home  People in Home: spouse, child(beni), adult  Home Accessibility: not wheelchair accessible  Living Arrangement Comments: Patient lives in a multi-story home with spouse and 18 year old son.  Independent at home.    Social Drivers of Health - Screenings  Housing Stability  In the last 12 months, was there a time when you were not able to pay the mortgage or rent on time?: No  In the past 12 months, how many times have you moved where you were living?: 0  At any time in the past 12 months, were you homeless or living in a shelter (including now)?: No  Utility Access  In the past 12 months has the electric, gas, oil, or water company threatened to shut off services in your home?: No  Transportation Needs  In the past 12 months, has lack of transportation kept you from medical appointments or from getting medications?: No  In the past 12 months, has lack of transportation kept you from meetings, work, or from getting things needed for daily living?: No    Functional Status Prior to Admission  Assistive Device/Animal Currently Used at Home: cane, straight  Functional Status Comments: Independent at home  IADL Comments: Independent    Discharge Needs Assessment    Concerns to be Addressed: no discharge needs identified  Current Discharge Risk:    Anticipated Changes Related to Illness: none    Discharge Plan Summary    Patient Choice  Offered/Gave Vendor List: no (not applicable)       Concerns / Comments: met pt several times today the plan is home, he declined services, will follow up with PCP. provdied the DM education class schedule, the eliquis if $10/m with the copay card  spouse, jose mariap the meds last evening, it was determiend he will need Lantus insulin the cost is $60/box ( 4 pens) DM supplies and glucometer scripts gven to the pt, pharmacy asked that he provides the paper script and them, they will provide the supplies, therapy provided the pt with a walker. gave a Good Rx card to the pt in the event he can use to generic meds to bring the cost down.  Pt will see ortho as OP they will handle the dressing.    Discharge Plan:  Disposition/Destination: Home  / Home       Connection to Community  Not applicable  Community Resources:  n/a    Discharge Transportation:  Is Out of Hospital DNR needed at Discharge: no  Does patient need discharge transport?

## 2025-02-05 NOTE — PLAN OF CARE
Plan of Care Review  Plan of Care Reviewed With: patient  Progress: improving  Outcome Evaluation: Pt AAOx4. Tolerated all meds, abx admin per order w/ no adverse reactions noted. Pt up in chair, chair alarm and bed alarm on. call mathews within reach and educated to use for assistance. New order to discharge patient. Nurse provided insulin teaching and pt demonstrated understanding.

## 2025-02-06 ENCOUNTER — TELEPHONE (OUTPATIENT)
Dept: SURGERY | Facility: CLINIC | Age: 60
End: 2025-02-06
Payer: COMMERCIAL

## 2025-02-06 LAB
GRAM STN SPEC: ABNORMAL
GRAM STN SPEC: ABNORMAL
MICROORGANISM SPEC CULT: ABNORMAL

## 2025-02-06 NOTE — TELEPHONE ENCOUNTER
Spoke with pt, informing him Colonoscopy shows diverticulosis, there was 1 polyp removed that was a TA. Good fiber/hydration intake is encouraged. Repeat colonoscopy in 1-2 years and will reach out in the mean time if needed.

## 2025-02-10 ENCOUNTER — TELEPHONE (OUTPATIENT)
Dept: SURGERY | Facility: CLINIC | Age: 60
End: 2025-02-10
Payer: COMMERCIAL

## 2025-02-10 NOTE — TELEPHONE ENCOUNTER
Patient called back with further questions in regards to his colonoscopy. I reviewed path and explained it was same as the pathology from the scope in 2022. He is aware we will continue to monitor closely. He asked if there was hemorrhoids present at this time.I informed him there was not but anal excoriation was noted. I advised him to keep the area clean and dry and he can use Boqueron cream on the skin to relieve when flared. He is advised to call the office back with any further questions.

## 2025-02-14 ENCOUNTER — OFFICE VISIT (OUTPATIENT)
Dept: PRIMARY CARE | Facility: CLINIC | Age: 60
End: 2025-02-14
Payer: COMMERCIAL

## 2025-02-14 VITALS
OXYGEN SATURATION: 98 % | TEMPERATURE: 97.3 F | RESPIRATION RATE: 16 BRPM | DIASTOLIC BLOOD PRESSURE: 78 MMHG | HEART RATE: 92 BPM | SYSTOLIC BLOOD PRESSURE: 122 MMHG

## 2025-02-14 DIAGNOSIS — N20.0 KIDNEY STONE: ICD-10-CM

## 2025-02-14 DIAGNOSIS — Z79.4 TYPE 2 DIABETES MELLITUS WITH HYPERGLYCEMIA, WITH LONG-TERM CURRENT USE OF INSULIN (CMS/HCC): ICD-10-CM

## 2025-02-14 DIAGNOSIS — M00.261 STREPTOCOCCAL ARTHRITIS OF RIGHT KNEE (CMS/HCC): Primary | ICD-10-CM

## 2025-02-14 DIAGNOSIS — C20 RECTAL CANCER (CMS/HCC): ICD-10-CM

## 2025-02-14 DIAGNOSIS — I48.91 NEW ONSET ATRIAL FIBRILLATION (CMS/HCC): ICD-10-CM

## 2025-02-14 DIAGNOSIS — E11.65 TYPE 2 DIABETES MELLITUS WITH HYPERGLYCEMIA, WITH LONG-TERM CURRENT USE OF INSULIN (CMS/HCC): ICD-10-CM

## 2025-02-14 PROCEDURE — 1111F DSCHRG MED/CURRENT MED MERGE: CPT | Performed by: FAMILY MEDICINE

## 2025-02-14 PROCEDURE — 99495 TRANSJ CARE MGMT MOD F2F 14D: CPT | Performed by: FAMILY MEDICINE

## 2025-02-14 ASSESSMENT — ENCOUNTER SYMPTOMS
SORE THROAT: 0
BLOOD IN STOOL: 0
JOINT SWELLING: 1
VOMITING: 0
WEAKNESS: 0
POLYDIPSIA: 0
STRIDOR: 0
HEADACHES: 0
DYSPHORIC MOOD: 0
NAUSEA: 0
DIZZINESS: 0
NUMBNESS: 0
SHORTNESS OF BREATH: 0
DYSURIA: 0
WHEEZING: 0
COUGH: 0
HEMATURIA: 0
CONSTIPATION: 0
ARTHRALGIAS: 1
CONFUSION: 0
POLYPHAGIA: 0
CHILLS: 0
PALPITATIONS: 0
DIARRHEA: 0
FEVER: 0
ABDOMINAL PAIN: 0

## 2025-02-14 NOTE — PROGRESS NOTES
Subjective      Patient ID: Lake Martin is a 59 y.o. male.  1965      HPI  HPI:   Lake Martin is a 59 y.o. male presenting for follow-up after hospital discharge.    Discharge Diagnosis: noted  Discharging Facility: noted  Date of Admission: noted  Date of Discharge: noted     Summary of Hospital Course:noted    Medications prescribed at discharge reconciled with current ambulatory medication list:  Yes.    Inpatient testing (labs, imaging, cardiovascular) requiring follow-up: noted     History since hospital discharge: noted      Advance Care Planning Documents       Document Type Status Effective Date Expiration Date Received On Description    Advance Directives and Living Will Not Received        Power of  Not Received                The following have been reviewed and updated as appropriate in this visit:   Problems       Review of Systems   Constitutional:  Negative for chills and fever.   HENT:  Negative for dental problem, hearing loss and sore throat.    Eyes:  Negative for visual disturbance.   Respiratory:  Negative for cough, shortness of breath, wheezing and stridor.    Cardiovascular:  Negative for chest pain, palpitations and leg swelling.   Gastrointestinal:  Negative for abdominal pain, blood in stool, constipation, diarrhea, nausea and vomiting.   Endocrine: Negative for cold intolerance, heat intolerance, polydipsia, polyphagia and polyuria.   Genitourinary:  Negative for dysuria, hematuria, penile discharge, penile pain, penile swelling, scrotal swelling and testicular pain.   Musculoskeletal:  Positive for arthralgias, gait problem and joint swelling.   Skin:  Negative for rash.   Neurological:  Negative for dizziness, syncope, weakness, numbness and headaches.   Psychiatric/Behavioral:  Negative for confusion and dysphoric mood.    All other systems reviewed and are negative.      Objective     Vitals:    02/14/25 1138   BP: 122/78   BP Location: Left upper arm    Patient Position: Sitting   Pulse: 92   Resp: 16   Temp: 36.3 °C (97.3 °F)   TempSrc: Temporal   SpO2: 98%     There is no height or weight on file to calculate BMI.    Physical Exam  Vitals and nursing note reviewed.   Constitutional:       Appearance: Normal appearance.   Eyes:      General: No scleral icterus.  Cardiovascular:      Rate and Rhythm: Normal rate and regular rhythm.      Heart sounds: Normal heart sounds.   Pulmonary:      Effort: Pulmonary effort is normal.      Breath sounds: Normal breath sounds.   Musculoskeletal:      Right lower leg: No edema.      Left lower leg: No edema.      Comments: R knee swollen  Incisions c/d/i   Skin:     General: Skin is warm and dry.      Findings: No erythema or rash.   Neurological:      General: No focal deficit present.      Mental Status: He is alert.   Psychiatric:         Mood and Affect: Mood normal.         Behavior: Behavior normal.         Assessment & Plan  Streptococcal arthritis of right knee (CMS/[facility])  Ortho, PT following  On keflex       New onset atrial fibrillation (CMS/[facility])  Nsr now  Same tx  Card f/u       Kidney stone  Urol f/u, nonurgent       Type 2 diabetes mellitus with hyperglycemia, with long-term current use of insulin (CMS/[facility])  Tx reviewed  Monitor bs       Rectal cancer (CMS/[facility])

## 2025-02-19 ENCOUNTER — TELEPHONE (OUTPATIENT)
Dept: PRIMARY CARE | Facility: CLINIC | Age: 60
End: 2025-02-19
Payer: COMMERCIAL

## 2025-02-19 NOTE — TELEPHONE ENCOUNTER
Pt left a voicemail that is low blood pressure is doing better but when he stands or remains standing he gets lightheaded. He would like to have a nurse contact him to discuss.

## 2025-02-19 NOTE — TELEPHONE ENCOUNTER
/81 during day today     today      Pr urine was a bit darker over lst day - Wife did increase fluids over today - RN did advise to increase fluids    RN educated about parameters for Cardizem, PT understands to HOLD for SBP and HR  - PT will HLD losartan/hctz for one more day then care decrease to 1 tab daily

## 2025-02-24 RX ORDER — METFORMIN HYDROCHLORIDE 1000 MG/1
1000 TABLET ORAL 2 TIMES DAILY WITH MEALS
Qty: 180 TABLET | Refills: 0 | Status: SHIPPED | OUTPATIENT
Start: 2025-02-24

## 2025-02-24 NOTE — TELEPHONE ENCOUNTER
Medicine Refill Request    Last Office Visit: 2/14/2025   Last Consult Visit: 12/17/2021  Last Telemedicine Visit: Visit date not found    Next Appointment: 3/14/2025      Current Outpatient Medications:     acetaminophen (TYLENOL) 325 mg tablet, Take 2 tablets (650 mg total) by mouth every 6 (six) hours as needed for mild pain or moderate pain (mild pain). Tylenol and Acetaminophen are the same medication. Do not exceed more than 3000mg of Tylenol in a day as this can lead to liver injury., Disp: , Rfl:     apixaban (ELIQUIS) 5 mg tablet, Take 1 tablet (5 mg total) by mouth 2 (two) times a day., Disp: 60 tablet, Rfl: 0    cephalexin (KEFLEX) 500 mg capsule, Take 2 capsules (1,000 mg total) by mouth 4 (four) times a day for 101 doses Indications: knee septic arthritis. Your last dose will be in the afternoon on 03/02/2025. Please get your BMP and CBC checked while on this medication, please discuss need for labs with your primary care provider., Disp: 202 capsule, Rfl: 0    CHOLECALCIFEROL, VITAMIN D3, ORAL, Take 1 tablet by mouth daily., Disp: , Rfl:     dilTIAZem CD (CARDIZEM CD) 120 mg 24 hr capsule, Take 1 capsule (120 mg total) by mouth daily. Hold for systolic blood pressure less than 100mmHg or heart rate less than 60 beats per minute., Disp: 30 capsule, Rfl: 0    ferrous sulfate 325 mg (65 mg iron) tablet, Take 1 tablet (325 mg total) by mouth daily with breakfast., Disp: 30 tablet, Rfl: 0    insulin glargine U-100 (LANTUS SOLOSTAR U-100 INSULIN) 100 unit/mL (3 mL) pen, Inject 30 Units under the skin daily., Disp: 15 mL, Rfl: 0    Lactobacillus acidophilus (PROBIOTIC ORAL), Take 1 capsule by mouth daily., Disp: , Rfl:     loperamide (IMODIUM) 2 mg capsule, Take 2 mg by mouth 4 (four) times a day as needed for diarrhea., Disp: , Rfl:     losartan-hydrochlorothiazide (HYZAAR) 50-12.5 mg per tablet, Take 2 tablets by mouth daily., Disp: , Rfl:     lycopene-lutein-fruit extract (FRUIT AND VEGETABLE DAILY)  "5-6-150 mg capsule, Take 2 capsules by mouth daily., Disp: , Rfl:     metFORMIN (GLUCOPHAGE) 1,000 mg tablet, Take 1,000 mg by mouth 2 (two) times a day with meals., Disp: , Rfl:     miscellaneous medical supply misc, Lancets (to be dispensed per insurance formulary) Use to prick your finger to check blood glucose after disinfecting your finger with alcohol pad. Use as directed. Dispense: 100 lancets Dx: E11.9, Disp: 100 each, Rfl: 0    miscellaneous medical supply misc, Glucometer test strips (to be dispensed per insurance formulary) Use as directed with your glucometer to check your blood glucose up to 3 times per day before meals or as needed. Dx: E11.9, Disp: 100 each, Rfl: 0    miscellaneous medical supply misc, Glucometer (to be dispensed per insurance formulary) Use as directed to check your blood glucose  up to three times a day before meals or as needed. Dispense: 1 glucometer kit Dx: E11.9, Disp: 1 each, Rfl: 0    miscellaneous medical supply misc, BD ULTRA-FINE SHORT PEN NEEDLE 31 gauge x 5/16\" needle Insulin pen needles, 5mm, 31 gauge Use as directed to inject insulin subcutaneously. Dispense: 100 pen needles Dx: E11.9, Disp: 100 each, Rfl: 0    oxyCODONE (ROXICODONE) 5 mg immediate release tablet, Take 1 tablet (5 mg total) by mouth every 6 (six) hours as needed for severe pain., Disp: 12 tablet, Rfl: 0    psyllium husk, with sugar, (METAMUCIL, WITH SUGAR,) 3 gram/7 gram powder, Take 1 Dose by mouth daily. 1 teaspoonful in 4 oz, Disp: , Rfl:     repaglinide (PRANDIN) 2 mg tablet, Take 1 tablet (2 mg total) by mouth 3 (three) times a day before meals., Disp: 90 tablet, Rfl: 0      BP Readings from Last 3 Encounters:   02/14/25 122/78   02/05/25 (!) 170/83   12/06/24 (!) 138/92       Recent Lab results:  Lab Results   Component Value Date    CHOL 217 (H) 08/02/2023   ,   Lab Results   Component Value Date    HDL 53 08/02/2023   ,   Lab Results   Component Value Date    LDLCALC 133 (H) 08/02/2023   ,   Lab " Results   Component Value Date    TRIG 172 (H) 08/02/2023        Lab Results   Component Value Date    GLUCOSE 186 (H) 02/05/2025   ,   Lab Results   Component Value Date    HGBA1C 9.6 (H) 02/01/2025         Lab Results   Component Value Date    CREATININE 0.7 02/05/2025       Lab Results   Component Value Date    TSH 0.77 02/01/2025           Lab Results   Component Value Date    HGBA1C 9.6 (H) 02/01/2025       Lab Results   Component Value Date    EGFR >60.0 02/05/2025    EGFR >60.0 02/04/2025    EGFR >60.0 02/03/2025

## 2025-02-28 ENCOUNTER — PATIENT MESSAGE (OUTPATIENT)
Dept: PRIMARY CARE | Facility: CLINIC | Age: 60
End: 2025-02-28
Payer: COMMERCIAL

## 2025-02-28 RX ORDER — DILTIAZEM HYDROCHLORIDE 120 MG/1
120 CAPSULE, COATED, EXTENDED RELEASE ORAL DAILY
Qty: 90 CAPSULE | Refills: 1 | Status: SHIPPED | OUTPATIENT
Start: 2025-02-28 | End: 2025-03-30

## 2025-02-28 RX ORDER — REPAGLINIDE 2 MG/1
2 TABLET ORAL
Qty: 270 TABLET | Refills: 0 | Status: SHIPPED | OUTPATIENT
Start: 2025-02-28 | End: 2025-03-30

## 2025-03-03 DIAGNOSIS — Z79.4 TYPE 2 DIABETES MELLITUS WITH HYPERGLYCEMIA, WITH LONG-TERM CURRENT USE OF INSULIN (CMS/HCC): Primary | ICD-10-CM

## 2025-03-03 DIAGNOSIS — E11.65 TYPE 2 DIABETES MELLITUS WITH HYPERGLYCEMIA, WITH LONG-TERM CURRENT USE OF INSULIN (CMS/HCC): Primary | ICD-10-CM

## 2025-03-03 RX ORDER — LANCETS
EACH MISCELLANEOUS
Qty: 100 EACH | Refills: 3 | Status: SHIPPED | OUTPATIENT
Start: 2025-03-03 | End: 2025-03-12

## 2025-03-03 RX ORDER — BLOOD SUGAR DIAGNOSTIC
STRIP MISCELLANEOUS
Qty: 100 STRIP | Refills: 3 | Status: SHIPPED | OUTPATIENT
Start: 2025-03-03 | End: 2025-03-12

## 2025-03-11 ENCOUNTER — TELEPHONE (OUTPATIENT)
Dept: PRIMARY CARE | Facility: CLINIC | Age: 60
End: 2025-03-11
Payer: COMMERCIAL

## 2025-03-11 NOTE — TELEPHONE ENCOUNTER
Pt states he was having some issues with his BP and Dr Fishman advise that he decrease losartan to once daily instead of BID, pt states his BP is starting to increase again and is asking if he should increase losartan back to BID, pt BP has been running around 140/150 / 90. Please advise recommendations

## 2025-03-12 RX ORDER — CALCIUM CITRATE/VITAMIN D3 200MG-6.25
TABLET ORAL
Qty: 100 STRIP | Refills: 3 | Status: SHIPPED | OUTPATIENT
Start: 2025-03-12 | End: 2025-03-27

## 2025-03-12 RX ORDER — CALCIUM CITRATE/VITAMIN D3 200MG-6.25
TABLET ORAL
Status: CANCELLED | OUTPATIENT
Start: 2025-03-12

## 2025-03-12 RX ORDER — CALCIUM CITRATE/VITAMIN D3 200MG-6.25
TABLET ORAL
COMMUNITY
End: 2025-03-12 | Stop reason: SDUPTHER

## 2025-03-12 NOTE — TELEPHONE ENCOUNTER
Medicine Refill Request    Last Office Visit: 2/14/2025   Last Consult Visit: 12/17/2021  Last Telemedicine Visit: Visit date not found    Next Appointment: 3/14/2025      Current Outpatient Medications:     blood sugar diagnostic (TRUE METRIX GLUCOSE TEST STRIP) strip, , Disp: , Rfl:     apixaban (ELIQUIS) 5 mg tablet, Take 1 tablet (5 mg total) by mouth 2 (two) times a day., Disp: 180 tablet, Rfl: 0    blood sugar diagnostic (ACCU-CHEK JACQUES PLUS TEST STRP) strip, Test daily, Disp: 100 strip, Rfl: 3    CHOLECALCIFEROL, VITAMIN D3, ORAL, Take 1 tablet by mouth daily., Disp: , Rfl:     dilTIAZem CD (CARDIZEM CD) 120 mg 24 hr capsule, Take 1 capsule (120 mg total) by mouth daily. Hold for systolic blood pressure less than 100mmHg or heart rate less than 60 beats per minute., Disp: 90 capsule, Rfl: 1    ferrous sulfate 325 mg (65 mg iron) tablet, Take 1 tablet (325 mg total) by mouth daily with breakfast., Disp: 30 tablet, Rfl: 0    insulin glargine U-100 (LANTUS SOLOSTAR U-100 INSULIN) 100 unit/mL (3 mL) pen, Inject 30 Units under the skin daily., Disp: 15 mL, Rfl: 0    Lactobacillus acidophilus (PROBIOTIC ORAL), Take 1 capsule by mouth daily., Disp: , Rfl:     lancets (ACCU-CHEK SOFTCLIX LANCETS) misc, Test daily, Disp: 100 each, Rfl: 3    loperamide (IMODIUM) 2 mg capsule, Take 2 mg by mouth 4 (four) times a day as needed for diarrhea., Disp: , Rfl:     losartan-hydrochlorothiazide (HYZAAR) 50-12.5 mg per tablet, Take 2 tablets by mouth daily., Disp: , Rfl:     lycopene-lutein-fruit extract (FRUIT AND VEGETABLE DAILY) 5-6-150 mg capsule, Take 2 capsules by mouth daily., Disp: , Rfl:     metFORMIN (GLUCOPHAGE) 1,000 mg tablet, TAKE 1 TABLET BY MOUTH TWICE A DAY WITH FOOD, Disp: 180 tablet, Rfl: 0    miscellaneous medical supply misc, Lancets (to be dispensed per insurance formulary) Use to prick your finger to check blood glucose after disinfecting your finger with alcohol pad. Use as directed. Dispense: 100 lancets  "Dx: E11.9, Disp: 100 each, Rfl: 0    miscellaneous medical supply misc, Glucometer test strips (to be dispensed per insurance formulary) Use as directed with your glucometer to check your blood glucose up to 3 times per day before meals or as needed. Dx: E11.9, Disp: 100 each, Rfl: 0    miscellaneous medical supply misc, Glucometer (to be dispensed per insurance formulary) Use as directed to check your blood glucose  up to three times a day before meals or as needed. Dispense: 1 glucometer kit Dx: E11.9, Disp: 1 each, Rfl: 0    miscellaneous medical supply misc, BD ULTRA-FINE SHORT PEN NEEDLE 31 gauge x 5/16\" needle Insulin pen needles, 5mm, 31 gauge Use as directed to inject insulin subcutaneously. Dispense: 100 pen needles Dx: E11.9, Disp: 100 each, Rfl: 0    psyllium husk, with sugar, (METAMUCIL, WITH SUGAR,) 3 gram/7 gram powder, Take 1 Dose by mouth daily. 1 teaspoonful in 4 oz, Disp: , Rfl:     repaglinide (PRANDIN) 2 mg tablet, Take 1 tablet (2 mg total) by mouth 3 (three) times a day before meals., Disp: 270 tablet, Rfl: 0    BP Readings from Last 3 Encounters:   02/14/25 122/78   02/05/25 (!) 170/83   12/06/24 (!) 138/92       Recent Lab results:  Lab Results   Component Value Date    CHOL 217 (H) 08/02/2023   ,   Lab Results   Component Value Date    HDL 53 08/02/2023   ,   Lab Results   Component Value Date    LDLCALC 133 (H) 08/02/2023   ,   Lab Results   Component Value Date    TRIG 172 (H) 08/02/2023        Lab Results   Component Value Date    GLUCOSE 186 (H) 02/05/2025   ,   Lab Results   Component Value Date    HGBA1C 9.6 (H) 02/01/2025         Lab Results   Component Value Date    CREATININE 0.7 02/05/2025       Lab Results   Component Value Date    TSH 0.77 02/01/2025           Lab Results   Component Value Date    HGBA1C 9.6 (H) 02/01/2025     "

## 2025-03-14 ENCOUNTER — OFFICE VISIT (OUTPATIENT)
Dept: PRIMARY CARE | Facility: CLINIC | Age: 60
End: 2025-03-14
Payer: COMMERCIAL

## 2025-03-14 VITALS
SYSTOLIC BLOOD PRESSURE: 130 MMHG | RESPIRATION RATE: 16 BRPM | OXYGEN SATURATION: 97 % | TEMPERATURE: 98.1 F | HEART RATE: 93 BPM | DIASTOLIC BLOOD PRESSURE: 78 MMHG

## 2025-03-14 DIAGNOSIS — E11.65 TYPE 2 DIABETES MELLITUS WITH HYPERGLYCEMIA, WITH LONG-TERM CURRENT USE OF INSULIN (CMS/HCC): Primary | ICD-10-CM

## 2025-03-14 DIAGNOSIS — Z79.4 TYPE 2 DIABETES MELLITUS WITH HYPERGLYCEMIA, WITH LONG-TERM CURRENT USE OF INSULIN (CMS/HCC): Primary | ICD-10-CM

## 2025-03-14 DIAGNOSIS — M00.261 STREPTOCOCCAL ARTHRITIS OF RIGHT KNEE (CMS/HCC): ICD-10-CM

## 2025-03-14 PROBLEM — E87.6 HYPOKALEMIA: Status: RESOLVED | Noted: 2025-02-01 | Resolved: 2025-03-14

## 2025-03-14 PROCEDURE — 99213 OFFICE O/P EST LOW 20 MIN: CPT | Performed by: FAMILY MEDICINE

## 2025-03-14 RX ORDER — CEPHALEXIN 500 MG/1
CAPSULE ORAL
COMMUNITY
Start: 2025-03-05 | End: 2025-03-14

## 2025-03-14 ASSESSMENT — ENCOUNTER SYMPTOMS
SHORTNESS OF BREATH: 0
STRIDOR: 0
SORE THROAT: 0
NAUSEA: 0
WHEEZING: 0
POLYDIPSIA: 0
DIZZINESS: 0
JOINT SWELLING: 1
NUMBNESS: 0
PALPITATIONS: 0
VOMITING: 0
DIARRHEA: 0
WEAKNESS: 0
ABDOMINAL PAIN: 0
CONSTIPATION: 0
CHILLS: 0
COUGH: 0
DYSPHORIC MOOD: 0
FEVER: 0
DYSURIA: 0
HEMATURIA: 0
CONFUSION: 0
HEADACHES: 0
BLOOD IN STOOL: 0
POLYPHAGIA: 0

## 2025-03-14 NOTE — ASSESSMENT & PLAN NOTE
Home bs good  lab  Orders:    Comprehensive metabolic panel; Future    Hemoglobin A1c; Future    CBC; Future

## 2025-03-14 NOTE — PROGRESS NOTES
Subjective      Patient ID: Lake Martin is a 59 y.o. male.  1965      [facility]        The following have been reviewed and updated as appropriate in this visit:   Problems       Review of Systems   Constitutional:  Negative for chills and fever.   HENT:  Negative for dental problem, hearing loss and sore throat.    Eyes:  Negative for visual disturbance.   Respiratory:  Negative for cough, shortness of breath, wheezing and stridor.    Cardiovascular:  Negative for chest pain, palpitations and leg swelling.   Gastrointestinal:  Negative for abdominal pain, blood in stool, constipation, diarrhea, nausea and vomiting.   Endocrine: Negative for cold intolerance, heat intolerance, polydipsia, polyphagia and polyuria.   Genitourinary:  Negative for dysuria, hematuria, penile discharge, penile pain, penile swelling, scrotal swelling and testicular pain.   Musculoskeletal:  Positive for gait problem and joint swelling.   Skin:  Negative for rash.   Neurological:  Negative for dizziness, syncope, weakness, numbness and headaches.   Psychiatric/Behavioral:  Negative for confusion and dysphoric mood.    All other systems reviewed and are negative.      Objective     Vitals:    03/14/25 0820   BP: 128/78   BP Location: Left upper arm   Patient Position: Sitting   Pulse: 93   Resp: 16   Temp: 36.7 °C (98.1 °F)   TempSrc: Temporal   SpO2: 97%     There is no height or weight on file to calculate BMI.    Physical Exam  Vitals and nursing note reviewed.   Constitutional:       Appearance: Normal appearance.   Eyes:      General: No scleral icterus.  Cardiovascular:      Rate and Rhythm: Normal rate and regular rhythm.   Musculoskeletal:         General: Swelling present.      Right lower leg: No edema.      Left lower leg: No edema.      Comments: R knee   Skin:     General: Skin is warm and dry.      Findings: No rash.   Neurological:      General: No focal deficit present.      Mental Status: He is alert.   Psychiatric:          Mood and Affect: Mood normal.         Behavior: Behavior normal.         Assessment & Plan  Type 2 diabetes mellitus with hyperglycemia, with long-term current use of insulin (CMS/[facility])  Home bs good  lab  Orders:    Comprehensive metabolic panel; Future    Hemoglobin A1c; Future    CBC; Future    Streptococcal arthritis of right knee (CMS/[facility])  Improved,off abx, PT  Orders:    CBC; Future

## 2025-04-17 LAB — ERYTHROCYTE [SEDIMENTATION RATE] IN BLOOD BY WESTERGREN METHOD: 52 MM/HR (ref 0–30)

## 2025-04-18 LAB — CRP SERPL HS-MCNC: 9.32 MG/L (ref 0–3)

## 2025-05-16 RX ORDER — APIXABAN 5 MG/1
5 TABLET, FILM COATED ORAL 2 TIMES DAILY
Qty: 180 TABLET | Refills: 0 | Status: SHIPPED | OUTPATIENT
Start: 2025-05-16

## 2025-05-16 RX ORDER — REPAGLINIDE 2 MG/1
TABLET ORAL
Qty: 270 TABLET | Refills: 0 | Status: SHIPPED | OUTPATIENT
Start: 2025-05-16

## 2025-05-16 RX ORDER — METFORMIN HYDROCHLORIDE 1000 MG/1
1000 TABLET ORAL
Qty: 180 TABLET | Refills: 0 | Status: SHIPPED | OUTPATIENT
Start: 2025-05-16

## 2025-06-11 RX ORDER — LOSARTAN POTASSIUM AND HYDROCHLOROTHIAZIDE 12.5; 5 MG/1; MG/1
2 TABLET ORAL DAILY
Qty: 180 TABLET | Refills: 0 | Status: SHIPPED | OUTPATIENT
Start: 2025-06-11

## 2025-06-11 NOTE — TELEPHONE ENCOUNTER
Medicine Refill Request    Last Office Visit: 3/14/2025   Last Consult Visit: 12/17/2021  Last Telemedicine Visit: Visit date not found    Next Appointment: Visit date not found      Current Outpatient Medications:     blood sugar diagnostic (glucose blood) strip, 1 strip 3 (three) times a day as needed (hyper/hypoglycemia symptoms)., Disp: 100 strip, Rfl: 3    blood-glucose meter misc, 1 kit 3 (three) times a day as needed (Hypo/hyperglycemia symptoms)., Disp: 1 each, Rfl: 0    CHOLECALCIFEROL, VITAMIN D3, ORAL, Take 1 tablet by mouth daily., Disp: , Rfl:     dilTIAZem CD (CARDIZEM CD) 120 mg 24 hr capsule, Take 1 capsule (120 mg total) by mouth daily. Hold for systolic blood pressure less than 100mmHg or heart rate less than 60 beats per minute., Disp: 90 capsule, Rfl: 1    ELIQUIS 5 mg tablet, TAKE 1 TABLET BY MOUTH TWICE A DAY, Disp: 180 tablet, Rfl: 0    ferrous sulfate 325 mg (65 mg iron) tablet, Take 1 tablet (325 mg total) by mouth daily with breakfast., Disp: 30 tablet, Rfl: 0    Lactobacillus acidophilus (PROBIOTIC ORAL), Take 1 capsule by mouth daily., Disp: , Rfl:     lancets misc, 1 Lancet 3 (three) times a day as needed (hypo/hyperglycemia symptoms)., Disp: 100 each, Rfl: 0    loperamide (IMODIUM) 2 mg capsule, Take 2 mg by mouth 4 (four) times a day as needed for diarrhea., Disp: , Rfl:     losartan-hydrochlorothiazide (HYZAAR) 50-12.5 mg per tablet, Take 2 tablets by mouth daily., Disp: , Rfl:     lycopene-lutein-fruit extract (FRUIT AND VEGETABLE DAILY) 5-6-150 mg capsule, Take 2 capsules by mouth daily., Disp: , Rfl:     metFORMIN (GLUCOPHAGE) 1,000 mg tablet, TAKE 1 TABLET BY MOUTH TWICE A DAY WITH FOOD, Disp: 180 tablet, Rfl: 0    miscellaneous medical supply misc, Lancets (to be dispensed per insurance formulary) Use to prick your finger to check blood glucose after disinfecting your finger with alcohol pad. Use as directed. Dispense: 100 lancets Dx: E11.9, Disp: 100 each, Rfl: 0     "miscellaneous medical supply [facility], Glucometer (to be dispensed per insurance formulary) Use as directed to check your blood glucose  up to three times a day before meals or as needed. Dispense: 1 glucometer kit Dx: E11.9, Disp: 1 each, Rfl: 0    miscellaneous medical supply [facility], BD ULTRA-FINE SHORT PEN NEEDLE 31 gauge x 5/16\" needle Insulin pen needles, 5mm, 31 gauge Use as directed to inject insulin subcutaneously. Dispense: 100 pen needles Dx: E11.9, Disp: 100 each, Rfl: 0    psyllium husk, with sugar, (METAMUCIL, WITH SUGAR,) 3 gram/7 gram powder, Take 1 Dose by mouth daily. 1 teaspoonful in 4 oz, Disp: , Rfl:     repaglinide (PRANDIN) 2 mg tablet, TAKE 1 TABLET BY MOUTH 3 TIMES A DAY BEFORE MEALS., Disp: 270 tablet, Rfl: 0      BP Readings from Last 3 Encounters:   03/14/25 130/78   02/14/25 122/78   02/05/25 (!) 170/83       Recent Lab results:  Lab Results   Component Value Date    CHOL 217 (H) 08/02/2023   ,   Lab Results   Component Value Date    HDL 53 08/02/2023   ,   Lab Results   Component Value Date    LDLCALC 133 (H) 08/02/2023   ,   Lab Results   Component Value Date    TRIG 172 (H) 08/02/2023        Lab Results   Component Value Date    GLUCOSE 186 (H) 02/05/2025   ,   Lab Results   Component Value Date    HGBA1C 9.6 (H) 02/01/2025         Lab Results   Component Value Date    CREATININE 0.7 02/05/2025       Lab Results   Component Value Date    TSH 0.77 02/01/2025           Lab Results   Component Value Date    HGBA1C 9.6 (H) 02/01/2025       Lab Results   Component Value Date    EGFR >60.0 02/05/2025    EGFR >60.0 02/04/2025    EGFR >60.0 02/03/2025         "

## 2025-08-12 RX ORDER — REPAGLINIDE 2 MG/1
TABLET ORAL
Qty: 270 TABLET | Refills: 0 | Status: SHIPPED | OUTPATIENT
Start: 2025-08-12

## 2025-08-12 RX ORDER — METFORMIN HYDROCHLORIDE 1000 MG/1
1000 TABLET ORAL
Qty: 180 TABLET | Refills: 0 | Status: SHIPPED | OUTPATIENT
Start: 2025-08-12

## 2025-08-27 RX ORDER — DILTIAZEM HYDROCHLORIDE 120 MG/1
CAPSULE, COATED, EXTENDED RELEASE ORAL
Qty: 90 CAPSULE | Refills: 1 | Status: SHIPPED | OUTPATIENT
Start: 2025-08-27

## 2025-08-28 RX ORDER — APIXABAN 5 MG/1
5 TABLET, FILM COATED ORAL 2 TIMES DAILY
Qty: 180 TABLET | Refills: 1 | Status: SHIPPED | OUTPATIENT
Start: 2025-08-28

## (undated) DEVICE — BLADE SCALPEL #15

## (undated) DEVICE — DRAPE EXTREMITY 89X128 ST 12/CS

## (undated) DEVICE — BIOGUARD AIR/WATER

## (undated) DEVICE — SHEET DRAPE 40X58 STERILE

## (undated) DEVICE — SEALER VESSEL DAVINCI XI DISP

## (undated) DEVICE — BAG DRAIN SILVER 2000ML LATEX FREE

## (undated) DEVICE — ENDOWRIST SUCTION/IRRIGATOR 8MM

## (undated) DEVICE — APPLICATOR CHLORAPREP 26ML ORANGE TINT

## (undated) DEVICE — STAPLER SUREFORM 60

## (undated) DEVICE — XI, ENDO WRIST 12-8MM REDUCER

## (undated) DEVICE — BANDAGE ELASTIC 6IN X 5YDS MEDC

## (undated) DEVICE — SPONGE LAP 18X18 SAFE-T RFID ENHANCED XRAY

## (undated) DEVICE — LEGGINGS CLEAR PAIR

## (undated) DEVICE — BIOGUARD AIR/WATER CLEANING ADAPTER

## (undated) DEVICE — NEEDLE DISP HYPO 25GX1-1/2IN

## (undated) DEVICE — GLOVE SURG PROTEXIS PF 7.5

## (undated) DEVICE — DRAPE WARMER 66X44

## (undated) DEVICE — SYRINGE DISP LUER-LOK 30 CC

## (undated) DEVICE — GOWN SIRUS NONRNF RAGLAN XL ST 30/CS

## (undated) DEVICE — BLADE SCALPEL #10

## (undated) DEVICE — DRESSING SPONGE ALL GAUZE 4X4 STER 10PK

## (undated) DEVICE — GLOVE SZ 7.5 MICRO PROTEXIS LATEX

## (undated) DEVICE — STERISTRIP 1/2INX4IN

## (undated) DEVICE — BIOGUARD VALVES

## (undated) DEVICE — PAD POSITIONING XL W/ARM PROTECTORS

## (undated) DEVICE — TIP SUCTION YANKAUER

## (undated) DEVICE — PINPOINT ICG PAQ

## (undated) DEVICE — MANIFOLD FOUR PORT NEPTUNE

## (undated) DEVICE — PAD GROUND ELECTROSURGICAL W/CORD

## (undated) DEVICE — SET CYSTOSCOPY/IRRIGATION LATEXFREE

## (undated) DEVICE — SUTURE MONOCRYL 4-0 PS-1 Y682H

## (undated) DEVICE — ***USE 138850*** SUTURE PDS II 1 Z371T CTX

## (undated) DEVICE — TROCAR 1ST ENTRY 5 X 100MM ADV Z-THREAD

## (undated) DEVICE — PENEVAC1 NONSTICK SMOKE EVAC

## (undated) DEVICE — WAND SUCTION 90 DEG SUPER TURBO

## (undated) DEVICE — STRYKEFLOW 2 W/ DISP TIP

## (undated) DEVICE — TUBE SUCTION 1/4INX20FT STERILE

## (undated) DEVICE — Device

## (undated) DEVICE — SUTURE ETHILON  2-0   664H

## (undated) DEVICE — STAPLER 28 MM DST

## (undated) DEVICE — SYRINGE 10CC CONTROL LL

## (undated) DEVICE — DRAPE HALF STERILE

## (undated) DEVICE — EVACUATOR PLUME-AWAY LAP SMOKE PKG

## (undated) DEVICE — SOLN IRRIG STERILE WATER 3L

## (undated) DEVICE — ***USE 141021*** PACK CYSTOSCOPY III

## (undated) DEVICE — WIRE GUIDE SENSOR DUAL FLEX .038

## (undated) DEVICE — GOWN SURG STRL W/TWL LG

## (undated) DEVICE — PACK UNIVERSAL

## (undated) DEVICE — SUTURE VICRYL 3-0 J316H SH VIOLET

## (undated) DEVICE — GLOVE PROTEXIS PI ORTHO 6.0

## (undated) DEVICE — TUBING SMOKE EVAC PENCIL COATED

## (undated) DEVICE — MASTISOL LIQUID ADHESIVE

## (undated) DEVICE — DRESSING TEGADERM 4X4 3/4

## (undated) DEVICE — CATHETER Y CONNECTOR 14FR

## (undated) DEVICE — ACE BANDAGE W/VELCRO 4"

## (undated) DEVICE — SYSTEM VISUALIZATION CLEARIFY

## (undated) DEVICE — SNARE ELECTROSURGICAL

## (undated) DEVICE — CONNECTOR 6 IN 1 Y STERILE

## (undated) DEVICE — DRAPE ARM DAVINCI XI

## (undated) DEVICE — CAST PADDING 6IN

## (undated) DEVICE — COVER LIGHTHANDLE

## (undated) DEVICE — SUTURE VICRYL 3-0 J416H SH UNDYED

## (undated) DEVICE — TRAP QUICK CATCH POLY

## (undated) DEVICE — ***USE 56856*** CUTTER LINEAR 75MM    TLC75

## (undated) DEVICE — BLADE SHAVER AGGRESSIVE PLUS 4.0MM

## (undated) DEVICE — CUFF TOURNIQUET DISP 34 X 4

## (undated) DEVICE — BAG URINARY DRAINAGE

## (undated) DEVICE — SYRINGE DISP LUER-LOK 10 CC

## (undated) DEVICE — STRAP POSITIONING 5X72" ONE PIECE DISP

## (undated) DEVICE — NEEDLE BOX CONVENIENCE KIT

## (undated) DEVICE — BETADINE SOLUTION 8OZ BT

## (undated) DEVICE — SPONGE GAUZE 4X4 4 PLY-2S

## (undated) DEVICE — DRESSING TEGADERM 2X3

## (undated) DEVICE — SYRINGE DISP LUER-LOK  5 CC

## (undated) DEVICE — GLOVE SZ 8 LINER PROTEXIS PI BL

## (undated) DEVICE — SEALER/DIVIDER LIGASURE BLUNT TIP LAPAROSCOPIC 5 MM

## (undated) DEVICE — SYRINGE DISP LUER-LOK 20 CC

## (undated) DEVICE — OBTURATOR BLADELESS 8MM XI

## (undated) DEVICE — SET LF NONVENTED T-U-R

## (undated) DEVICE — SYRINGE 10CC NO NEEDLE ST

## (undated) DEVICE — ***USE 56941*** SUTURE VICRYL 0 J603H UR-6

## (undated) DEVICE — TUBE CONNECTING 14FRX30CM

## (undated) DEVICE — UNDERPAD DURASORB 30 X 30

## (undated) DEVICE — SOLN IRRIG .9%SOD 1000ML

## (undated) DEVICE — NEEDLE INJECTION 230CM 25 G/4 2.8MM

## (undated) DEVICE — ***USE 143206***SYRINGE BULB

## (undated) DEVICE — NEEDLES INSUFFLATION 150 MM

## (undated) DEVICE — LABEL MEDICATION GEN/GYN

## (undated) DEVICE — SOLN IRRIG .9%SOD 3L

## (undated) DEVICE — BANDAGE KERLIX STERILE 4.5INX 4.1YDS

## (undated) DEVICE — PACK MLHS KNEE ARTH STDZ

## (undated) DEVICE — SHEARS TIP COVER DAVINCI ONE USE

## (undated) DEVICE — GLOVE PROTEXIS PI ORTHO 8.0

## (undated) DEVICE — RELOAD SUREFORM 60 3.5 BLUE 6 ROW

## (undated) DEVICE — SEAL UNIVERSAL 5MM-8MM XI

## (undated) DEVICE — GAUZE XEROFORM 5X9 NON-STERILE PACKET

## (undated) DEVICE — SUTURE BIOSYN 3-0 UNDYED 1X30 P-14

## (undated) DEVICE — ARMBOARD BENDABLE LARGE

## (undated) DEVICE — SOLUTION ELECTROLUBE ANTI-STICK

## (undated) DEVICE — GAUZE 4X4 16 PLY RFID DOUBLE XRAY

## (undated) NOTE — ED AVS SNAPSHOT
Lubna Davis   MRN: [id number]    Department:  [facility] Emergency Department   Date of Visit:  7/15/2019           Disclosure     Insurance plans vary and the physician(s) referred by the ER may not be covered by your plan.  Please contact you CARE PHYSICIAN AT ONCE OR RETURN IMMEDIATELY TO THE EMERGENCY DEPARTMENT. If you have been prescribed any medication(s), please fill your prescription right away and begin taking the medication(s) as directed.   If you believe that any of the medications